# Patient Record
Sex: MALE | Race: WHITE | URBAN - METROPOLITAN AREA
[De-identification: names, ages, dates, MRNs, and addresses within clinical notes are randomized per-mention and may not be internally consistent; named-entity substitution may affect disease eponyms.]

---

## 2017-02-07 RX ORDER — EMPAGLIFLOZIN 10 MG/1
TABLET, FILM COATED ORAL
Qty: 30 TAB | Refills: 0 | Status: SHIPPED | OUTPATIENT
Start: 2017-02-07 | End: 2017-02-22 | Stop reason: SDUPTHER

## 2017-02-07 RX ORDER — INSULIN GLARGINE 100 [IU]/ML
INJECTION, SOLUTION SUBCUTANEOUS
Qty: 15 PEN | Refills: 0 | Status: SHIPPED | OUTPATIENT
Start: 2017-02-07 | End: 2017-02-22 | Stop reason: SDUPTHER

## 2017-02-22 ENCOUNTER — OFFICE VISIT (OUTPATIENT)
Dept: FAMILY MEDICINE CLINIC | Age: 52
End: 2017-02-22

## 2017-02-22 VITALS
HEART RATE: 83 BPM | OXYGEN SATURATION: 97 % | SYSTOLIC BLOOD PRESSURE: 136 MMHG | BODY MASS INDEX: 39.31 KG/M2 | WEIGHT: 259.4 LBS | TEMPERATURE: 98.4 F | DIASTOLIC BLOOD PRESSURE: 88 MMHG | RESPIRATION RATE: 16 BRPM | HEIGHT: 68 IN

## 2017-02-22 DIAGNOSIS — I10 ESSENTIAL HYPERTENSION, BENIGN: ICD-10-CM

## 2017-02-22 DIAGNOSIS — E78.2 MIXED HYPERLIPIDEMIA: ICD-10-CM

## 2017-02-22 LAB — HBA1C MFR BLD HPLC: 8 %

## 2017-02-22 RX ORDER — ATORVASTATIN CALCIUM 40 MG/1
TABLET, FILM COATED ORAL
Qty: 30 TAB | Refills: 5 | Status: SHIPPED | OUTPATIENT
Start: 2017-02-22 | End: 2017-07-17 | Stop reason: SDUPTHER

## 2017-02-22 RX ORDER — INSULIN GLARGINE 100 [IU]/ML
INJECTION, SOLUTION SUBCUTANEOUS
Qty: 15 PEN | Refills: 0 | Status: SHIPPED | OUTPATIENT
Start: 2017-02-22 | End: 2017-04-03 | Stop reason: SDUPTHER

## 2017-02-22 RX ORDER — LISINOPRIL 20 MG/1
TABLET ORAL
Qty: 30 TAB | Refills: 5 | Status: SHIPPED | OUTPATIENT
Start: 2017-02-22 | End: 2017-07-17 | Stop reason: SDUPTHER

## 2017-02-22 RX ORDER — PIOGLITAZONEHYDROCHLORIDE 30 MG/1
TABLET ORAL
Qty: 30 TAB | Refills: 5 | Status: SHIPPED | OUTPATIENT
Start: 2017-02-22 | End: 2017-07-17 | Stop reason: SDUPTHER

## 2017-02-22 RX ORDER — METFORMIN HYDROCHLORIDE 1000 MG/1
TABLET ORAL
Qty: 60 TAB | Refills: 5 | Status: SHIPPED | OUTPATIENT
Start: 2017-02-22 | End: 2017-07-17 | Stop reason: SDUPTHER

## 2017-02-22 NOTE — LETTER
3/14/2017 12:00 PM 
 
[id number] Dear Vi Nolan: 
 
Please find your most recent results below. Resulted Orders AMB POC HEMOGLOBIN A1C Result Value Ref Range Hemoglobin A1c (POC) 8 % METABOLIC PANEL, COMPREHENSIVE Result Value Ref Range Glucose 109 (H) 65 - 99 mg/dL BUN 15 6 - 24 mg/dL Creatinine 0.88 0.76 - 1.27 mg/dL GFR est non-AA 98 >59 mL/min/1.73 GFR est  >59 mL/min/1.73  
 BUN/Creatinine ratio 17 9 - 20 Sodium 140 134 - 144 mmol/L Potassium 4.2 3.5 - 5.2 mmol/L Chloride 99 96 - 106 mmol/L  
 CO2 23 18 - 29 mmol/L Calcium 8.8 8.7 - 10.2 mg/dL Protein, total 6.4 6.0 - 8.5 g/dL Albumin 4.4 3.5 - 5.5 g/dL GLOBULIN, TOTAL 2.0 1.5 - 4.5 g/dL A-G Ratio 2.2 1.1 - 2.5 Comment: **Effective March 13, 2017 the reference interval** 
  for A/G Ratio will be changing to: Age                Male          Female 0 -  7 days       1.1 - 2.3       1.1 - 2.3 
          8 - 30 days       1.2 - 2.8       1.2 - 2.8 
          1 -  6 months     1.3 - 3.6       1.3 - 3.6 
   7 months -  5 years      1.5 - 2.6       1.5 - 2.6 
             > 5 years      1.2 - 2.2       1.2 - 2.2 Bilirubin, total 0.7 0.0 - 1.2 mg/dL Alk. phosphatase 61 39 - 117 IU/L  
 AST (SGOT) 19 0 - 40 IU/L  
 ALT (SGPT) 16 0 - 44 IU/L Narrative Performed at:  [address]  [id number] : Jenelle Flroence MD, Phone:  [phone] LIPID PANEL Result Value Ref Range Cholesterol, total 127 100 - 199 mg/dL Triglyceride 77 0 - 149 mg/dL HDL Cholesterol 42 >39 mg/dL VLDL, calculated 15 5 - 40 mg/dL LDL, calculated 70 0 - 99 mg/dL Narrative Performed at:  [address]  [id number] : Jenelle Florence MD, Phone:  [phone] MICROALBUMIN, UR, RAND Result Value Ref Range Creatinine, urine 94.2 Not Estab. mg/dL Microalbumin, urine <3.0 Not Estab. ug/mL Microalb/Creat ratio (ug/mg creat.) <3.2 0.0 - 30.0 mg/g creat Narrative Performed at:  [address]  [id number] : Yaneli Gunn MD, Phone:  [phone] CVD REPORT Result Value Ref Range INTERPRETATION Note Comment:  
   Supplement report is available. Narrative Performed at:  [address] [place] [address] [address] [place] [address]  [id number] : Benoit Antony PhD, Phone:  [phone] Cholesterol is at goal  
Hemoglobin A1C needs to be less than 8% Needs to work on diet and exercise Needs to be seen in 3 months Rest of his labs are stable Please call me if you have any questions: [phone] Sincerely, Todd Gutierrez MD

## 2017-02-23 NOTE — PROGRESS NOTES
Chief Complaint   Patient presents with    Diabetes       1. Have you been to the ER, urgent care clinic since your last visit? Hospitalized since your last visit? No    2. Have you seen or consulted any other health care providers outside of the [address] [place] [address] since your last visit? Include any pap smears or colon screening. No    Body mass index is 39.44 kg/(m^2).

## 2017-02-23 NOTE — PROGRESS NOTES
HISTORY OF PRESENT ILLNESS  Veronique New is a 46 y.o. male. Blood pressure 136/88, pulse 83, temperature 98.4 °F (36.9 °C), temperature source Oral, resp. rate 16, height 5' 8\" (1.727 m), weight 259 lb 6.4 oz (117.7 kg), SpO2 97 %. Body mass index is 39.44 kg/(m^2). Chief Complaint   Patient presents with    Diabetes      HPI   Veronique New 46 y.o. male  presents to the office today for a follow up on chronic conditions. DM2: A1c per POC today 8.0%, increased from A1c of 7.3% on 08/30/16. Pt continues with Lantus Solostar 60 units daily, Jardiance 10 mg daily, Actos 30 mg daily, and Metformin 1,000 mg BID. Pt attributes increased A1c to poor diet choices and increased stress at his new job. He notes his insurance no longer covers Invokana so he has been off the medication. He notes completing retinal eye exams yearly with ophthalmology (Dr. Lizett Covington). Diabetes is uncontrolled with A1c 8.0% per POC today. Since pt can no longer take Invokana due to financial reasons, we will increase dosage of Jardiance to 25 mg daily. Counseled pt on diet and exercise. Will reassess A1c in three months. Hypertension: Bp at office today 137/90. Pt continues with Lisinopril 20 mg daily. Bp control stable, continue current regimen. Hyperlipidemia: Lipid panel on 04/13/16 notable for total cholesterol 135, LDL 70, HDL 48, and triglycerides 86. Pt continues with Lipitor 40 mg daily. Cholesterol is presumed stable, will assess levels when pt returns for fasting labs. Health maintenance: Pt notes completing colonoscopy in 2016 with GI (Dr. Argentina Olson). We will contact Dr. João Martino office to receive pt's report. Pt will receive flu vaccine at office today. Current Outpatient Prescriptions   Medication Sig Dispense Refill    empagliflozin (JARDIANCE) 25 mg tablet TAKE 1 TABLET BY MOUTH DAILY.  30 Tab 5    insulin glargine (LANTUS SOLOSTAR) 100 unit/mL (3 mL) pen INJECT 60 UNITS DAILY 15 Pen 0    pioglitazone (ACTOS) 30 mg tablet TAKE ONE TABLET BY MOUTH DAILY 30 Tab 5    atorvastatin (LIPITOR) 40 mg tablet TAKE 1 TABLET BY MOUTH EVERY DAY 30 Tab 5    lisinopril (PRINIVIL, ZESTRIL) 20 mg tablet TAKE 1 TABLET BY MOUTH EVERY DAY 30 Tab 5    metFORMIN (GLUCOPHAGE) 1,000 mg tablet TAKE 1 TABLET BY MOUTH TWICE A DAY WITH MEALS 60 Tab 5    pantoprazole (PROTONIX) 40 mg tablet TAKE 1 TABLET BY MOUTH EVERY DAY 30 Tab 5    pen needle, diabetic (NOVOTWIST) 32 gauge x 1/5\" ndle 100 Each by Does Not Apply route daily. 200 Each 11    glucose blood VI test strips (BLOOD GLUCOSE TEST) strip Accu-Chek Comfort Curv,    Dx code 250.02    Test blood sugar twice daily or as directed by Dr. Andriy Laurent. 200 Package 11    Lancets misc Accu-Chek Comfort Curv monitor   Test blood sugar twice a day or as directed by Dr. Andriy Laurent. 200 Each 11    Blood-Glucose Meter monitoring kit Check glucose daily in the Am  Dx 250.02 1 kit 0     Allergies   Allergen Reactions    Ppa/Pe/Phenyltolox/Cpm Td Unknown (comments)     Allergies:PPA     Past Medical History:   Diagnosis Date    Allergic rhinitis due to other allergen 3/5/2010    Colon polyp     Family history of colon cancer in mother     Hypertension     Mixed hyperlipidemia 3/5/2010    Obesity, unspecified 3/5/2010    PUD (peptic ulcer disease)     15 years ago   [address] [place] [address] Pure hyperglyceridemia 3/5/2010    Type II or unspecified type diabetes mellitus without mention of complication, not stated as uncontrolled 3/5/2010     Past Surgical History:   Procedure Laterality Date    HX COLONOSCOPY  03/2016    Dr. Marie Montana     Family History   Problem Relation Age of Onset    Cancer Mother     Heart Disease Father      Social History   Substance Use Topics    Smoking status: Never Smoker    Smokeless tobacco: Never Used    Alcohol use 2.0 oz/week     4 Standard drinks or equivalent per week        Review of Systems   Constitutional: Negative. Negative for malaise/fatigue.    Eyes: Negative for blurred vision. Respiratory: Negative for shortness of breath. Cardiovascular: Negative for chest pain and leg swelling. Musculoskeletal: Negative. Neurological: Negative. Negative for dizziness and headaches. All other systems reviewed and are negative. Physical Exam   Constitutional: He is oriented to person, place, and time. He appears well-developed and well-nourished. HENT:   Head: Normocephalic and atraumatic. Neck: Carotid bruit is not present. Cardiovascular: Normal rate, regular rhythm, normal heart sounds and intact distal pulses. Exam reveals no gallop and no friction rub. No murmur heard. Pulmonary/Chest: Effort normal and breath sounds normal. No respiratory distress. He has no wheezes. He has no rales. He exhibits no tenderness. Neurological: He is alert and oriented to person, place, and time. Psychiatric: He has a normal mood and affect. His behavior is normal. Judgment and thought content normal.   Nursing note and vitals reviewed. Diabetic foot exam:     Left: Reflexes 2+     Vibratory sensation normal    Proprioception normal   Sharp/dull discrimination normal    Filament test normal sensation with micro filament   Pulse DP: 2+ (normal)   Pulse PT: 2+ (normal)   Deformities: None  Right: Reflexes 2+   Vibratory sensation normal   Proprioception normal   Sharp/dull discrimination normal   Filament test normal sensation with micro filament   Pulse DP: 2+ (normal)   Pulse PT: 2+ (normal)   Deformities: None    JOSELO and Tyson Pace was seen today for diabetes. Diagnoses and all orders for this visit:    Uncontrolled type 2 diabetes mellitus without complication, with long-term current use of insulin ([facility])  -     AMB POC HEMOGLOBIN A1C  -     empagliflozin (JARDIANCE) 25 mg tablet; TAKE 1 TABLET BY MOUTH DAILY.   -     insulin glargine (LANTUS SOLOSTAR) 100 unit/mL (3 mL) pen; INJECT 60 UNITS DAILY  -     pioglitazone (ACTOS) 30 mg tablet; TAKE ONE TABLET BY MOUTH DAILY  -     metFORMIN (GLUCOPHAGE) 1,000 mg tablet; TAKE 1 TABLET BY MOUTH TWICE A DAY WITH MEALS  -      DIABETES FOOT EXAM  -     METABOLIC PANEL, COMPREHENSIVE  -     MICROALBUMIN, UR, RAND W/ MICROALBUMIN/CREA RATIO  - Diabetes is uncontrolled with A1c 8.0% per POC today. Pt has been off his Invokana 100 mg due to financial reasons. Advised pt to increase dosage of Jardiance to 25 mg daily and counseled him on importance of diabetic diet. We will reassess A1c in three months. Mixed hyperlipidemia  -     atorvastatin (LIPITOR) 40 mg tablet; TAKE 1 TABLET BY MOUTH EVERY DAY  -     lisinopril (PRINIVIL, ZESTRIL) 20 mg tablet; TAKE 1 TABLET BY MOUTH EVERY DAY  -     METABOLIC PANEL, COMPREHENSIVE  -     LIPID PANEL  - Presumed stable, will assess levels when pt returns for fasting labs. Essential hypertension, benign  -     METABOLIC PANEL, COMPREHENSIVE  - Bp control stable, continue current regimen. Obesity, Class II, BMI 35-39.9, with comorbidity ([address]  I have reviewed/discussed the above normal BMI with the patient. I have recommended the following interventions: dietary management education, guidance, and counseling, dietary needs education, encourage exercise, feeding regime, lifestyle education regarding diet and monitor weight . The plan is as follows: I have counseled this patient on diet and exercise regimens. .        Follow-up Disposition:  Return in about 3 months (around 5/22/2017) for diabetes follow up. Medication risks/benefits/costs/interactions/alternatives discussed with patient. Advised patient to call back or return to office if symptoms worsen/change/persist.  If patient cannot reach us or should anything more severe/urgent arise he/she should proceed directly to the nearest emergency department. Discussed expected course/resolution/complications of diagnosis in detail with patient.   Patient given a written after visit summary which includes her diagnoses, current medications and vitals. Patient expressed understanding with the diagnosis and plan. Written by hoa Watson, as dictated by Mindi Sever, M.D.    I have reviewed and agree with the above note and have made corrections where appropriate, Dr. Prasad Guevara MD

## 2017-02-26 LAB
ALBUMIN SERPL-MCNC: 4.4 G/DL (ref 3.5–5.5)
ALBUMIN/CREAT UR: <3.2 MG/G CREAT (ref 0–30)
ALBUMIN/GLOB SERPL: 2.2 {RATIO} (ref 1.1–2.5)
ALP SERPL-CCNC: 61 IU/L (ref 39–117)
ALT SERPL-CCNC: 16 IU/L (ref 0–44)
AST SERPL-CCNC: 19 IU/L (ref 0–40)
BILIRUB SERPL-MCNC: 0.7 MG/DL (ref 0–1.2)
BUN SERPL-MCNC: 15 MG/DL (ref 6–24)
BUN/CREAT SERPL: 17 (ref 9–20)
CALCIUM SERPL-MCNC: 8.8 MG/DL (ref 8.7–10.2)
CHLORIDE SERPL-SCNC: 99 MMOL/L (ref 96–106)
CHOLEST SERPL-MCNC: 127 MG/DL (ref 100–199)
CO2 SERPL-SCNC: 23 MMOL/L (ref 18–29)
CREAT SERPL-MCNC: 0.88 MG/DL (ref 0.76–1.27)
CREAT UR-MCNC: 94.2 MG/DL
GLOBULIN SER CALC-MCNC: 2 G/DL (ref 1.5–4.5)
GLUCOSE SERPL-MCNC: 109 MG/DL (ref 65–99)
HDLC SERPL-MCNC: 42 MG/DL
INTERPRETATION, 910389: NORMAL
LDLC SERPL CALC-MCNC: 70 MG/DL (ref 0–99)
MICROALBUMIN UR-MCNC: <3 UG/ML
POTASSIUM SERPL-SCNC: 4.2 MMOL/L (ref 3.5–5.2)
PROT SERPL-MCNC: 6.4 G/DL (ref 6–8.5)
SODIUM SERPL-SCNC: 140 MMOL/L (ref 134–144)
TRIGL SERPL-MCNC: 77 MG/DL (ref 0–149)
VLDLC SERPL CALC-MCNC: 15 MG/DL (ref 5–40)

## 2017-03-13 NOTE — PROGRESS NOTES
Cholesterol is at goal  A1C needs to be less than 8%  Pt needs to work on diet and exercise  Pt needs to be seen in 3 months    Rest of his labs are stable

## 2017-03-14 NOTE — PROGRESS NOTES
Cholesterol is at goal   Hemoglobin A1C needs to be less than 8%   Needs to work on diet and exercise   Needs to be seen in 3 months   Mail labs to patient

## 2017-04-01 DIAGNOSIS — K21.9 GASTROESOPHAGEAL REFLUX DISEASE WITHOUT ESOPHAGITIS: ICD-10-CM

## 2017-04-03 RX ORDER — PANTOPRAZOLE SODIUM 40 MG/1
TABLET, DELAYED RELEASE ORAL
Qty: 30 TAB | Refills: 5 | Status: SHIPPED | OUTPATIENT
Start: 2017-04-03 | End: 2017-07-17 | Stop reason: SDUPTHER

## 2017-04-06 RX ORDER — INSULIN GLARGINE 100 [IU]/ML
INJECTION, SOLUTION SUBCUTANEOUS
Qty: 15 PEN | Refills: 5 | Status: SHIPPED | OUTPATIENT
Start: 2017-04-06 | End: 2017-07-17 | Stop reason: SDUPTHER

## 2017-07-17 ENCOUNTER — OFFICE VISIT (OUTPATIENT)
Dept: FAMILY MEDICINE CLINIC | Age: 52
End: 2017-07-17

## 2017-07-17 VITALS
HEIGHT: 68 IN | OXYGEN SATURATION: 98 % | DIASTOLIC BLOOD PRESSURE: 83 MMHG | BODY MASS INDEX: 39.4 KG/M2 | TEMPERATURE: 98 F | HEART RATE: 78 BPM | SYSTOLIC BLOOD PRESSURE: 120 MMHG | WEIGHT: 260 LBS | RESPIRATION RATE: 18 BRPM

## 2017-07-17 DIAGNOSIS — E78.2 MIXED HYPERLIPIDEMIA: ICD-10-CM

## 2017-07-17 DIAGNOSIS — Z79.4 UNCONTROLLED TYPE 2 DIABETES MELLITUS WITH HYPERGLYCEMIA, WITH LONG-TERM CURRENT USE OF INSULIN (HCC): Primary | ICD-10-CM

## 2017-07-17 DIAGNOSIS — K21.9 GASTROESOPHAGEAL REFLUX DISEASE WITHOUT ESOPHAGITIS: ICD-10-CM

## 2017-07-17 DIAGNOSIS — E11.65 UNCONTROLLED TYPE 2 DIABETES MELLITUS WITH HYPERGLYCEMIA, WITH LONG-TERM CURRENT USE OF INSULIN (HCC): Primary | ICD-10-CM

## 2017-07-17 DIAGNOSIS — I10 ESSENTIAL HYPERTENSION, BENIGN: ICD-10-CM

## 2017-07-17 LAB — HBA1C MFR BLD HPLC: 8 %

## 2017-07-17 RX ORDER — PANTOPRAZOLE SODIUM 40 MG/1
TABLET, DELAYED RELEASE ORAL
Qty: 30 TAB | Refills: 5 | Status: SHIPPED | OUTPATIENT
Start: 2017-07-17 | End: 2017-12-04 | Stop reason: SDUPTHER

## 2017-07-17 RX ORDER — LISINOPRIL 20 MG/1
TABLET ORAL
Qty: 30 TAB | Refills: 5 | Status: SHIPPED | OUTPATIENT
Start: 2017-07-17 | End: 2017-12-04 | Stop reason: SDUPTHER

## 2017-07-17 RX ORDER — PIOGLITAZONEHYDROCHLORIDE 30 MG/1
TABLET ORAL
Qty: 30 TAB | Refills: 5 | Status: SHIPPED | OUTPATIENT
Start: 2017-07-17 | End: 2017-12-04 | Stop reason: SDUPTHER

## 2017-07-17 RX ORDER — INSULIN GLARGINE 100 [IU]/ML
INJECTION, SOLUTION SUBCUTANEOUS
Qty: 15 PEN | Refills: 5 | Status: SHIPPED | OUTPATIENT
Start: 2017-07-17 | End: 2017-12-04 | Stop reason: SDUPTHER

## 2017-07-17 RX ORDER — ATORVASTATIN CALCIUM 40 MG/1
TABLET, FILM COATED ORAL
Qty: 30 TAB | Refills: 5 | Status: SHIPPED | OUTPATIENT
Start: 2017-07-17 | End: 2017-12-04 | Stop reason: SDUPTHER

## 2017-07-17 RX ORDER — METFORMIN HYDROCHLORIDE 1000 MG/1
TABLET ORAL
Qty: 60 TAB | Refills: 5 | Status: SHIPPED | OUTPATIENT
Start: 2017-07-17 | End: 2017-12-04 | Stop reason: SDUPTHER

## 2017-07-17 NOTE — PROGRESS NOTES
HISTORY OF PRESENT ILLNESS  Wilmer Barros is a 46 y.o. male. Blood pressure 120/83, pulse 78, temperature 98 °F (36.7 °C), temperature source Oral, resp. rate 18, height 5' 8\" (1.727 m), weight 260 lb (117.9 kg), SpO2 98 %. Body mass index is 39.53 kg/(m^2). Chief Complaint   Patient presents with    Diabetes        HPI  Wilmer Barros 46 y.o. male  presents to the office today for follow up on diabetes. DM2: A1c per POC today 8.0%, same as A1c of 8.0% on 02/22/27. Pt continues with Lantus Solostar 60 units daily, Jardiance 25 mg daily, Metformin 1,000 mg BID. Pt states that his diet has been suboptimal, states he has been drinking margaritas and eating gummy bears. Pt states that he feels that he has been relying on drugs alone to lower A1c. Diabetes is suboptimal with A1c of 8.0% at [facility] today. Advised pt to improve A1c through diet and exercise. We will follow up in 4 months. Health Maintenance: Pt states he had colonoscopy done by Dr. Moody Steele (GI) and eye exam done by Dr. Jamie Martin (Opthamology). GERD: Pt stats that he has problem with GERD. He states that the GERD is worse with eating food such as pizza. Pt is currently on Protonix 40 daily and OTC Zantac 100 mg daily. I have recommended that pt start OTC [id number] mg daily and Protonix 40 mg daily. If symptoms for GERD do not get better, we may refer pt to GI for further evaluation. Current Outpatient Prescriptions   Medication Sig Dispense Refill    insulin glargine (LANTUS SOLOSTAR) 100 unit/mL (3 mL) inpn INECT 60 UNIT UNDER THE SKIN DAILY 15 Pen 5    pantoprazole (PROTONIX) 40 mg tablet TAKE ONE TABLET BY MOUTH DAILY 30 Tab 5    empagliflozin (JARDIANCE) 25 mg tablet TAKE 1 TABLET BY MOUTH DAILY.  30 Tab 5    pioglitazone (ACTOS) 30 mg tablet TAKE ONE TABLET BY MOUTH DAILY 30 Tab 5    lisinopril (PRINIVIL, ZESTRIL) 20 mg tablet TAKE 1 TABLET BY MOUTH EVERY DAY 30 Tab 5    pen needle, diabetic (NOVOTWIST) 32 gauge x 1/5\" ndle 100 Each by Does Not Apply route daily. 200 Pen Needle 11    metFORMIN (GLUCOPHAGE) 1,000 mg tablet TAKE 1 TABLET BY MOUTH TWICE A DAY WITH MEALS 60 Tab 5    atorvastatin (LIPITOR) 40 mg tablet TAKE 1 TABLET BY MOUTH EVERY DAY 30 Tab 5    glucose blood VI test strips (BLOOD GLUCOSE TEST) strip Accu-Chek Comfort Curv,    Dx code 250.02    Test blood sugar twice daily or as directed by Dr. Delmi Montes. 200 Strip 11    Lancets misc Accu-Chek Comfort Curv monitor   Test blood sugar twice a day or as directed by Dr. Delmi Montes. 200 Each 11    Blood-Glucose Meter monitoring kit Check glucose daily in the Am  Dx 250.02 1 kit 0     Allergies   Allergen Reactions    Ppa/Pe/Phenyltolox/Cpm Td Unknown (comments)     Allergies:PPA     Past Medical History:   Diagnosis Date    Allergic rhinitis due to other allergen 3/5/2010    Colon polyp     Family history of colon cancer in mother     Hypertension     Mixed hyperlipidemia 3/5/2010    Obesity, unspecified 3/5/2010    PUD (peptic ulcer disease)     15 years ago   [address] [place] [address] Pure hyperglyceridemia 3/5/2010    Type II or unspecified type diabetes mellitus without mention of complication, not stated as uncontrolled 3/5/2010     Past Surgical History:   Procedure Laterality Date    HX COLONOSCOPY  03/2016    Dr. Heather Montoya     Family History   Problem Relation Age of Onset    Cancer Mother     Heart Disease Father      Social History   Substance Use Topics    Smoking status: Never Smoker    Smokeless tobacco: Never Used    Alcohol use 2.0 oz/week     4 Standard drinks or equivalent per week        Review of Systems   Constitutional: Negative. Negative for malaise/fatigue. Eyes: Negative for blurred vision. Respiratory: Negative for shortness of breath. Cardiovascular: Negative for chest pain and leg swelling. Gastrointestinal: Positive for heartburn. Musculoskeletal: Negative. Neurological: Negative. Negative for dizziness and headaches.    All other systems reviewed and are negative. Physical Exam   Constitutional: He is oriented to person, place, and time. He appears well-developed and well-nourished. HENT:   Head: Normocephalic and atraumatic. Neck: Carotid bruit is not present. Cardiovascular: Normal rate, regular rhythm, normal heart sounds and intact distal pulses. Exam reveals no gallop and no friction rub. No murmur heard. Pulmonary/Chest: Effort normal and breath sounds normal. No respiratory distress. He has no wheezes. He has no rales. He exhibits no tenderness. Neurological: He is alert and oriented to person, place, and time. Psychiatric: He has a normal mood and affect. His behavior is normal. Judgment and thought content normal.   Nursing note and vitals reviewed. Diabetic foot exam:     Left: Reflexes 2+     Vibratory sensation normal    Proprioception normal   Sharp/dull discrimination normal    Filament test normal sensation with micro filament   Pulse DP: 2+ (normal)   Pulse PT: 2+ (normal)   Deformities: None  Right: Reflexes 2+   Vibratory sensation normal   Proprioception normal   Sharp/dull discrimination normal   Filament test normal sensation with micro filament   Pulse DP: 2+ (normal)   Pulse PT: 2+ (normal)   Deformities: None      ASSESSMENT and Daryl Moreno was seen today for diabetes. Diagnoses and all orders for this visit:    Uncontrolled type 2 diabetes mellitus with hyperglycemia, with long-term current use of insulin ([facility])  -     [facility] POC HEMOGLOBIN A1C  -      DIABETES FOOT EXAM  -     insulin glargine (LANTUS SOLOSTAR) 100 unit/mL (3 mL) inpn; INECT 60 UNIT UNDER THE SKIN DAILY  -     empagliflozin (JARDIANCE) 25 mg tablet; TAKE 1 TABLET BY MOUTH DAILY. -     pioglitazone (ACTOS) 30 mg tablet; TAKE ONE TABLET BY MOUTH DAILY  -     pen needle, diabetic (NOVOTWIST) 32 gauge x 1/5\" ndle; 100 Each by Does Not Apply route daily.   -     metFORMIN (GLUCOPHAGE) 1,000 mg tablet; TAKE 1 TABLET BY MOUTH TWICE A DAY WITH MEALS  -     glucose blood VI test strips (BLOOD GLUCOSE TEST) strip; Accu-Chek Comfort Curv,    Dx code 250.02    Test blood sugar twice daily or as directed by Dr. Jacob Burton.  - Diabetes is sub-optimal with A1c of 8.4% at [address] today. I have recommend that pt work on diet and exercise regimen to lower levels. We will follow up in 4 months. Essential hypertension, benign        - Bp control stable, continue current regimen. Mixed hyperlipidemia  -     lisinopril (PRINIVIL, ZESTRIL) 20 mg tablet; TAKE 1 TABLET BY MOUTH EVERY DAY  -     atorvastatin (LIPITOR) 40 mg tablet; TAKE 1 TABLET BY MOUTH EVERY DAY  - Lipids are at goal. Continue current regimen. Gastroesophageal reflux disease without esophagitis  -     pantoprazole (PROTONIX) 40 mg tablet; TAKE ONE TABLET BY MOUTH DAILY  - Pt hasa GERD. I have adivsed pt to take OTC Zantac 150 mg daily and Protonix 40 mg daily. If symptoms get worse, we may refer pt to GI. Follow-up Disposition:  Return in about 4 months (around 11/1/2017) for diabetes follow up. Medication risks/benefits/costs/interactions/alternatives discussed with patient. Advised patient to call back or return to office if symptoms worsen/change/persist.  If patient cannot reach us or should anything more severe/urgent arise he/she should proceed directly to the nearest emergency department. Discussed expected course/resolution/complications of diagnosis in detail with patient. Patient given a written after visit summary which includes her diagnoses, current medications and vitals. Patient expressed understanding with the diagnosis and plan. Written by hoa Keene, as dictated by Kip Wisdom M.D.   I have reviewed and agree with the above note and have made corrections where appropriate, Dr. Tod Epley, MD

## 2017-07-17 NOTE — MR AVS SNAPSHOT
Visit Information Date & Time Provider Department Dept. Phone Encounter #  
 7/17/2017  4:15 PM Kylah Castro  W [facility] [phone] [id number] Follow-up Instructions Return in about 4 months (around 11/1/2017) for diabetes follow up. Your Appointments 11/1/2017  2:30 PM  
ROUTINE CARE with Kylah Castro MD  
[facility]) Appt Note: 3 months follow up visit DM  
 [address] [phone]  
[phone]  
  
   
 [address] [phone] Upcoming Health Maintenance Date Due  
 EYE EXAM RETINAL OR DILATED Q1 9/4/1973 COLONOSCOPY 12/7/2015 INFLUENZA AGE 9 TO ADULT 8/1/2017 HEMOGLOBIN A1C Q6M 8/22/2017 FOOT EXAM Q1 2/22/2018 MICROALBUMIN Q1 2/25/2018 LIPID PANEL Q1 2/25/2018 DTaP/Tdap/Td series (2 - Td) 12/30/2024 Allergies as of 7/17/2017  Review Complete On: 7/17/2017 By: Kylah Castro MD  
  
 Severity Noted Reaction Type Reactions Ppa/pe/phenyltolox/cpm Td  03/05/2010    Unknown (comments) Allergies:PPA Current Immunizations  Reviewed on 12/30/2014 Name Date Influenza Vaccine (Quad) PF 12/30/2014  4:36 PM  
 Influenza Vaccine Split 2/10/2011 Pneumococcal Polysaccharide (PPSV-23) 12/5/2007 Tdap 12/30/2014  4:34 PM  
  
 Not reviewed this visit You Were Diagnosed With   
  
 Codes Comments Uncontrolled type 2 diabetes mellitus with hyperglycemia, with long-term current use of insulin (HCC)    -  Primary ICD-10-CM: E11.65, Z79.4 ICD-9-CM: 250.02, V58.67 Essential hypertension, benign     ICD-10-CM: I10 
ICD-9-CM: 401.1 Mixed hyperlipidemia     ICD-10-CM: E78.2 ICD-9-CM: 272.2 Gastroesophageal reflux disease without esophagitis     ICD-10-CM: K21.9 ICD-9-CM: 530.81 Vitals BP Pulse Temp Resp Height(growth percentile) Weight(growth percentile) 120/83 (BP 1 Location: Right arm, BP Patient Position: Sitting) 78 98 °F (36.7 °C) (Oral) 18 5' 8\" (1.727 m) 260 lb (117.9 kg) SpO2 BMI Smoking Status 98% 39.53 kg/m2 Never Smoker Vitals History BMI and BSA Data Body Mass Index Body Surface Area  
 39.53 kg/m 2 2.38 m 2 Preferred Pharmacy Pharmacy Name Phone [facility] [address] [address], [address] [phone] Your Updated Medication List  
  
   
This list is accurate as of: 7/17/17  5:05 PM.  Always use your most recent med list.  
  
  
  
  
 atorvastatin 40 mg tablet Commonly known as:  LIPITOR  
TAKE 1 TABLET BY MOUTH EVERY DAY Blood-Glucose Meter monitoring kit Check glucose daily in the Am  Dx 250.02  
  
 empagliflozin 25 mg tablet Commonly known as:  JARDIANCE  
TAKE 1 TABLET BY MOUTH DAILY. glucose blood VI test strips strip Commonly known as:  blood glucose test  
Accu-Chek Comfort Curv,    Dx code 250.02    Test blood sugar twice daily or as directed by Dr. Weston Ward. Lancets Misc Accu-Chek Comfort Curv monitor   Test blood sugar twice a day or as directed by Dr. Weston Ward. LANTUS SOLOSTAR 100 unit/mL (3 mL) Inpn Generic drug:  insulin glargine INECT 60 UNIT UNDER THE SKIN DAILY  
  
 lisinopril 20 mg tablet Commonly known as:  PRINIVIL, ZESTRIL  
TAKE 1 TABLET BY MOUTH EVERY DAY  
  
 metFORMIN 1,000 mg tablet Commonly known as:  GLUCOPHAGE  
TAKE 1 TABLET BY MOUTH TWICE A DAY WITH MEALS  
  
 pantoprazole 40 mg tablet Commonly known as:  PROTONIX  
TAKE ONE TABLET BY MOUTH DAILY pen needle, diabetic 32 gauge x 1/5\" Ndle Commonly known as:  NOVOTWIST  
100 Each by Does Not Apply route daily. pioglitazone 30 mg tablet Commonly known as:  ACTOS  
TAKE ONE TABLET BY MOUTH DAILY We Performed the Following AMB POC HEMOGLOBIN A1C [81352 CPT(R)] Follow-up Instructions Return in about 4 months (around 11/1/2017) for diabetes follow up. Introducing [facility]! [employer] introduces [employer] patient portal. Now you can access parts of your medical record, email your doctor's office, and request medication refills online. 1. In your internet browser, go to [email or web address] 2. Click on the First Time User? Click Here link in the Sign In box. You will see the New Member Sign Up page. 3. Enter your [employer] Access Code exactly as it appears below. You will not need to use this code after youve completed the sign-up process. If you do not sign up before the expiration date, you must request a new code. · [employer] Access Code: [email or web address] Expires: 10/15/2017  5:00 PM 
 
4. Enter the last four digits of your Social Security Number (xxxx) and Date of Birth (mm/dd/yyyy) as indicated and click Submit. You will be taken to the next sign-up page. 5. Create a [employer] ID. This will be your [employer] login ID and cannot be changed, so think of one that is secure and easy to remember. 6. Create a [employer] password. You can change your password at any time. 7. Enter your Password Reset Question and Answer. This can be used at a later time if you forget your password. 8. Enter your e-mail address. You will receive e-mail notification when new information is available in [address] 9. Click Sign Up. You can now view and download portions of your medical record. 10. Click the Download Summary menu link to download a portable copy of your medical information. If you have questions, please visit the Frequently Asked Questions section of the [employer] website. Remember, [employer] is NOT to be used for urgent needs. For medical emergencies, dial 911. Now available from your iPhone and Android! Please provide this summary of care documentation to your next provider. Your primary care clinician is listed as Rachana Greene. If you have any questions after today's visit, please call [phone].

## 2017-07-17 NOTE — PROGRESS NOTES
Chief Complaint   Patient presents with    Diabetes       Reviewed Record in preparation for visit and have obtained necessary documentation. Identified pt with two pt identifiers (Name @ )    Health Maintenance Due   Topic    EYE EXAM RETINAL OR DILATED Q1     COLONOSCOPY          1. Have you been to the ER, urgent care clinic since your last visit? Hospitalized since your last visit? No    2. Have you seen or consulted any other health care providers outside of the [facility] since your last visit? Include any pap smears or colon screening.  No

## 2017-12-04 ENCOUNTER — OFFICE VISIT (OUTPATIENT)
Dept: FAMILY MEDICINE CLINIC | Age: 52
End: 2017-12-04

## 2017-12-04 VITALS
HEART RATE: 69 BPM | DIASTOLIC BLOOD PRESSURE: 72 MMHG | OXYGEN SATURATION: 97 % | SYSTOLIC BLOOD PRESSURE: 110 MMHG | HEIGHT: 68 IN | RESPIRATION RATE: 18 BRPM | TEMPERATURE: 97.4 F | BODY MASS INDEX: 38.1 KG/M2 | WEIGHT: 251.4 LBS

## 2017-12-04 DIAGNOSIS — E78.2 MIXED HYPERLIPIDEMIA: ICD-10-CM

## 2017-12-04 DIAGNOSIS — I10 ESSENTIAL HYPERTENSION, BENIGN: ICD-10-CM

## 2017-12-04 DIAGNOSIS — Z79.4 UNCONTROLLED TYPE 2 DIABETES MELLITUS WITH HYPERGLYCEMIA, WITH LONG-TERM CURRENT USE OF INSULIN (HCC): Primary | ICD-10-CM

## 2017-12-04 DIAGNOSIS — K21.9 GASTROESOPHAGEAL REFLUX DISEASE WITHOUT ESOPHAGITIS: ICD-10-CM

## 2017-12-04 DIAGNOSIS — E11.65 UNCONTROLLED TYPE 2 DIABETES MELLITUS WITH HYPERGLYCEMIA, WITH LONG-TERM CURRENT USE OF INSULIN (HCC): Primary | ICD-10-CM

## 2017-12-04 LAB — HBA1C MFR BLD HPLC: 7.9 %

## 2017-12-04 RX ORDER — PANTOPRAZOLE SODIUM 40 MG/1
TABLET, DELAYED RELEASE ORAL
Qty: 30 TAB | Refills: 5 | Status: SHIPPED | OUTPATIENT
Start: 2017-12-04 | End: 2018-12-07 | Stop reason: SDUPTHER

## 2017-12-04 RX ORDER — LISINOPRIL 20 MG/1
TABLET ORAL
Qty: 30 TAB | Refills: 5 | Status: SHIPPED | OUTPATIENT
Start: 2017-12-04 | End: 2018-10-14 | Stop reason: SDUPTHER

## 2017-12-04 RX ORDER — ATORVASTATIN CALCIUM 40 MG/1
TABLET, FILM COATED ORAL
Qty: 30 TAB | Refills: 5 | Status: SHIPPED | OUTPATIENT
Start: 2017-12-04 | End: 2018-10-14 | Stop reason: SDUPTHER

## 2017-12-04 RX ORDER — METFORMIN HYDROCHLORIDE 1000 MG/1
TABLET ORAL
Qty: 60 TAB | Refills: 5 | Status: SHIPPED | OUTPATIENT
Start: 2017-12-04 | End: 2018-10-07 | Stop reason: SDUPTHER

## 2017-12-04 RX ORDER — PIOGLITAZONEHYDROCHLORIDE 30 MG/1
TABLET ORAL
Qty: 30 TAB | Refills: 5 | Status: SHIPPED | OUTPATIENT
Start: 2017-12-04 | End: 2018-09-08 | Stop reason: SDUPTHER

## 2017-12-04 RX ORDER — INSULIN GLARGINE 100 [IU]/ML
INJECTION, SOLUTION SUBCUTANEOUS
Qty: 15 PEN | Refills: 5 | Status: SHIPPED | OUTPATIENT
Start: 2017-12-04 | End: 2018-09-07 | Stop reason: SDUPTHER

## 2017-12-04 NOTE — PATIENT INSTRUCTIONS
Basal Insulin (Levemir/Lantus) Titration Schedule    Starting Dose:  ______60_______ units    Your goal with basal insulin titration is to obtain a blood glucose between 70 - 140. Raise your basal insulin dose by 3 units if your fasting blood glucose is greater than 140 for 2 days in a row. Each morning when you wake up, check your blood glucose with your home glucose monitor before you have anything to eat or drink and this gives you your fasting blood glucose result. your dosage adjustment for your Levemir/Lantus insulin is only based on the fasting blood glucose. Thus if you are at a 10 unit dose of Levemir/Lantus and your fasting blood glucose is 155 and the next day it is 160 you should raise your Levemir/Lantus dose to 13 units. Keep raising your Levemir/Lantus dose daily until you achieve a consistent fasting blood glucose < 140.  just remember only to increase it if the fasting glucose is >140 for two days in a row. So if it were 160 on day 1 and 135 on day 2 you would not raise it because it wasnt two days in a row! When your blood glucose gets between 70 - 140, stay at the same basal insulin dose that got you to this point. In other words, if you end up raising your daily basal insulin dose to 20 units to get to a fasting blood glucose between 70 - 140, stay at that 20 units dose as long as it allows you to maintain that 70 - 140 fasting blood glucose level. If your fasting blood glucose goes below 70, lower your basal insulin dosage by 2 units. In other words, if you wake up one morning and find your fasting blood glucose is 65, lower the basal insulin dose by 2 units (i.e. if you were taking 20 units of Levemir/Lantus the day before, now you reduce your dose to 18 units). Another examples of how to titrate your Levemir/Lantus dose: You take your first 10 unit dose of Levemir/Lantus and your fasting blood glucose the next day is 154. You dont increase yet.   You check it again tomorrow. If it is greater than 140 then you should now raise your Levemir/Lantus dose to 13 units, which is 3 units more than the previous day. You keep checking you morning blood sugar everyday but dont make any additional increases unless it greater than 140 for 2 days in a row.

## 2017-12-04 NOTE — MR AVS SNAPSHOT
Visit Information Date & Time Provider Department Dept. Phone Encounter #  
 12/4/2017  4:15 PM Jamel Montilla  [facility] [phone] [id number] Follow-up Instructions Return in about 4 months (around 4/4/2018) for diabetes follow up. Upcoming Health Maintenance Date Due COLONOSCOPY 12/7/2015 EYE EXAM RETINAL OR DILATED Q1 9/13/2017 HEMOGLOBIN A1C Q6M 1/17/2018 MICROALBUMIN Q1 2/25/2018 LIPID PANEL Q1 2/25/2018 FOOT EXAM Q1 7/17/2018 DTaP/Tdap/Td series (2 - Td) 12/30/2024 Allergies as of 12/4/2017  Review Complete On: 12/4/2017 By: Jamel Montilla MD  
  
 Severity Noted Reaction Type Reactions Ppa/pe/phenyltolox/cpm Td  03/05/2010    Unknown (comments) Allergies:PPA Current Immunizations  Reviewed on 12/1/2017 Name Date Influenza Vaccine 10/15/2017 Influenza Vaccine (Quad) PF 12/30/2014  4:36 PM  
 Influenza Vaccine Split 2/10/2011 Pneumococcal Polysaccharide (PPSV-23) 12/5/2007 Tdap 12/30/2014  4:34 PM  
  
 Not reviewed this visit You Were Diagnosed With   
  
 Codes Comments Uncontrolled type 2 diabetes mellitus with hyperglycemia, with long-term current use of insulin (HCC)    -  Primary ICD-10-CM: E11.65, Z79.4 ICD-9-CM: 250.02, V58.67 Gastroesophageal reflux disease without esophagitis     ICD-10-CM: K21.9 ICD-9-CM: 530.81 Mixed hyperlipidemia     ICD-10-CM: E78.2 ICD-9-CM: 272.2 Obesity, Class II, BMI 35-39.9, with comorbidity     ICD-10-CM: E66.9 ICD-9-CM: 278.00 Essential hypertension, benign     ICD-10-CM: I10 
ICD-9-CM: 401.1 Vitals BP Pulse Temp Resp Height(growth percentile) Weight(growth percentile) 110/72 (BP 1 Location: Right arm, BP Patient Position: Sitting) 69 97.4 °F (36.3 °C) (Oral) 18 5' 8\" (1.727 m) 251 lb 6.4 oz (114 kg) SpO2 BMI Smoking Status 97% 38.23 kg/m2 Never Smoker Vitals History BMI and BSA Data Body Mass Index Body Surface Area  
 38.23 kg/m 2 2.34 m 2 Preferred Pharmacy Pharmacy Name Phone [facility] [address] , [address] [phone] Your Updated Medication List  
  
   
This list is accurate as of: 12/4/17  5:24 PM.  Always use your most recent med list.  
  
  
  
  
 atorvastatin 40 mg tablet Commonly known as:  LIPITOR  
TAKE 1 TABLET BY MOUTH EVERY DAY Blood-Glucose Meter monitoring kit Check glucose daily in the Am  Dx 250.02  
  
 empagliflozin 25 mg tablet Commonly known as:  JARDIANCE  
TAKE 1 TABLET BY MOUTH DAILY. glucose blood VI test strips strip Commonly known as:  blood glucose test  
Accu-Chek Comfort Curv,    Dx code 250.02    Test blood sugar twice daily or as directed by Dr. Leslie Cee. insulin glargine 100 unit/mL (3 mL) Inpn Commonly known as:  LANTUS SOLOSTAR  
INECT 60 UNIT UNDER THE SKIN DAILY Lancets Misc Accu-Chek Comfort Curv monitor   Test blood sugar twice a day or as directed by Dr. Leslie Cee. lisinopril 20 mg tablet Commonly known as:  PRINIVIL, ZESTRIL  
TAKE 1 TABLET BY MOUTH EVERY DAY  
  
 metFORMIN 1,000 mg tablet Commonly known as:  GLUCOPHAGE  
TAKE 1 TABLET BY MOUTH TWICE A DAY WITH MEALS  
  
 pantoprazole 40 mg tablet Commonly known as:  PROTONIX  
TAKE ONE TABLET BY MOUTH DAILY pen needle, diabetic 32 gauge x 1/5\" Ndle Commonly known as:  NOVOTWIST  
100 Each by Does Not Apply route daily. pioglitazone 30 mg tablet Commonly known as:  ACTOS  
TAKE ONE TABLET BY MOUTH DAILY Prescriptions Printed Refills  
 insulin glargine (LANTUS SOLOSTAR) 100 unit/mL (3 mL) inpn 5 Sig: INECT 60 UNIT UNDER THE SKIN DAILY Class: Print  
 pantoprazole (PROTONIX) 40 mg tablet 5 Sig: TAKE ONE TABLET BY MOUTH DAILY Class: Print  
 empagliflozin (JARDIANCE) 25 mg tablet 5 Sig: TAKE 1 TABLET BY MOUTH DAILY. Class: Print pioglitazone (ACTOS) 30 mg tablet 5 Sig: TAKE ONE TABLET BY MOUTH DAILY Class: Print  
 lisinopril (PRINIVIL, ZESTRIL) 20 mg tablet 5 Sig: TAKE 1 TABLET BY MOUTH EVERY DAY Class: Print  
 pen needle, diabetic (NOVOTWIST) 32 gauge x 1/5\" ndle 11 Si Each by Does Not Apply route daily. Class: Print Route: Does Not Apply  
 metFORMIN (GLUCOPHAGE) 1,000 mg tablet 5 Sig: TAKE 1 TABLET BY MOUTH TWICE A DAY WITH MEALS Class: Print  
 atorvastatin (LIPITOR) 40 mg tablet 5 Sig: TAKE 1 TABLET BY MOUTH EVERY DAY Class: Print We Performed the Following AMB POC HEMOGLOBIN A1C [15975 CPT(R)] LIPID PANEL [13735 CPT(R)] METABOLIC PANEL, COMPREHENSIVE [48531 CPT(R)] MICROALBUMIN, UR, RAND W/ MICROALBUMIN/CREA RATIO [id number] CPT(R)] REFERRAL TO GASTROENTEROLOGY [JHD05 Custom] Follow-up Instructions Return in about 4 months (around 2018) for diabetes follow up. Referral Information Referral ID Referred By Referred To  
  
 [id number] Ariel VINCENT [address]   
   [address] [address], [address] [facility] Visits Status Start Date End Date 1 New Request 17 If your referral has a status of pending review or denied, additional information will be sent to support the outcome of this decision. Patient Instructions Basal Insulin (Levemir/Lantus) Titration Schedule Starting Dose:  ______60_______ units Your goal with basal insulin titration is to obtain a blood glucose between 70 - 140. Raise your basal insulin dose by 3 units if your fasting blood glucose is greater than 140 for 2 days in a row. Each morning when you wake up, check your blood glucose with your home glucose monitor before you have anything to eat or drink and this gives you your fasting blood glucose result. your dosage adjustment for your Levemir/Lantus insulin is only based on the fasting blood glucose. Thus if you are at a 10 unit dose of Levemir/Lantus and your fasting blood glucose is 155 and the next day it is 160 you should raise your Levemir/Lantus dose to 13 units. Keep raising your Levemir/Lantus dose daily until you achieve a consistent fasting blood glucose < 140.  just remember only to increase it if the fasting glucose is >140 for two days in a row. So if it were 160 on day 1 and 135 on day 2 you would not raise it because it wasnt two days in a row! When your blood glucose gets between 70 - 140, stay at the same basal insulin dose that got you to this point. In other words, if you end up raising your daily basal insulin dose to 20 units to get to a fasting blood glucose between 70 - 140, stay at that 20 units dose as long as it allows you to maintain that 70 - 140 fasting blood glucose level. If your fasting blood glucose goes below 70, lower your basal insulin dosage by 2 units. In other words, if you wake up one morning and find your fasting blood glucose is 65, lower the basal insulin dose by 2 units (i.e. if you were taking 20 units of Levemir/Lantus the day before, now you reduce your dose to 18 units). Another examples of how to titrate your Levemir/Lantus dose: You take your first 10 unit dose of Levemir/Lantus and your fasting blood glucose the next day is 154. You dont increase yet. You check it again tomorrow. If it is greater than 140 then you should now raise your Levemir/Lantus dose to 13 units, which is 3 units more than the previous day. You keep checking you morning blood sugar everyday but dont make any additional increases unless it greater than 140 for 2 days in a row. Introducing [facility]! [facility] introduces [employer] patient portal. Now you can access parts of your medical record, email your doctor's office, and request medication refills online. 1. In your internet browser, go to [email or web address] 2. Click on the First Time User? Click Here link in the Sign In box. You will see the New Member Sign Up page. 3. Enter your [employer] Access Code exactly as it appears below. You will not need to use this code after youve completed the sign-up process. If you do not sign up before the expiration date, you must request a new code. · [employer] Access Code: [email or web address] Expires: 3/4/2018  5:17 PM 
 
4. Enter the last four digits of your Social Security Number (xxxx) and Date of Birth (mm/dd/yyyy) as indicated and click Submit. You will be taken to the next sign-up page. 5. Create a [employer] ID. This will be your [employer] login ID and cannot be changed, so think of one that is secure and easy to remember. 6. Create a [employer] password. You can change your password at any time. 7. Enter your Password Reset Question and Answer. This can be used at a later time if you forget your password. 8. Enter your e-mail address. You will receive e-mail notification when new information is available in [address] 9. Click Sign Up. You can now view and download portions of your medical record. 10. Click the Download Summary menu link to download a portable copy of your medical information. If you have questions, please visit the Frequently Asked Questions section of the [employer] website. Remember, [employer] is NOT to be used for urgent needs. For medical emergencies, dial 911. Now available from your iPhone and Android! Please provide this summary of care documentation to your next provider. Your primary care clinician is listed as Ángel Torres. If you have any questions after today's visit, please call [phone].

## 2017-12-04 NOTE — PROGRESS NOTES
HISTORY OF PRESENT ILLNESS  Charlene Monet is a 45 y.o. male. Blood pressure 110/72, pulse 69, temperature 97.4 °F (36.3 °C), temperature source Oral, resp. rate 18, height 5' 8\" (1.727 m), weight 251 lb 6.4 oz (114 kg), SpO2 97 %. Body mass index is 38.23 kg/(m^2). Chief Complaint   Patient presents with    Diabetes        HPI  Charlene Monet 45 y.o. male  presents to the office today for diabetes follow up. Hypertension: Bp at office today 110/72. Pt continues with Lisinopril 20mg daily. Bp control stable, continue current regimen. Hyperlipidemia: Lipid panel on 02/25/17 notable for total cholesterol 127, LDL 70, HDL 42, and triglycerides 77. Pt continues with Atorvastatin 40mg daily. Presumed stable, will assess levels when pt returns for fasting labs. DM2: A1c per POC today 7.9%, lowered from A1c of 8.0% on 07/17/17. Pt continues with Lantus 60units daily, Jardiance 25mg daily, Actos 30mg daily, Metformin 1,000mg BID. Presumed stable, will assess levels today. Pt reports that he still eats sweets every once in a while. Advised pt to continue to work on his diet and exercise. He also thinks that the Sandria Loss is helping with his memory and really likes the medication. Pt states that he has not been checking his sugars regularly. Advised pt that I want him to check his sugars at home and we may be able to adjust his Lantus dosage. Health maintenance: Pt reports that he has been having trouble swallowing and has reflux like symptoms. Advised pt that he needs an EGD and will refer him to Dr. Janae Miranda (GI).  Also advised pt that he needs to have an annual eye exam.       Current Outpatient Prescriptions   Medication Sig Dispense Refill    insulin glargine (LANTUS SOLOSTAR) 100 unit/mL (3 mL) inpn INECT 60 UNIT UNDER THE SKIN DAILY 15 Pen 5    pantoprazole (PROTONIX) 40 mg tablet TAKE ONE TABLET BY MOUTH DAILY 30 Tab 5    empagliflozin (JARDIANCE) 25 mg tablet TAKE 1 TABLET BY MOUTH DAILY. 30 Tab 5    pioglitazone (ACTOS) 30 mg tablet TAKE ONE TABLET BY MOUTH DAILY 30 Tab 5    lisinopril (PRINIVIL, ZESTRIL) 20 mg tablet TAKE 1 TABLET BY MOUTH EVERY DAY 30 Tab 5    pen needle, diabetic (NOVOTWIST) 32 gauge x 1/5\" ndle 100 Each by Does Not Apply route daily. 200 Pen Needle 11    metFORMIN (GLUCOPHAGE) 1,000 mg tablet TAKE 1 TABLET BY MOUTH TWICE A DAY WITH MEALS 60 Tab 5    atorvastatin (LIPITOR) 40 mg tablet TAKE 1 TABLET BY MOUTH EVERY DAY 30 Tab 5    glucose blood VI test strips (BLOOD GLUCOSE TEST) strip Accu-Chek Comfort Curv,    Dx code 250.02    Test blood sugar twice daily or as directed by Dr. Susannah Carlisle. 200 Strip 11    Lancets misc Accu-Chek Comfort Curv monitor   Test blood sugar twice a day or as directed by Dr. Susannah Carlisle. 200 Each 11    Blood-Glucose Meter monitoring kit Check glucose daily in the Am  Dx 250.02 1 kit 0     Allergies   Allergen Reactions    Ppa/Pe/Phenyltolox/Cpm Td Unknown (comments)     Allergies:PPA     Past Medical History:   Diagnosis Date    Allergic rhinitis due to other allergen 3/5/2010    Colon polyp     Family history of colon cancer in mother     Hypertension     Mixed hyperlipidemia 3/5/2010    Obesity, unspecified 3/5/2010    PUD (peptic ulcer disease)     15 years ago   [de-identified] Pure hyperglyceridemia 3/5/2010    Type II or unspecified type diabetes mellitus without mention of complication, not stated as uncontrolled 3/5/2010     Past Surgical History:   Procedure Laterality Date    HX COLONOSCOPY  03/2016    Dr. Kalpana Ventura     Family History   Problem Relation Age of Onset    Cancer Mother     Heart Disease Father      Social History   Substance Use Topics    Smoking status: Never Smoker    Smokeless tobacco: Never Used    Alcohol use 2.0 oz/week     4 Standard drinks or equivalent per week        Review of Systems   Constitutional: Negative. Negative for malaise/fatigue. Eyes: Negative for blurred vision.    Respiratory: Negative for shortness of breath. Cardiovascular: Negative for chest pain and leg swelling. Musculoskeletal: Negative. Neurological: Negative. Negative for dizziness and headaches. All other systems reviewed and are negative. Physical Exam   Constitutional: He is oriented to person, place, and time. He appears well-developed and well-nourished. HENT:   Head: Normocephalic and atraumatic. Neck: Carotid bruit is not present. Cardiovascular: Normal rate, regular rhythm, normal heart sounds and intact distal pulses. Exam reveals no gallop and no friction rub. No murmur heard. Pulmonary/Chest: Effort normal and breath sounds normal. No respiratory distress. He has no wheezes. He has no rales. He exhibits no tenderness. Neurological: He is alert and oriented to person, place, and time. Psychiatric: He has a normal mood and affect. His behavior is normal. Judgment and thought content normal.   Nursing note and vitals reviewed. ASSESSMENT and PLAN  Diagnoses and all orders for this visit:    1. Uncontrolled type 2 diabetes mellitus with hyperglycemia, with long-term current use of insulin ([facility])  -     METABOLIC PANEL, COMPREHENSIVE  -     LIPID PANEL  -     MICROALBUMIN, UR, RAND W/ MICROALBUMIN/CREA RATIO  -     AMB POC HEMOGLOBIN A1C  - A1c per POC today 7.9%, lowered from A1c of 8.0% on 07/17/17. Pt continues with Lantus 60units daily, Jardiance 25mg daily, Actos 30mg daily, Metformin 1,000mg BID. Presumed stable, will assess levels today. - Advised pt to continue to work on his diet and exercise  - Advised pt that I want him to check his sugars at home and we may be able to adjust his Lantus dosage. 2. Gastroesophageal reflux disease without esophagitis  -     [facility] (Dr. Huang Rai)  - Advised pt that he needs an EGD and will refer him to Dr. Huang Rai (GI).     3. Mixed hyperlipidemia  -     LIPID PANEL  - Lipid panel on 02/25/17 notable for total cholesterol 127, LDL 70, HDL 42, and triglycerides 77. Pt continues with Atorvastatin 40mg daily. Presumed stable, will assess levels when pt returns for fasting labs. 4. Obesity, Class II, BMI 35-39.9, with comorbidity         - I have reviewed/discussed the above normal BMI with the patient. I have recommended the following interventions: dietary management education, guidance, and counseling, encourage exercise and monitor weight . 5. Essential hypertension, benign          -  Bp at office today 110/72. Pt continues with Lisinopril 20mg daily. Bp control stable, continue current regimen. Follow-up Disposition:  Return in about 3 months (around 3/4/2018) for diabetes follow up. Medication risks/benefits/costs/interactions/alternatives discussed with patient. Advised patient to call back or return to office if symptoms worsen/change/persist.  If patient cannot reach us or should anything more severe/urgent arise he/she should proceed directly to the nearest emergency department. Discussed expected course/resolution/complications of diagnosis in detail with patient. Patient given a written after visit summary which includes her diagnoses, current medications and vitals. Patient expressed understanding with the diagnosis and plan. Written by hoa Richey, as dictated by Saturnino Arias M.D.   I have reviewed and agree with the above note and have made corrections where appropriate, Dr. Reanna Hernandes MD

## 2017-12-04 NOTE — PROGRESS NOTES
Chief Complaint   Patient presents with    Diabetes       Reviewed Record in preparation for visit and have obtained necessary documentation. Identified pt with two pt identifiers (Name @ )    Health Maintenance Due   Topic    COLONOSCOPY     EYE EXAM RETINAL OR DILATED Q1          1. Have you been to the ER, urgent care clinic since your last visit? Hospitalized since your last visit? No    2. Have you seen or consulted any other health care providers outside of the [address] [place] [address] since your last visit? Include any pap smears or colon screening.  No

## 2018-01-05 NOTE — PROGRESS NOTES
[phone] spoke to patient and reminded him per patient will come in tomorrow 1/6/2018 to get his labs done

## 2018-02-20 LAB
ALBUMIN SERPL-MCNC: 4.4 G/DL (ref 3.5–5.5)
ALBUMIN/CREAT UR: 5.3 (ref 0–30)
ALBUMIN/GLOB SERPL: 1.9 {RATIO} (ref 1.2–2.2)
ALP SERPL-CCNC: 65 IU/L (ref 39–117)
ALT SERPL-CCNC: 18 IU/L (ref 0–44)
AST SERPL-CCNC: 18 IU/L (ref 0–40)
BILIRUB SERPL-MCNC: 0.7 MG/DL (ref 0–1.2)
BUN SERPL-MCNC: 14 MG/DL (ref 6–24)
BUN/CREAT SERPL: 15 (ref 9–20)
CALCIUM SERPL-MCNC: 9.3 MG/DL (ref 8.7–10.2)
CHLORIDE SERPL-SCNC: 98 MMOL/L (ref 96–106)
CHOLEST SERPL-MCNC: 139 MG/DL (ref 100–199)
CO2 SERPL-SCNC: 24 MMOL/L (ref 18–29)
CREAT SERPL-MCNC: 0.91 MG/DL (ref 0.76–1.27)
CREAT UR-MCNC: 70.3 MG/DL
GFR SERPLBLD CREATININE-BSD FMLA CKD-EPI: 110 ML/MIN/1.73
GFR SERPLBLD CREATININE-BSD FMLA CKD-EPI: 95 ML/MIN/1.73
GLOBULIN SER CALC-MCNC: 2.3 G/DL (ref 1.5–4.5)
GLUCOSE SERPL-MCNC: 130 MG/DL (ref 65–99)
HDLC SERPL-MCNC: 40 MG/DL
INTERPRETATION, 910389: NORMAL
LDLC SERPL CALC-MCNC: 79 MG/DL (ref 0–99)
MICROALBUMIN UR-MCNC: 3.7 UG/ML
POTASSIUM SERPL-SCNC: 5 MMOL/L (ref 3.5–5.2)
PROT SERPL-MCNC: 6.7 G/DL (ref 6–8.5)
SODIUM SERPL-SCNC: 140 MMOL/L (ref 134–144)
TRIGL SERPL-MCNC: 100 MG/DL (ref 0–149)
VLDLC SERPL CALC-MCNC: 20 MG/DL (ref 5–40)

## 2018-05-02 ENCOUNTER — OFFICE VISIT (OUTPATIENT)
Dept: FAMILY MEDICINE CLINIC | Age: 53
End: 2018-05-02

## 2018-05-02 VITALS
DIASTOLIC BLOOD PRESSURE: 88 MMHG | RESPIRATION RATE: 18 BRPM | WEIGHT: 263 LBS | SYSTOLIC BLOOD PRESSURE: 129 MMHG | BODY MASS INDEX: 39.86 KG/M2 | HEIGHT: 68 IN | TEMPERATURE: 97.9 F | HEART RATE: 74 BPM | OXYGEN SATURATION: 95 %

## 2018-05-02 DIAGNOSIS — E11.65 UNCONTROLLED TYPE 2 DIABETES MELLITUS WITH HYPERGLYCEMIA, WITH LONG-TERM CURRENT USE OF INSULIN (HCC): Primary | ICD-10-CM

## 2018-05-02 DIAGNOSIS — I10 ESSENTIAL HYPERTENSION, BENIGN: ICD-10-CM

## 2018-05-02 DIAGNOSIS — E66.01 SEVERE OBESITY (BMI 35.0-39.9) WITH COMORBIDITY (HCC): ICD-10-CM

## 2018-05-02 DIAGNOSIS — Z79.4 UNCONTROLLED TYPE 2 DIABETES MELLITUS WITH HYPERGLYCEMIA, WITH LONG-TERM CURRENT USE OF INSULIN (HCC): Primary | ICD-10-CM

## 2018-05-02 DIAGNOSIS — E78.2 MIXED HYPERLIPIDEMIA: ICD-10-CM

## 2018-05-02 DIAGNOSIS — N52.9 ERECTILE DYSFUNCTION, UNSPECIFIED ERECTILE DYSFUNCTION TYPE: ICD-10-CM

## 2018-05-02 LAB — HBA1C MFR BLD HPLC: 7.9 %

## 2018-05-02 RX ORDER — TADALAFIL 20 MG/1
20 TABLET ORAL AS NEEDED
Qty: 8 TAB | Refills: 5 | Status: SHIPPED | OUTPATIENT
Start: 2018-05-02 | End: 2018-05-02 | Stop reason: SDUPTHER

## 2018-05-02 RX ORDER — TADALAFIL 20 MG/1
20 TABLET ORAL AS NEEDED
Qty: 8 TAB | Refills: 5 | Status: SHIPPED | OUTPATIENT
Start: 2018-05-02 | End: 2019-02-11 | Stop reason: ALTCHOICE

## 2018-05-02 NOTE — MR AVS SNAPSHOT
[address] 
[phone] Patient: Marguerite Mitchell MRN: [id number] OMK:[birth year] Visit Information Date & Time Provider Department Dept. Phone Encounter #  
 5/2/2018  4:00 PM Karel Wright, [address] [facility] [phone] [id number] Follow-up Instructions Return in about 3 months (around 8/2/2018) for diabetes follow up. Upcoming Health Maintenance Date Due COLONOSCOPY 12/7/2015 EYE EXAM RETINAL OR DILATED Q1 9/13/2017 HEMOGLOBIN A1C Q6M 6/4/2018 FOOT EXAM Q1 7/17/2018 Influenza Age 5 to Adult 8/1/2018 MICROALBUMIN Q1 2/19/2019 LIPID PANEL Q1 2/19/2019 DTaP/Tdap/Td series (2 - Td) 12/30/2024 Allergies as of 5/2/2018  Review Complete On: 5/2/2018 By: Karel Wright MD  
  
 Severity Noted Reaction Type Reactions Ppa/pe/phenyltolox/cpm Td  03/05/2010    Unknown (comments) Allergies:PPA Current Immunizations  Reviewed on 12/1/2017 Name Date Influenza Vaccine 10/15/2017 Influenza Vaccine (Quad) PF 12/30/2014  4:36 PM  
 Influenza Vaccine Split 2/10/2011 Pneumococcal Polysaccharide (PPSV-23) 12/5/2007 Tdap 12/30/2014  4:34 PM  
  
 Not reviewed this visit You Were Diagnosed With   
  
 Codes Comments Uncontrolled type 2 diabetes mellitus with hyperglycemia, with long-term current use of insulin (HCC)    -  Primary ICD-10-CM: E11.65, Z79.4 ICD-9-CM: 250.02, V58.67 Essential hypertension, benign     ICD-10-CM: I10 
ICD-9-CM: 401.1 Mixed hyperlipidemia     ICD-10-CM: E78.2 ICD-9-CM: 272.2 Severe obesity (BMI 35.0-39. 9) with comorbidity ([facility] [address]     ICD-10-CM: E66.01 
ICD-9-CM: 278.01 Erectile dysfunction, unspecified erectile dysfunction type     ICD-10-CM: N52.9 ICD-9-CM: 607.84 Vitals BP Pulse Temp Resp Height(growth percentile) Weight(growth percentile) 129/88 (BP 1 Location: Left arm, BP Patient Position: Sitting) 74 97.9 °F (36.6 °C) (Oral) 18 5' 8\" (1.727 m) 263 lb (119.3 kg) SpO2 BMI Smoking Status 95% 39.99 kg/m2 Never Smoker BMI and BSA Data Body Mass Index Body Surface Area  
 39.99 kg/m 2 2.39 m 2 Preferred Pharmacy Pharmacy Name Phone [id number] [facility] [address] [facility] [address] [facility] [address] [phone] Your Updated Medication List  
  
   
This list is accurate as of 5/2/18  5:25 PM.  Always use your most recent med list.  
  
  
  
  
 atorvastatin 40 mg tablet Commonly known as:  LIPITOR  
TAKE 1 TABLET BY MOUTH EVERY DAY Blood-Glucose Meter monitoring kit Check glucose daily in the Am  Dx 250.02  
  
 dulaglutide 0.75 mg/0.5 mL sub-q pen Commonly known as:  TRULICITY  
0.5 mL by SubCUTAneous route every seven (7) days. empagliflozin 25 mg tablet Commonly known as:  JARDIANCE  
TAKE 1 TABLET BY MOUTH DAILY. glucose blood VI test strips strip Commonly known as:  blood glucose test  
Accu-Chek Comfort Curv,    Dx code 250.02    Test blood sugar twice daily or as directed by Dr. Weston Ward. insulin glargine 100 unit/mL (3 mL) Inpn Commonly known as:  LANTUS SOLOSTAR U-100 INSULIN  
INECT 60 UNIT UNDER THE SKIN DAILY Lancets [facility] Accu-Chek Comfort Curv monitor   Test blood sugar twice a day or as directed by Dr. Weston Ward. lisinopril 20 mg tablet Commonly known as:  PRINIVIL, ZESTRIL  
TAKE 1 TABLET BY MOUTH EVERY DAY  
  
 metFORMIN 1,000 mg tablet Commonly known as:  GLUCOPHAGE  
TAKE 1 TABLET BY MOUTH TWICE A DAY WITH MEALS  
  
 pantoprazole 40 mg tablet Commonly known as:  PROTONIX  
TAKE ONE TABLET BY MOUTH DAILY pen needle, diabetic 32 gauge x 1/5\" Ndle Commonly known as:  NOVOTWIST  
100 Each by Does Not Apply route daily. pioglitazone 30 mg tablet Commonly known as:  ACTOS  
TAKE ONE TABLET BY MOUTH DAILY Prescriptions Sent to Pharmacy Refills  
 dulaglutide (TRULICITY) 5.43 VD/1.2 mL sub-q pen 2 Si.5 mL by SubCUTAneous route every seven (7) days. Class: Normal  
 Pharmacy: [id number], [address] [place] [address] #: [phone] Route: SubCUTAneous We Performed the Following AMB POC HEMOGLOBIN A1C [42081 CPT(R)] Follow-up Instructions Return in about 3 months (around 2018) for diabetes follow up. Patient Instructions Body Mass Index: Care Instructions Your Care Instructions Body mass index (BMI) can help you see if your weight is raising your risk for health problems. It uses a formula to compare how much you weigh with how tall you are. · A BMI lower than 18.5 is considered underweight. · A BMI between 18.5 and 24.9 is considered healthy. · A BMI between 25 and 29.9 is considered overweight. A BMI of 30 or higher is considered obese. If your BMI is in the normal range, it means that you have a lower risk for weight-related health problems. If your BMI is in the overweight or obese range, you may be at increased risk for weight-related health problems, such as high blood pressure, heart disease, stroke, arthritis or joint pain, and diabetes. If your BMI is in the underweight range, you may be at increased risk for health problems such as fatigue, lower protection (immunity) against illness, muscle loss, bone loss, hair loss, and hormone problems. BMI is just one measure of your risk for weight-related health problems. You may be at higher risk for health problems if you are not active, you eat an unhealthy diet, or you drink too much alcohol or use tobacco products. Follow-up care is a key part of your treatment and safety. Be sure to make and go to all appointments, and call your doctor if you are having problems. It's also a good idea to know your test results and keep a list of the medicines you take. How can you care for yourself at home? · Practice healthy eating habits. This includes eating plenty of fruits, vegetables, whole grains, lean protein, and low-fat dairy. · If your doctor recommends it, get more exercise. Walking is a good choice. Bit by bit, increase the amount you walk every day. Try for at least 30 minutes on most days of the week. · Do not smoke. Smoking can increase your risk for health problems. If you need help quitting, talk to your doctor about stop-smoking programs and medicines. These can increase your chances of quitting for good. · Limit alcohol to 2 drinks a day for men and 1 drink a day for women. Too much alcohol can cause health problems. If you have a BMI higher than 25 · Your doctor may do other tests to check your risk for weight-related health problems. This may include measuring the distance around your waist. A waist measurement of more than 40 inches in men or 35 inches in women can increase the risk of weight-related health problems. · Talk with your doctor about steps you can take to stay healthy or improve your health. You may need to make lifestyle changes to lose weight and stay healthy, such as changing your diet and getting regular exercise. If you have a BMI lower than 18.5 · Your doctor may do other tests to check your risk for health problems. · Talk with your doctor about steps you can take to stay healthy or improve your health. You may need to make lifestyle changes to gain or maintain weight and stay healthy, such as getting more healthy foods in your diet and doing exercises to build muscle. Where can you learn more? Go to http://zak-trista.info/. Enter S176 in the search box to learn more about \"Body Mass Index: Care Instructions. \" Current as of: October 13, 2016 Content Version: 11.4 © 9163-1345 Healthwise, Incorporated.  Care instructions adapted under license by [address] (which disclaims liability or warranty for this information). If you have questions about a medical condition or this instruction, always ask your healthcare professional. [employer] any warranty or liability for your use of this information. Introducing [facility]! [address] [facility] introduces [employer] patient portal. Now you can access parts of your medical record, email your doctor's office, and request medication refills online. 1. In your internet browser, go to [email or web address] 2. Click on the First Time User? Click Here link in the Sign In box. You will see the New Member Sign Up page. 3. Enter your [employer] Access Code exactly as it appears below. You will not need to use this code after youve completed the sign-up process. If you do not sign up before the expiration date, you must request a new code. · [employer] Access Code: [email or web address] Expires: 7/31/2018  5:25 PM 
 
4. Enter the last four digits of your Social Security Number (xxxx) and Date of Birth (mm/dd/yyyy) as indicated and click Submit. You will be taken to the next sign-up page. 5. Create a [employer] ID. This will be your [employer] login ID and cannot be changed, so think of one that is secure and easy to remember. 6. Create a [employer] password. You can change your password at any time. 7. Enter your Password Reset Question and Answer. This can be used at a later time if you forget your password. 8. Enter your e-mail address. You will receive e-mail notification when new information is available in [address] 9. Click Sign Up. You can now view and download portions of your medical record. 10. Click the Download Summary menu link to download a portable copy of your medical information. If you have questions, please visit the Frequently Asked Questions section of the [employer] website.  Remember, [employer] is NOT to be used for urgent needs. For medical emergencies, dial 911. Now available from your iPhone and Android! Please provide this summary of care documentation to your next provider. Your primary care clinician is listed as Luke Quiroz. If you have any questions after today's visit, please call [phone].

## 2018-05-02 NOTE — PROGRESS NOTES
Chief Complaint   Patient presents with    Follow-up     3 month f/u    Diabetes     1. Have you been to the ER, urgent care clinic since your last visit? Hospitalized since your last visit? No    2. Have you seen or consulted any other health care providers outside of the [address] [place] [address] since your last visit? Include any pap smears or colon screening.  No

## 2018-05-02 NOTE — PATIENT INSTRUCTIONS
Body Mass Index: Care Instructions  Your Care Instructions    Body mass index (BMI) can help you see if your weight is raising your risk for health problems. It uses a formula to compare how much you weigh with how tall you are. · A BMI lower than 18.5 is considered underweight. · A BMI between 18.5 and 24.9 is considered healthy. · A BMI between 25 and 29.9 is considered overweight. A BMI of 30 or higher is considered obese. If your BMI is in the normal range, it means that you have a lower risk for weight-related health problems. If your BMI is in the overweight or obese range, you may be at increased risk for weight-related health problems, such as high blood pressure, heart disease, stroke, arthritis or joint pain, and diabetes. If your BMI is in the underweight range, you may be at increased risk for health problems such as fatigue, lower protection (immunity) against illness, muscle loss, bone loss, hair loss, and hormone problems. BMI is just one measure of your risk for weight-related health problems. You may be at higher risk for health problems if you are not active, you eat an unhealthy diet, or you drink too much alcohol or use tobacco products. Follow-up care is a key part of your treatment and safety. Be sure to make and go to all appointments, and call your doctor if you are having problems. It's also a good idea to know your test results and keep a list of the medicines you take. How can you care for yourself at home? · Practice healthy eating habits. This includes eating plenty of fruits, vegetables, whole grains, lean protein, and low-fat dairy. · If your doctor recommends it, get more exercise. Walking is a good choice. Bit by bit, increase the amount you walk every day. Try for at least 30 minutes on most days of the week. · Do not smoke. Smoking can increase your risk for health problems. If you need help quitting, talk to your doctor about stop-smoking programs and medicines. These can increase your chances of quitting for good. · Limit alcohol to 2 drinks a day for men and 1 drink a day for women. Too much alcohol can cause health problems. If you have a BMI higher than 25  · Your doctor may do other tests to check your risk for weight-related health problems. This may include measuring the distance around your waist. A waist measurement of more than 40 inches in men or 35 inches in women can increase the risk of weight-related health problems. · Talk with your doctor about steps you can take to stay healthy or improve your health. You may need to make lifestyle changes to lose weight and stay healthy, such as changing your diet and getting regular exercise. If you have a BMI lower than 18.5  · Your doctor may do other tests to check your risk for health problems. · Talk with your doctor about steps you can take to stay healthy or improve your health. You may need to make lifestyle changes to gain or maintain weight and stay healthy, such as getting more healthy foods in your diet and doing exercises to build muscle. Where can you learn more? Go to http://zak-trista.info/. Enter S176 in the search box to learn more about \"Body Mass Index: Care Instructions. \"  Current as of: October 13, 2016  Content Version: 11.4  © 4865-5570 Healthwise, Incorporated. Care instructions adapted under license by [employer] (which disclaims liability or warranty for this information). If you have questions about a medical condition or this instruction, always ask your healthcare professional. [address] any warranty or liability for your use of this information.

## 2018-05-02 NOTE — ASSESSMENT & PLAN NOTE
Worsening, based on history, physical exam and review of pertinent labs, studies and medications; meds reconciled; changes to treatment plan as per orders, lifestyle modifications recommended, medication compliance emphasized. Key Obesity Meds             dulaglutide (TRULICITY) 8.09 PV/4.0 mL sub-q pen  (Taking) 0.5 mL by SubCUTAneous route every seven (7) days.     metFORMIN (GLUCOPHAGE) 1,000 mg tablet  (Taking) TAKE 1 TABLET BY MOUTH TWICE A DAY WITH MEALS        Lab Results   Component Value Date/Time    Glucose 130 02/19/2018 10:41 AM    Cholesterol, total 139 02/19/2018 10:41 AM    HDL Cholesterol 40 02/19/2018 10:41 AM    LDL, calculated 79 02/19/2018 10:41 AM    Triglyceride 100 02/19/2018 10:41 AM    Sodium 140 02/19/2018 10:41 AM    Potassium 5.0 02/19/2018 10:41 AM    ALT (SGPT) 18 02/19/2018 10:41 AM    AST (SGOT) 18 02/19/2018 10:41 AM

## 2018-05-02 NOTE — PROGRESS NOTES
HISTORY OF PRESENT ILLNESS  Malik Robert is a 45 y.o. male. Blood pressure 129/88, pulse 74, temperature 97.9 °F (36.6 °C), temperature source Oral, resp. rate 18, height 5' 8\" (1.727 m), weight 263 lb (119.3 kg), SpO2 95 %. Body mass index is 39.99 kg/(m^2). Chief Complaint   Patient presents with    Follow-up     3 month f/u    Diabetes        HPI  Malik Robert 45 y.o. male  presents to the office today for diabetes follow up. Hypertension: Bp at office today 129/88. Pt continues with Lisinopril 20mg daily. Bp control stable, continue current regimen. Hyperlipidemia: Lipid panel on 02/19/18 notable for total cholesterol 139, LDL 79, HDL 40, and triglycerides 100. Pt continues with Atorvastatin 40mg daily. Advised pt to work on his diet and exercise. DM2: A1c per POC today 7.9%, unchanged from A1c of 7.9% on 12/04/17. Pt continues with Lantus 60units daily, Jardiance 25mg daily, Actos 30mg daily, and Metformin 1,000mg BID. Pt states that he was supposed to increase his Lantus, but he only received a one month supply. Advised pt that I want him to really work on his diet and exercise to help lower his A1c. Discussed with pt that I want to start him on Trulicity 3.9UL weekly and will help him get a prior auth for his insurance company. Health maintenance: Will get pt's colonoscopy records from Dr. Ariane Huizar (GI). Advised pt that he is due for an eye exam. Pt notes that he has recently begun having issues with erectile dysfunction. Will start pt on Cialis 20mg for his erectile dysfunction. Pt states that he does not want to pay full price for Cialis, but will take a script if he changes his mind. Discussed with pt that if he can get his weight down and work on his sugars, he may see improvement with his ED.         Current Outpatient Prescriptions   Medication Sig Dispense Refill    dulaglutide (TRULICITY) 3.87 SQUIRES/0.5 mL sub-q pen 0.5 mL by SubCUTAneous route every seven (7) days. 4 Pen 2    tadalafil (CIALIS) 20 mg tablet Take 1 Tab by mouth as needed. 8 Tab 5    insulin glargine (LANTUS SOLOSTAR) 100 unit/mL (3 mL) inpn INECT 60 UNIT UNDER THE SKIN DAILY 15 Pen 5    pantoprazole (PROTONIX) 40 mg tablet TAKE ONE TABLET BY MOUTH DAILY 30 Tab 5    empagliflozin (JARDIANCE) 25 mg tablet TAKE 1 TABLET BY MOUTH DAILY. 30 Tab 5    pioglitazone (ACTOS) 30 mg tablet TAKE ONE TABLET BY MOUTH DAILY 30 Tab 5    lisinopril (PRINIVIL, ZESTRIL) 20 mg tablet TAKE 1 TABLET BY MOUTH EVERY DAY 30 Tab 5    pen needle, diabetic (NOVOTWIST) 32 gauge x 1/5\" ndle 100 Each by Does Not Apply route daily. 200 Pen Needle 11    metFORMIN (GLUCOPHAGE) 1,000 mg tablet TAKE 1 TABLET BY MOUTH TWICE A DAY WITH MEALS 60 Tab 5    atorvastatin (LIPITOR) 40 mg tablet TAKE 1 TABLET BY MOUTH EVERY DAY 30 Tab 5    glucose blood VI test strips (BLOOD GLUCOSE TEST) strip Accu-Chek Comfort Curv,    Dx code 250.02    Test blood sugar twice daily or as directed by Dr. Jayme Kebede. 200 Strip 11    Lancets misc Accu-Chek Comfort Curv monitor   Test blood sugar twice a day or as directed by Dr. Jayme Kebede.  200 Each 11    Blood-Glucose Meter monitoring kit Check glucose daily in the Am  Dx 250.02 1 kit 0     Allergies   Allergen Reactions    Ppa/Pe/Phenyltolox/Cpm Td Unknown (comments)     Allergies:PPA     Past Medical History:   Diagnosis Date    Allergic rhinitis due to other allergen 3/5/2010    Colon polyp     Family history of colon cancer in mother     Hypertension     Mixed hyperlipidemia 3/5/2010    Obesity, unspecified 3/5/2010    PUD (peptic ulcer disease)     15 years ago   Janelle He Pure hyperglyceridemia 3/5/2010    Type II or unspecified type diabetes mellitus without mention of complication, not stated as uncontrolled 3/5/2010     Past Surgical History:   Procedure Laterality Date    HX COLONOSCOPY  03/2016    Dr. La Au     Family History   Problem Relation Age of Onset    Cancer Mother     Heart Disease Father Social History   Substance Use Topics    Smoking status: Never Smoker    Smokeless tobacco: Never Used    Alcohol use 2.0 oz/week     4 Standard drinks or equivalent per week        Review of Systems   Constitutional: Negative. Negative for malaise/fatigue. Eyes: Negative for blurred vision. Respiratory: Negative for shortness of breath. Cardiovascular: Negative for chest pain and leg swelling. Musculoskeletal: Negative. Neurological: Negative. Negative for dizziness and headaches. All other systems reviewed and are negative. Physical Exam   Constitutional: He is oriented to person, place, and time. He appears well-developed and well-nourished. HENT:   Head: Normocephalic and atraumatic. Neck: Carotid bruit is not present. Cardiovascular: Normal rate, regular rhythm, normal heart sounds and intact distal pulses. Exam reveals no gallop and no friction rub. No murmur heard. Pulmonary/Chest: Effort normal and breath sounds normal. No respiratory distress. He has no wheezes. He has no rales. He exhibits no tenderness. Neurological: He is alert and oriented to person, place, and time. Psychiatric: He has a normal mood and affect. His behavior is normal. Judgment and thought content normal.   Nursing note and vitals reviewed. ASSESSMENT and PLAN  Diagnoses and all orders for this visit:    1. Uncontrolled type 2 diabetes mellitus with hyperglycemia, with long-term current use of insulin (HCC)  Assessment & Plan:  Uncontrolled, based on history, physical exam and review of pertinent labs, studies and medications; meds reconciled; changes to treatment plan as per orders, lifestyle modifications recommended, medication compliance emphasized. Key Antihyperglycemic Medications             dulaglutide (TRULICITY) 6.67 IW/8.8 mL sub-q pen  (Taking) 0.5 mL by SubCUTAneous route every seven (7) days.     insulin glargine (LANTUS SOLOSTAR) 100 unit/mL (3 mL) inpn  (Taking) INECT 60 UNIT UNDER THE SKIN DAILY    empagliflozin (JARDIANCE) 25 mg tablet  (Taking) TAKE 1 TABLET BY MOUTH DAILY. pioglitazone (ACTOS) 30 mg tablet  (Taking) TAKE ONE TABLET BY MOUTH DAILY    metFORMIN (GLUCOPHAGE) 1,000 mg tablet  (Taking) TAKE 1 TABLET BY MOUTH TWICE A DAY WITH MEALS        Other Key Diabetic Medications             lisinopril (PRINIVIL, ZESTRIL) 20 mg tablet  (Taking) TAKE 1 TABLET BY MOUTH EVERY DAY    atorvastatin (LIPITOR) 40 mg tablet  (Taking) TAKE 1 TABLET BY MOUTH EVERY DAY        Lab Results   Component Value Date/Time    Hemoglobin A1c (POC) 7.9 05/02/2018 04:37 PM    Glucose 130 02/19/2018 10:41 AM    Creatinine 0.91 02/19/2018 10:41 AM    Microalb/Creat ratio (ug/mg creat.) 5.3 02/19/2018 10:41 AM    Cholesterol, total 139 02/19/2018 10:41 AM    HDL Cholesterol 40 02/19/2018 10:41 AM    LDL, calculated 79 02/19/2018 10:41 AM    Triglyceride 100 02/19/2018 10:41 AM     Diabetic Foot and Eye Exam HM Status   Topic Date Due    Eye Exam  09/13/2017    Diabetic Foot Care  07/17/2018       Orders:  -     AMB POC HEMOGLOBIN A1C  -     dulaglutide (TRULICITY) 9.97 VA/0.8 mL sub-q pen; 0.5 mL by SubCUTAneous route every seven (7) days. 2. Essential hypertension, benign        - Bp at office today 129/88. Pt continues with Lisinopril 20mg daily. Bp control stable, continue current regimen. 3. Mixed hyperlipidemia         - Lipid panel on 02/19/18 notable for total cholesterol 139, LDL 79, HDL 40, and triglycerides 100. Pt continues with Atorvastatin 40mg daily. Advised pt to work on his diet and exercise. 4. Severe obesity (BMI 35.0-39. 9) with comorbidity ([address]  Assessment & Plan:  Worsening, based on history, physical exam and review of pertinent labs, studies and medications; meds reconciled; changes to treatment plan as per orders, lifestyle modifications recommended, medication compliance emphasized.   Key Obesity Meds             dulaglutide (TRULICITY) 1.88 PW/4.8 mL sub-q pen (Taking) 0.5 mL by SubCUTAneous route every seven (7) days. metFORMIN (GLUCOPHAGE) 1,000 mg tablet  (Taking) TAKE 1 TABLET BY MOUTH TWICE A DAY WITH MEALS        Lab Results   Component Value Date/Time    Glucose 130 02/19/2018 10:41 AM    Cholesterol, total 139 02/19/2018 10:41 AM    HDL Cholesterol 40 02/19/2018 10:41 AM    LDL, calculated 79 02/19/2018 10:41 AM    Triglyceride 100 02/19/2018 10:41 AM    Sodium 140 02/19/2018 10:41 AM    Potassium 5.0 02/19/2018 10:41 AM    ALT (SGPT) 18 02/19/2018 10:41 AM    AST (SGOT) 18 02/19/2018 10:41 AM       5. Erectile dysfunction, unspecified erectile dysfunction type  -     tadalafil (CIALIS) 20 mg tablet; Take 1 Tab by mouth as needed. - Will start pt on Cialis 20mg for his erectile dysfunction. Pt states that he does not want to pay full price for Cialis, but will take a script if he changes his mind. - Discussed with pt that if he can get his weight down and work on his sugars, he may see improvement with his ED. Follow-up Disposition:  Return in about 3 months (around 8/2/2018) for diabetes follow up. Medication risks/benefits/costs/interactions/alternatives discussed with patient. Advised patient to call back or return to office if symptoms worsen/change/persist.  If patient cannot reach us or should anything more severe/urgent arise he/she should proceed directly to the nearest emergency department. Discussed expected course/resolution/complications of diagnosis in detail with patient. Patient given a written after visit summary which includes her diagnoses, current medications and vitals. Patient expressed understanding with the diagnosis and plan. Written by hoa Newsome, as dictated by Kylah Castro M.D.   I have reviewed and agree with the above note and have made corrections where appropriate, Dr. Denny Saab MD

## 2018-05-02 NOTE — ASSESSMENT & PLAN NOTE
Uncontrolled, based on history, physical exam and review of pertinent labs, studies and medications; meds reconciled; changes to treatment plan as per orders, lifestyle modifications recommended, medication compliance emphasized. Key Antihyperglycemic Medications             dulaglutide (TRULICITY) 3.72 WD/3.6 mL sub-q pen  (Taking) 0.5 mL by SubCUTAneous route every seven (7) days. insulin glargine (LANTUS SOLOSTAR) 100 unit/mL (3 mL) inpn  (Taking) INECT 60 UNIT UNDER THE SKIN DAILY    empagliflozin (JARDIANCE) 25 mg tablet  (Taking) TAKE 1 TABLET BY MOUTH DAILY.     pioglitazone (ACTOS) 30 mg tablet  (Taking) TAKE ONE TABLET BY MOUTH DAILY    metFORMIN (GLUCOPHAGE) 1,000 mg tablet  (Taking) TAKE 1 TABLET BY MOUTH TWICE A DAY WITH MEALS        Other Key Diabetic Medications             lisinopril (PRINIVIL, ZESTRIL) 20 mg tablet  (Taking) TAKE 1 TABLET BY MOUTH EVERY DAY    atorvastatin (LIPITOR) 40 mg tablet  (Taking) TAKE 1 TABLET BY MOUTH EVERY DAY        Lab Results   Component Value Date/Time    Hemoglobin A1c (POC) 7.9 05/02/2018 04:37 PM    Glucose 130 02/19/2018 10:41 AM    Creatinine 0.91 02/19/2018 10:41 AM    Microalb/Creat ratio (ug/mg creat.) 5.3 02/19/2018 10:41 AM    Cholesterol, total 139 02/19/2018 10:41 AM    HDL Cholesterol 40 02/19/2018 10:41 AM    LDL, calculated 79 02/19/2018 10:41 AM    Triglyceride 100 02/19/2018 10:41 AM     Diabetic Foot and Eye Exam HM Status   Topic Date Due    Eye Exam  09/13/2017    Diabetic Foot Care  07/17/2018

## 2018-09-07 DIAGNOSIS — Z79.4 UNCONTROLLED TYPE 2 DIABETES MELLITUS WITH HYPERGLYCEMIA, WITH LONG-TERM CURRENT USE OF INSULIN (HCC): ICD-10-CM

## 2018-09-07 DIAGNOSIS — E11.65 UNCONTROLLED TYPE 2 DIABETES MELLITUS WITH HYPERGLYCEMIA, WITH LONG-TERM CURRENT USE OF INSULIN (HCC): ICD-10-CM

## 2018-09-11 RX ORDER — INSULIN GLARGINE 100 [IU]/ML
INJECTION, SOLUTION SUBCUTANEOUS
Qty: 15 PEN | Refills: 5 | Status: SHIPPED | OUTPATIENT
Start: 2018-09-11 | End: 2019-03-02 | Stop reason: SDUPTHER

## 2018-09-15 DIAGNOSIS — Z79.4 UNCONTROLLED TYPE 2 DIABETES MELLITUS WITH HYPERGLYCEMIA, WITH LONG-TERM CURRENT USE OF INSULIN (HCC): ICD-10-CM

## 2018-09-15 DIAGNOSIS — E11.65 UNCONTROLLED TYPE 2 DIABETES MELLITUS WITH HYPERGLYCEMIA, WITH LONG-TERM CURRENT USE OF INSULIN (HCC): ICD-10-CM

## 2018-09-19 NOTE — TELEPHONE ENCOUNTER
Outgoing call to patient in regards to appt on 10/8/18.  Patient states that he will be needing refills on medications being that he is out and is not scheduled to be seen until  Monday, November 05, 2018 04:30 PM.     Best call back [phone]    Pharmacy on file verified

## 2018-09-20 RX ORDER — EMPAGLIFLOZIN 25 MG/1
TABLET, FILM COATED ORAL
Qty: 30 TAB | Refills: 5 | Status: SHIPPED | OUTPATIENT
Start: 2018-09-20 | End: 2019-03-25 | Stop reason: SDUPTHER

## 2018-10-07 DIAGNOSIS — E11.65 UNCONTROLLED TYPE 2 DIABETES MELLITUS WITH HYPERGLYCEMIA, WITH LONG-TERM CURRENT USE OF INSULIN (HCC): ICD-10-CM

## 2018-10-07 DIAGNOSIS — Z79.4 UNCONTROLLED TYPE 2 DIABETES MELLITUS WITH HYPERGLYCEMIA, WITH LONG-TERM CURRENT USE OF INSULIN (HCC): ICD-10-CM

## 2018-10-08 RX ORDER — METFORMIN HYDROCHLORIDE 1000 MG/1
TABLET ORAL
Qty: 60 TAB | Refills: 3 | Status: SHIPPED | OUTPATIENT
Start: 2018-10-08 | End: 2019-02-18 | Stop reason: SDUPTHER

## 2018-10-14 DIAGNOSIS — E78.2 MIXED HYPERLIPIDEMIA: ICD-10-CM

## 2018-10-17 RX ORDER — LISINOPRIL 20 MG/1
TABLET ORAL
Qty: 30 TAB | Refills: 3 | Status: SHIPPED | OUTPATIENT
Start: 2018-10-17 | End: 2019-03-02 | Stop reason: SDUPTHER

## 2018-10-17 RX ORDER — ATORVASTATIN CALCIUM 40 MG/1
TABLET, FILM COATED ORAL
Qty: 30 TAB | Refills: 4 | Status: SHIPPED | OUTPATIENT
Start: 2018-10-17 | End: 2019-03-25 | Stop reason: SDUPTHER

## 2018-11-05 ENCOUNTER — OFFICE VISIT (OUTPATIENT)
Dept: FAMILY MEDICINE CLINIC | Age: 53
End: 2018-11-05

## 2018-11-05 VITALS
DIASTOLIC BLOOD PRESSURE: 85 MMHG | WEIGHT: 266 LBS | BODY MASS INDEX: 40.32 KG/M2 | HEIGHT: 68 IN | OXYGEN SATURATION: 97 % | SYSTOLIC BLOOD PRESSURE: 130 MMHG | TEMPERATURE: 97.6 F | HEART RATE: 70 BPM | RESPIRATION RATE: 18 BRPM

## 2018-11-05 DIAGNOSIS — E78.2 MIXED HYPERLIPIDEMIA: ICD-10-CM

## 2018-11-05 DIAGNOSIS — E66.01 CLASS 3 SEVERE OBESITY DUE TO EXCESS CALORIES WITH SERIOUS COMORBIDITY AND BODY MASS INDEX (BMI) OF 40.0 TO 44.9 IN ADULT (HCC): ICD-10-CM

## 2018-11-05 DIAGNOSIS — I10 ESSENTIAL HYPERTENSION, BENIGN: ICD-10-CM

## 2018-11-05 DIAGNOSIS — E11.65 UNCONTROLLED TYPE 2 DIABETES MELLITUS WITH HYPERGLYCEMIA (HCC): Primary | ICD-10-CM

## 2018-11-05 DIAGNOSIS — G24.5 EYE TWITCH: ICD-10-CM

## 2018-11-05 LAB — HBA1C MFR BLD HPLC: 7.7 %

## 2018-11-05 RX ORDER — PIOGLITAZONEHYDROCHLORIDE 30 MG/1
TABLET ORAL
Qty: 30 TAB | Refills: 5 | Status: SHIPPED | OUTPATIENT
Start: 2018-11-05 | End: 2019-03-25 | Stop reason: SDUPTHER

## 2018-11-05 NOTE — PROGRESS NOTES
Chief Complaint Patient presents with  Diabetes 3 month follow up \"REVIEWED RECORD IN PREPARATION FOR VISIT AND HAVE OBTAINED THE NECESSARY DOCUMENTATION\" 1. Have you been to the ER, urgent care clinic since your last visit? Hospitalized since your last visit? No 
 
2. Have you seen or consulted any other health care providers outside of the [address] [place] [address] since your last visit? Include any pap smears or colon screening.  No

## 2018-11-05 NOTE — PATIENT INSTRUCTIONS
Body Mass Index: Care Instructions Your Care Instructions Body mass index (BMI) can help you see if your weight is raising your risk for health problems. It uses a formula to compare how much you weigh with how tall you are. · A BMI lower than 18.5 is considered underweight. · A BMI between 18.5 and 24.9 is considered healthy. · A BMI between 25 and 29.9 is considered overweight. A BMI of 30 or higher is considered obese. If your BMI is in the normal range, it means that you have a lower risk for weight-related health problems. If your BMI is in the overweight or obese range, you may be at increased risk for weight-related health problems, such as high blood pressure, heart disease, stroke, arthritis or joint pain, and diabetes. If your BMI is in the underweight range, you may be at increased risk for health problems such as fatigue, lower protection (immunity) against illness, muscle loss, bone loss, hair loss, and hormone problems. BMI is just one measure of your risk for weight-related health problems. You may be at higher risk for health problems if you are not active, you eat an unhealthy diet, or you drink too much alcohol or use tobacco products. Follow-up care is a key part of your treatment and safety. Be sure to make and go to all appointments, and call your doctor if you are having problems. It's also a good idea to know your test results and keep a list of the medicines you take. How can you care for yourself at home? · Practice healthy eating habits. This includes eating plenty of fruits, vegetables, whole grains, lean protein, and low-fat dairy. · If your doctor recommends it, get more exercise. Walking is a good choice. Bit by bit, increase the amount you walk every day. Try for at least 30 minutes on most days of the week. · Do not smoke. Smoking can increase your risk for health problems.  If you need help quitting, talk to your doctor about stop-smoking programs and medicines. These can increase your chances of quitting for good. · Limit alcohol to 2 drinks a day for men and 1 drink a day for women. Too much alcohol can cause health problems. If you have a BMI higher than 25 · Your doctor may do other tests to check your risk for weight-related health problems. This may include measuring the distance around your waist. A waist measurement of more than 40 inches in men or 35 inches in women can increase the risk of weight-related health problems. · Talk with your doctor about steps you can take to stay healthy or improve your health. You may need to make lifestyle changes to lose weight and stay healthy, such as changing your diet and getting regular exercise. If you have a BMI lower than 18.5 · Your doctor may do other tests to check your risk for health problems. · Talk with your doctor about steps you can take to stay healthy or improve your health. You may need to make lifestyle changes to gain or maintain weight and stay healthy, such as getting more healthy foods in your diet and doing exercises to build muscle. Where can you learn more? Go to http://zak-trista.info/. Enter S176 in the search box to learn more about \"Body Mass Index: Care Instructions. \" Current as of: June 26, 2018 Content Version: 11.8 © 4598-6743 [employer]. Care instructions adapted under license by [employer] (which disclaims liability or warranty for this information). If you have questions about a medical condition or this instruction, always ask your healthcare professional. [address] any warranty or liability for your use of this information.

## 2018-11-05 NOTE — PROGRESS NOTES
HISTORY OF PRESENT ILLNESS Patricia Osborne is a 52 y.o. male who presents today for diabetes 3 month follow up. Blood pressure 130/85, pulse 70, temperature 97.6 °F (36.4 °C), temperature source Oral, resp. rate 18, height 5' 8\" (1.727 m), weight 266 lb (120.7 kg), SpO2 97 %. Body mass index is 40.45 kg/m². Chief Complaint Patient presents with  Diabetes 3 month follow up HPI 
DM2: A1c per POC today 7.7%, dereased from A1c of 7.9% on 05/02/2018. Pt admits to having a poor diet and drinking a lot over the summer. Pt continues with trulicity 4.03 YH/9.1 mL sub-q pen but isn't regular with taking it. Advised to start taking it regularly to improve A1C even more. Pt also continues with Glucophage 1,000 mg BID with meals and actos 30 mg daily. Pt denies HA's, CP, SOB, and dizziness. Twitch: Pt reports having a twitch. He reports not having it when he's away from work for a few days. He wants a referral to a neurologist to see what it could be. Referral sent for Dr. Joce Lang. Pulsatile tinnitus: Pt reports having pulsatile tinnitus intermittent for 4 years with some hearing loss. Tragus sign negative in left ear. Health Maintenance: Labs ordered for future date (~3 months out). Advised pt to get the flu vaccine from work. Current Outpatient Medications Medication Sig Dispense Refill  pioglitazone (ACTOS) 30 mg tablet TAKE ONE TABLET BY MOUTH DAILY 30 Tab 5  
 lisinopril (PRINIVIL, ZESTRIL) 20 mg tablet TAKE ONE TABLET BY MOUTH DAILY 30 Tab 3  
 atorvastatin (LIPITOR) 40 mg tablet TAKE ONE TABLET BY MOUTH DAILY 30 Tab 4  
 metFORMIN (GLUCOPHAGE) 1,000 mg tablet TAKE ONE TABLET BY MOUTH TWICE A DAY WITH MEALS 60 Tab 3  JARDIANCE 25 mg tablet TAKE ONE TABLET BY MOUTH DAILY 30 Tab 5  
 LANTUS SOLOSTAR U-100 INSULIN 100 unit/mL (3 mL) inpn INJECT 60 UNITS UNDER THE SKIN DAILY 15 Pen 5  
 TRULICITY 8.81 RI/9.6 mL sub-q pen INJECT 0.5ML (1 PEN) SUBCUTANEOUSLY ONCE EVERY 7 DAYS 2 Syringe 2  pantoprazole (PROTONIX) 40 mg tablet TAKE ONE TABLET BY MOUTH DAILY 30 Tab 5  pen needle, diabetic (NOVOTWIST) 32 gauge x 1/5\" ndle 100 Each by Does Not Apply route daily. 200 Pen Needle 11  
 glucose blood VI test strips (BLOOD GLUCOSE TEST) strip Accu-Chek Comfort Curv,    Dx code 250.02    Test blood sugar twice daily or as directed by Dr. Ana Yee. 200 Strip 11  Lancets misc Accu-Chek Comfort Curv monitor   Test blood sugar twice a day or as directed by Dr. Ana Yee. 200 Each 11  Blood-Glucose Meter monitoring kit Check glucose daily in the Am  Dx 250.02 1 kit 0  
 tadalafil (CIALIS) 20 mg tablet Take 1 Tab by mouth as needed. 8 Tab 5 Allergies Allergen Reactions  Ppa/Pe/Phenyltolox/Cpm Td Unknown (comments) Allergies:PPA Past Medical History:  
Diagnosis Date  Allergic rhinitis due to other allergen 3/5/2010  Colon polyp  Family history of colon cancer in mother  Hypertension  Mixed hyperlipidemia 3/5/2010  Obesity, unspecified 3/5/2010  PUD (peptic ulcer disease) 15 years ago  Pure hyperglyceridemia 3/5/2010  Type II or unspecified type diabetes mellitus without mention of complication, not stated as uncontrolled 3/5/2010 Past Surgical History:  
Procedure Laterality Date  HX COLONOSCOPY  03/2016 Dr. Kathy Kennedy Family History Problem Relation Age of Onset  Cancer Mother  Heart Disease Father Social History Tobacco Use  Smoking status: Never Smoker  Smokeless tobacco: Never Used Substance Use Topics  Alcohol use: Yes Alcohol/week: 2.0 oz Types: 4 Standard drinks or equivalent per week Review of Systems Constitutional: Negative. Negative for malaise/fatigue. HENT: Positive for hearing loss. Eyes: Negative for blurred vision. Respiratory: Negative for shortness of breath. Cardiovascular: Negative for chest pain and leg swelling. Musculoskeletal: Negative. Neurological: Negative. Negative for dizziness and headaches. All other systems reviewed and are negative. Physical Exam  
Constitutional: He is oriented to person, place, and time. He appears well-developed and well-nourished. HENT:  
Head: Normocephalic and atraumatic. Tragus sign negative in left ear Neck: Carotid bruit is not present. Cardiovascular: Normal rate, regular rhythm, normal heart sounds and intact distal pulses. Exam reveals no gallop and no friction rub. No murmur heard. Pulmonary/Chest: Effort normal and breath sounds normal. No respiratory distress. He has no wheezes. He has no rales. He exhibits no tenderness. Neurological: He is alert and oriented to person, place, and time. Diabetic foot exam:  
 
Left Foot: 
 Visual Exam: normal  
 Pulse DP: 2+ (normal) Filament test: normal sensation Vibratory sensation: normal 
   
Right Foot: 
 Visual Exam: normal  
 Pulse DP: 2+ (normal) Filament test: normal sensation Vibratory sensation: normal 
  
Psychiatric: He has a normal mood and affect. His behavior is normal. Judgment and thought content normal.  
Nursing note and vitals reviewed. ASSESSMENT and PLAN Diagnoses and all orders for this visit: 
 
1. Uncontrolled type 2 diabetes mellitus with hyperglycemia ([facility] [address] -     [facility] POC HEMOGLOBIN A1C Results for orders placed or performed in visit on 11/05/18 [facility] POC HEMOGLOBIN A1C Result Value Ref Range Hemoglobin A1c (POC) 7.7 %  
 
 
 
-      DIABETES FOOT EXAM 
-     Diabetic foot exam was normal. A1C levels have decreased but advised pt to continue to work on diet and exercise as well as limiting alcohol intake. Advised to continue current medication regimen 
 
-     pioglitazone (ACTOS) 30 mg tablet; TAKE ONE TABLET BY MOUTH DAILy 2. Essential hypertension, benign -       Bp stable. Continue current regimen 3. Mixed hyperlipidemia -       Presumed stable. Discussed healthy diet benefits 4. Class 3 severe obesity due to excess calories with serious comorbidity and body mass index (BMI) of 40.0 to 44.9 in adult [facility]) -       I have reviewed/discussed the above normal BMI with the patient. I have recommended the following interventions: dietary management education, guidance, and counseling, encourage exercise and monitor weight . Shawanda Waldron 5. Eye twitch 
-     Suzanne Perches Dr. Silvina Bello. Follow-up Disposition: 
Return in about 3 months (around 2/5/2019) for hypertension, diabetes, cholesterol follow up. Medication risks/benefits/costs/interactions/alternatives discussed with patient. Advised patient to call back or return to office if symptoms worsen/change/persist.  If patient cannot reach us or should anything more severe/urgent arise he/she should proceed directly to the nearest emergency department. Discussed expected course/resolution/complications of diagnosis in detail with patient. Patient given a written after visit summary which includes her diagnoses, current medications and vitals. Patient expressed understanding with the diagnosis and plan. Written by hoa Mitchell, as dictated by Chrissy Del Rio M.D. 
5:48 PM - 6:01 PM 
Total time spent with the patient 13 minutes, greater than 50% of time spent counseling patient.

## 2018-12-01 DIAGNOSIS — E11.65 UNCONTROLLED TYPE 2 DIABETES MELLITUS WITH HYPERGLYCEMIA (HCC): Primary | ICD-10-CM

## 2018-12-07 DIAGNOSIS — K21.9 GASTROESOPHAGEAL REFLUX DISEASE WITHOUT ESOPHAGITIS: ICD-10-CM

## 2018-12-08 RX ORDER — PANTOPRAZOLE SODIUM 40 MG/1
TABLET, DELAYED RELEASE ORAL
Qty: 30 TAB | Refills: 4 | Status: SHIPPED | OUTPATIENT
Start: 2018-12-08 | End: 2019-03-25 | Stop reason: SDUPTHER

## 2019-02-07 ENCOUNTER — TELEPHONE (OUTPATIENT)
Dept: FAMILY MEDICINE CLINIC | Age: 54
End: 2019-02-07

## 2019-02-07 DIAGNOSIS — E29.1 HYPOGONADISM MALE: Primary | ICD-10-CM

## 2019-02-07 NOTE — TELEPHONE ENCOUNTER
Patient is calling stating that the tadalafil (CIALIS) 20 mg tablet does not help at all. Pt would like for something else to be prescribed. Pt is willing to  the script.      Best callback:  [phone]      LOV:  Monday, November 05, 2018  UOV:  Monday, March 25, 2019

## 2019-02-07 NOTE — TELEPHONE ENCOUNTER
[phone] spoke to patient notified needs to get lab work done Patient understand  PEr Dr Shannon Houser ok to order total free testosterone level, fsh,lh

## 2019-02-11 DIAGNOSIS — N52.8 OTHER MALE ERECTILE DYSFUNCTION: Primary | ICD-10-CM

## 2019-02-11 LAB
FSH SERPL-ACNC: 8.8 MIU/ML (ref 1.5–12.4)
LH SERPL-ACNC: 6.6 MIU/ML (ref 1.7–8.6)
TESTOST FREE SERPL-MCNC: 7 PG/ML (ref 7.2–24)
TESTOST SERPL-MCNC: 577 NG/DL (ref 264–916)

## 2019-02-11 RX ORDER — SILDENAFIL 100 MG/1
100 TABLET, FILM COATED ORAL AS NEEDED
Qty: 8 TAB | Refills: 0 | Status: SHIPPED | OUTPATIENT
Start: 2019-02-11 | End: 2019-03-25 | Stop reason: SDUPTHER

## 2019-02-11 RX ORDER — SILDENAFIL 100 MG/1
100 TABLET, FILM COATED ORAL AS NEEDED
Qty: 8 TAB | Refills: 0 | Status: SHIPPED | OUTPATIENT
Start: 2019-02-11 | End: 2019-02-11 | Stop reason: CLARIF

## 2019-02-11 NOTE — TELEPHONE ENCOUNTER
[phone] Spoke to patient Identified pt with two pt identifiers (Name @ ) notified patient of his labs and understand  Patient wants VIagra printed he wants to send it to [facility] cancelled viagra that was sent abhi Grissom understand

## 2019-02-12 RX ORDER — SILDENAFIL 100 MG/1
100 TABLET, FILM COATED ORAL AS NEEDED
Qty: 8 TAB | Refills: 0 | Status: CANCELLED | OUTPATIENT
Start: 2019-02-12

## 2019-02-12 NOTE — TELEPHONE ENCOUNTER
Pt is calling to request a refill for the following. Requested Prescriptions     Pending Prescriptions Disp Refills    sildenafil citrate (VIAGRA) 100 mg tablet 8 Tab 0     Sig: Take 1 Tab by mouth as needed.      LOV 02/08/19

## 2019-02-18 DIAGNOSIS — E11.65 UNCONTROLLED TYPE 2 DIABETES MELLITUS WITH HYPERGLYCEMIA, WITH LONG-TERM CURRENT USE OF INSULIN (HCC): ICD-10-CM

## 2019-02-18 DIAGNOSIS — Z79.4 UNCONTROLLED TYPE 2 DIABETES MELLITUS WITH HYPERGLYCEMIA, WITH LONG-TERM CURRENT USE OF INSULIN (HCC): ICD-10-CM

## 2019-02-18 NOTE — TELEPHONE ENCOUNTER
Pharmacy requesting medication refill     Requested Prescriptions     Pending Prescriptions Disp Refills    metFORMIN (GLUCOPHAGE) 1,000 mg tablet 60 Tab 3     Pharmacy on file verified   Shannan Moyer [phone])    Pt is scheduled to be seen in office on Monday, March 25, 2019 04:00 PM

## 2019-02-19 RX ORDER — METFORMIN HYDROCHLORIDE 1000 MG/1
TABLET ORAL
Qty: 60 TAB | Refills: 5 | Status: SHIPPED | OUTPATIENT
Start: 2019-02-19 | End: 2019-03-25 | Stop reason: SDUPTHER

## 2019-03-25 ENCOUNTER — OFFICE VISIT (OUTPATIENT)
Dept: FAMILY MEDICINE CLINIC | Age: 54
End: 2019-03-25

## 2019-03-25 VITALS
WEIGHT: 262 LBS | SYSTOLIC BLOOD PRESSURE: 122 MMHG | BODY MASS INDEX: 39.71 KG/M2 | DIASTOLIC BLOOD PRESSURE: 87 MMHG | HEIGHT: 68 IN | TEMPERATURE: 98 F | OXYGEN SATURATION: 97 % | HEART RATE: 90 BPM | RESPIRATION RATE: 18 BRPM

## 2019-03-25 DIAGNOSIS — K21.9 GASTROESOPHAGEAL REFLUX DISEASE WITHOUT ESOPHAGITIS: ICD-10-CM

## 2019-03-25 DIAGNOSIS — I10 ESSENTIAL HYPERTENSION, BENIGN: ICD-10-CM

## 2019-03-25 DIAGNOSIS — E11.65 UNCONTROLLED TYPE 2 DIABETES MELLITUS WITH HYPERGLYCEMIA, WITH LONG-TERM CURRENT USE OF INSULIN (HCC): Primary | ICD-10-CM

## 2019-03-25 DIAGNOSIS — E66.01 SEVERE OBESITY (BMI 35.0-39.9) WITH COMORBIDITY (HCC): ICD-10-CM

## 2019-03-25 DIAGNOSIS — E11.65 UNCONTROLLED TYPE 2 DIABETES MELLITUS WITH HYPERGLYCEMIA (HCC): ICD-10-CM

## 2019-03-25 DIAGNOSIS — E78.2 MIXED HYPERLIPIDEMIA: ICD-10-CM

## 2019-03-25 DIAGNOSIS — Z79.4 UNCONTROLLED TYPE 2 DIABETES MELLITUS WITH HYPERGLYCEMIA, WITH LONG-TERM CURRENT USE OF INSULIN (HCC): Primary | ICD-10-CM

## 2019-03-25 LAB — HBA1C MFR BLD HPLC: 8.3 %

## 2019-03-25 RX ORDER — ATORVASTATIN CALCIUM 40 MG/1
TABLET, FILM COATED ORAL
Qty: 30 TAB | Refills: 4 | Status: SHIPPED | OUTPATIENT
Start: 2019-03-25 | End: 2019-09-25 | Stop reason: SDUPTHER

## 2019-03-25 RX ORDER — INSULIN GLARGINE 100 [IU]/ML
INJECTION, SOLUTION SUBCUTANEOUS
Qty: 4 PEN | Refills: 0 | Status: SHIPPED | OUTPATIENT
Start: 2019-03-25 | End: 2019-06-24 | Stop reason: SDUPTHER

## 2019-03-25 RX ORDER — PIOGLITAZONEHYDROCHLORIDE 30 MG/1
TABLET ORAL
Qty: 30 TAB | Refills: 5 | Status: SHIPPED | OUTPATIENT
Start: 2019-03-25 | End: 2019-12-19

## 2019-03-25 RX ORDER — PANTOPRAZOLE SODIUM 40 MG/1
40 TABLET, DELAYED RELEASE ORAL DAILY
Qty: 30 TAB | Refills: 5 | Status: SHIPPED | OUTPATIENT
Start: 2019-03-25 | End: 2019-11-08 | Stop reason: SDUPTHER

## 2019-03-25 RX ORDER — METFORMIN HYDROCHLORIDE 1000 MG/1
TABLET ORAL
Qty: 60 TAB | Refills: 5 | Status: SHIPPED | OUTPATIENT
Start: 2019-03-25 | End: 2020-03-16

## 2019-03-25 RX ORDER — LISINOPRIL 20 MG/1
TABLET ORAL
Qty: 30 TAB | Refills: 0 | Status: SHIPPED | OUTPATIENT
Start: 2019-03-25 | End: 2019-05-12 | Stop reason: SDUPTHER

## 2019-03-25 RX ORDER — SILDENAFIL 100 MG/1
100 TABLET, FILM COATED ORAL AS NEEDED
Qty: 8 TAB | Refills: 11 | Status: SHIPPED | OUTPATIENT
Start: 2019-03-25 | End: 2019-09-08 | Stop reason: SDUPTHER

## 2019-03-25 NOTE — PROGRESS NOTES
FEDERICO Barros 52 y.o. male  presents to the office today for DM and cholesterol follow up. Blood pressure 122/87, pulse 90, temperature 98 °F (36.7 °C), temperature source Oral, resp. rate 18, height 5' 8\" (1.727 m), weight 262 lb (118.8 kg), SpO2 97 %. Body mass index is 39.84 kg/m². Chief Complaint   Patient presents with    Diabetes    Cholesterol Problem      DM2: A1c per POC today 8.3, increased from A1c of 7.7 on 11/5/18. Pt continues with Jardiance 25 mg/d, Lantus 100 unit/mL (60 units/day), Metformin 1000 mg/BID. He has not been adherent to Trulicity and takes every 10-15 days or when he remembers. In addition, he notes he is drinking a lot of sugary and \"tropical\" flavored alcoholic beverages. He notes he will stop drinking mixed drinks and return to drinking straight bourbon. Pt has discussed regimen with Blair Powers PharmD and he will adhere to her orders. Advised pt to adhere to medication regimen more closely. He will also adhere to diabetic diet more closely and to suggestions provided by Blair Powers PharmD (drink diet cranberry juice and avoid sugary drinks). Hyperlipidemia: Lipid panel on 2/19/18 notable for total cholesterol 139, HDL 40, LDL 79, and triglycerides 100. Pt continues with atorvastatin 40 mg/d. Presumed stable, will assess levels today. Hypertension: BP at office today 122/87. Pt continues with lisinopril 20 mg/d. BP is at goal today in office. Advised pt to continue with current medication regimen. Presumed stable, will assess levels today. Obesity: I have reviewed/discussed the above normal BMI with the patient. I have recommended the following interventions: dietary management education, guidance, and counseling, encourage exercise and monitor weight . GERD: Pt will continue Protonix 40 mg/d. He notes it is controlling symptoms well. Continue same regimen.     Current Outpatient Medications   Medication Sig Dispense Refill    lisinopril (PRINIVIL, ZESTRIL) 20 mg tablet TAKE ONE TABLET BY MOUTH DAILY 30 Tab 0    LANTUS SOLOSTAR U-100 INSULIN 100 unit/mL (3 mL) inpn INJECT 60 UNITS UNDER THE SKIN DAILY. 4 Pen 0    metFORMIN (GLUCOPHAGE) 1,000 mg tablet TAKE ONE TABLET BY MOUTH TWICE A DAY WITH MEALS 60 Tab 5    sildenafil citrate (VIAGRA) 100 mg tablet Take 1 Tab by mouth as needed. 8 Tab 0    pantoprazole (PROTONIX) 40 mg tablet TAKE ONE TABLET BY MOUTH DAILY 30 Tab 4    dulaglutide (TRULICITY) 1.5 ND/1.0 mL sub-q pen 0.5 mL by SubCUTAneous route every seven (7) days. 4 Pen 5    pioglitazone (ACTOS) 30 mg tablet TAKE ONE TABLET BY MOUTH DAILY 30 Tab 5    atorvastatin (LIPITOR) 40 mg tablet TAKE ONE TABLET BY MOUTH DAILY 30 Tab 4    JARDIANCE 25 mg tablet TAKE ONE TABLET BY MOUTH DAILY 30 Tab 5    pen needle, diabetic (NOVOTWIST) 32 gauge x 1/5\" ndle 100 Each by Does Not Apply route daily. 200 Pen Needle 11    glucose blood VI test strips (BLOOD GLUCOSE TEST) strip Accu-Chek Comfort Curv,    Dx code 250.02    Test blood sugar twice daily or as directed by Dr. Laura Armendariz. 200 Strip 11    Lancets misc Accu-Chek Comfort Curv monitor   Test blood sugar twice a day or as directed by Dr. Laura Armendariz.  200 Each 11    Blood-Glucose Meter monitoring kit Check glucose daily in the Am  Dx 250.02 1 kit 0     Allergies   Allergen Reactions    Ppa/Pe/Phenyltolox/Cpm Td Unknown (comments)     Allergies:PPA     Past Medical History:   Diagnosis Date    Allergic rhinitis due to other allergen 3/5/2010    Colon polyp     Family history of colon cancer in mother     Hypertension     Mixed hyperlipidemia 3/5/2010    Obesity, unspecified 3/5/2010    PUD (peptic ulcer disease)     15 years ago   [facility] Pure hyperglyceridemia 3/5/2010    Type II or unspecified type diabetes mellitus without mention of complication, not stated as uncontrolled 3/5/2010     Past Surgical History:   Procedure Laterality Date    HX COLONOSCOPY  03/2016    Dr. Carol Chavez     Family History   Problem Relation Age of Onset    Cancer Mother     Heart Disease Father      Social History     Tobacco Use    Smoking status: Never Smoker    Smokeless tobacco: Never Used   Substance Use Topics    Alcohol use: Yes     Alcohol/week: 2.0 oz     Types: 4 Standard drinks or equivalent per week        Review of Systems   Constitutional: Negative for chills and fever. HENT: Negative for hearing loss and tinnitus. Eyes: Negative for blurred vision and double vision. Respiratory: Negative for shortness of breath. Cardiovascular: Negative for chest pain and palpitations. Gastrointestinal: Negative for nausea and vomiting. Genitourinary: Negative for dysuria and frequency. Musculoskeletal: Negative for back pain and falls. Skin: Negative for itching and rash. Neurological: Negative for dizziness, loss of consciousness and headaches. Psychiatric/Behavioral: Negative for depression. The patient is not nervous/anxious. Physical Exam   Constitutional: He is oriented to person, place, and time. He appears well-developed and well-nourished. HENT:   Head: Normocephalic and atraumatic. Right Ear: External ear normal.   Left Ear: External ear normal.   Nose: Nose normal.   Mouth/Throat: Oropharynx is clear and moist.   Eyes: Conjunctivae and EOM are normal.   Neck: Normal range of motion. Neck supple. Cardiovascular: Normal rate, regular rhythm, normal heart sounds and intact distal pulses. Pulmonary/Chest: Effort normal and breath sounds normal.   Abdominal: Soft. Bowel sounds are normal.   Genitourinary: Testes normal.   Musculoskeletal: Normal range of motion. Neurological: He is alert and oriented to person, place, and time. Skin: Skin is warm and dry. Psychiatric: He has a normal mood and affect. His behavior is normal. Judgment and thought content normal.   Nursing note and vitals reviewed. ASSESSMENT and PLAN  Diagnoses and all orders for this visit:    1.  Uncontrolled type 2 diabetes mellitus with hyperglycemia, with long-term current use of insulin (HCC)  Last A1c increased from 7.7 to 8.3 today in office. Advised pt to adhere to Jardiance 25 mg/d, Lantus, Metformin 1048 mg/BID, and Trulicity more closely. He will adhere to diabetic diet more closely and comply with suggestions provided by Mirtha Mustafa PharmD (drink diet cranberry juice and avoid sugary drinks). -     AMB POC HEMOGLOBIN A1C  -     MICROALBUMIN, UR, RAND W/ MICROALB/CREAT RATIO  -     METABOLIC PANEL, COMPREHENSIVE  -     insulin glargine (LANTUS SOLOSTAR U-100 INSULIN) 100 unit/mL (3 mL) inpn; INJECT 60 UNITS UNDER THE SKIN DAILY. -     metFORMIN (GLUCOPHAGE) 1,000 mg tablet; TAKE ONE TABLET BY MOUTH TWICE A DAY WITH MEALS  -     pioglitazone (ACTOS) 30 mg tablet; TAKE ONE TABLET BY MOUTH DAILY  -     empagliflozin (JARDIANCE) 25 mg tablet; TAKE ONE TABLET BY MOUTH DAILY  -     dulaglutide (TRULICITY) 1.5 HB/5.2 mL sub-q pen; 0.5 mL by SubCUTAneous route every seven (7) days. 2. Mixed hyperlipidemia  Presumed stable, will assess levels today. Pt will continue with atorvastatin 40 mg/d; medication refilled today in office.  -     LIPID PANEL  -     METABOLIC PANEL, COMPREHENSIVE  -     lisinopril (PRINIVIL, ZESTRIL) 20 mg tablet; TAKE ONE TABLET BY MOUTH DAILY  -     atorvastatin (LIPITOR) 40 mg tablet; TAKE ONE TABLET BY MOUTH DAILY    3. Essential hypertension, benign  BP is at goal today in office. Advised pt to continue with lisinopril 20 mg/d; medication refilled today in office. 4. Severe obesity (BMI 35.0-39. 9) with comorbidity [facility])  Assessment & Plan:  Worsening, based on history, physical exam and review of pertinent labs, studies and medications; meds reconciled; lifestyle modifications recommended.   Key Obesity Meds             metFORMIN (GLUCOPHAGE) 1,000 mg tablet (Taking) TAKE ONE TABLET BY MOUTH TWICE A DAY WITH MEALS    dulaglutide (TRULICITY) 1.5 IL/3.9 mL sub-q pen (Taking) 0.5 mL by SubCUTAneous route every seven (7) days. Lab Results   Component Value Date/Time    Glucose 130 02/19/2018 10:41 AM    Cholesterol, total 139 02/19/2018 10:41 AM    HDL Cholesterol 40 02/19/2018 10:41 AM    LDL, calculated 79 02/19/2018 10:41 AM    Triglyceride 100 02/19/2018 10:41 AM    Sodium 140 02/19/2018 10:41 AM    Potassium 5.0 02/19/2018 10:41 AM    ALT (SGPT) 18 02/19/2018 10:41 AM    AST (SGOT) 18 02/19/2018 10:41 AM     5. Gastroesophageal reflux disease without esophagitis  Stable and under adequate control with Protonix 40 mg/d. Continue same; medication refilled today in office.   -     pantoprazole (PROTONIX) 40 mg tablet; Take 1 Tab by mouth daily. 6. Uncontrolled type 2 diabetes mellitus with hyperglycemia (HCC)  Last A1c increased from 7.7 to 8.3 today in office. Advised pt to adhere to Jardiance 25 mg/d, Lantus, Metformin 8485 mg/BID, and Trulicity more closely. He will adhere to diabetic diet more closely and comply with suggestions provided by Blair Powers PharmD (drink diet cranberry juice and avoid sugary drinks). Other orders  -     sildenafil citrate (VIAGRA) 100 mg tablet; Take 1 Tab by mouth as needed (ED). Follow-up and Dispositions    · Return in about 3 months (around 6/25/2019) for hypertension, diabetes, cholesterol follow up. Medication risks/benefits/costs/interactions/alternatives discussed with patient. Advised patient to call back or return to office if symptoms worsen/change/persist.  If patient cannot reach us or should anything more severe/urgent arise he/she should proceed directly to the nearest emergency department. Discussed expected course/resolution/complications of diagnosis in detail with patient. Patient given a written after visit summary which includes her diagnoses, current medications and vitals. Patient expressed understanding with the diagnosis and plan.     Written by hoa Bernard, as dictated by Stephanie Jimenez M.D.    5:06 PM - 5:17 PM    Total time spent with the patient 11 minutes, greater than 50% of time spent counseling patient.

## 2019-03-25 NOTE — ASSESSMENT & PLAN NOTE
Worsening, based on history, physical exam and review of pertinent labs, studies and medications; meds reconciled; lifestyle modifications recommended. Key Obesity Meds             metFORMIN (GLUCOPHAGE) 1,000 mg tablet (Taking) TAKE ONE TABLET BY MOUTH TWICE A DAY WITH MEALS    dulaglutide (TRULICITY) 1.5 KH/7.6 mL sub-q pen (Taking) 0.5 mL by SubCUTAneous route every seven (7) days.         Lab Results   Component Value Date/Time    Glucose 130 02/19/2018 10:41 AM    Cholesterol, total 139 02/19/2018 10:41 AM    HDL Cholesterol 40 02/19/2018 10:41 AM    LDL, calculated 79 02/19/2018 10:41 AM    Triglyceride 100 02/19/2018 10:41 AM    Sodium 140 02/19/2018 10:41 AM    Potassium 5.0 02/19/2018 10:41 AM    ALT (SGPT) 18 02/19/2018 10:41 AM    AST (SGOT) 18 02/19/2018 10:41 AM

## 2019-03-25 NOTE — PROGRESS NOTES
Chief Complaint   Patient presents with    Diabetes    Cholesterol Problem       Reviewed Record in preparation for visit and have obtained necessary documentation. Identified pt with two pt identifiers (Name @ )    Health Maintenance Due   Topic    Shingrix Vaccine Age 50> (1 of 2)    EYE EXAM RETINAL OR DILATED     MICROALBUMIN Q1     LIPID PANEL Q1          1. Have you been to the ER, urgent care clinic since your last visit? Hospitalized since your last visit? no    2. Have you seen or consulted any other health care providers outside of the [address] [place] [address] since your last visit? Include any pap smears or colon screening.   No

## 2019-03-25 NOTE — PATIENT INSTRUCTIONS
1)  Start injecting Trulicity on Monday since you said this is an easier day for you to remember to take it. .it will work much better this way    2)  Fruit juices have A LOT of sugar in them, if you have to drink them try the diet cranberry and limit the amount you use    3)  Restart some testing to know where you are at

## 2019-03-25 NOTE — PROGRESS NOTES
Patient seen during PCP appointment for worsening A1C control. Patient is currently taking the following for his diabetes:  Trulicity 1.5 mg weekly  Pioglitazone 30 mg daily  Metformin IR 1000 mg twice daily  Lantus 60 units daily  Jardiance 25 mg daily    Patient attributes his worsening A1C due to drinking \"tropical drinks\" on the weekends. He estimates that per sitting he drinks 50-60 oz of juice (cran-raspberry juice, pineapple juice). He states it started over Christmas when he was in vacation and has continued on. Also drinks Bloody Luisana's on Sundays. He used to just drink bourbon on the rocks. He denies any other changes in his diet. Exercise:  Not currently active but states it will increase with the warmer weather    He has checked blood sugars, but not recently. He denies any adverse effects from his medication and thinks that the jardiance improves his memory. He stiles admit to taking his trulicity every 91-59 days vs weekly due to not remembering it. Discussed with patient how he was consuming a lot of carbohydrates on the weekend as well as calories. Reviewed strategies for reducing the carbs in his drinks by substituting diet cranberry juice, limiting the number of drinks and juice added. Asked patient if he thought taking a daily GLP-1 would be better than weekly in terms of adherence and he stated that he didn't want to take another daily injection. Patient thought that Monday would be a good day of the week to inject and will try to take every Monday. Told him that it will work better when taken on a routine basis. Patient will work on adherence to trulicity and watch his juice intake. Recommended that he restart testing his sugars so he sees the effect that his dietary choices are having on his control    Discussed the above with Dr. Luc Baldwin.     Maricruz Nicole, PharmD, [facility]

## 2019-03-26 LAB
ALBUMIN SERPL-MCNC: 4.4 G/DL (ref 3.5–5.5)
ALBUMIN/CREAT UR: 6 MG/G CREAT (ref 0–30)
ALBUMIN/GLOB SERPL: 1.9 {RATIO} (ref 1.2–2.2)
ALP SERPL-CCNC: 63 IU/L (ref 39–117)
ALT SERPL-CCNC: 18 IU/L (ref 0–44)
AST SERPL-CCNC: 17 IU/L (ref 0–40)
BILIRUB SERPL-MCNC: 0.6 MG/DL (ref 0–1.2)
BUN SERPL-MCNC: 12 MG/DL (ref 6–24)
BUN/CREAT SERPL: 13 (ref 9–20)
CALCIUM SERPL-MCNC: 9.5 MG/DL (ref 8.7–10.2)
CHLORIDE SERPL-SCNC: 104 MMOL/L (ref 96–106)
CHOLEST SERPL-MCNC: 151 MG/DL (ref 100–199)
CO2 SERPL-SCNC: 20 MMOL/L (ref 20–29)
CREAT SERPL-MCNC: 0.92 MG/DL (ref 0.76–1.27)
CREAT UR-MCNC: 53.6 MG/DL
GLOBULIN SER CALC-MCNC: 2.3 G/DL (ref 1.5–4.5)
GLUCOSE SERPL-MCNC: 159 MG/DL (ref 65–99)
HDLC SERPL-MCNC: 46 MG/DL
INTERPRETATION, 910389: NORMAL
LDLC SERPL CALC-MCNC: 83 MG/DL (ref 0–99)
MICROALBUMIN UR-MCNC: 3.2 UG/ML
POTASSIUM SERPL-SCNC: 4.3 MMOL/L (ref 3.5–5.2)
PROT SERPL-MCNC: 6.7 G/DL (ref 6–8.5)
SODIUM SERPL-SCNC: 141 MMOL/L (ref 134–144)
TRIGL SERPL-MCNC: 110 MG/DL (ref 0–149)
VLDLC SERPL CALC-MCNC: 22 MG/DL (ref 5–40)

## 2019-05-24 DIAGNOSIS — Z79.4 UNCONTROLLED TYPE 2 DIABETES MELLITUS WITH HYPERGLYCEMIA, WITH LONG-TERM CURRENT USE OF INSULIN (HCC): ICD-10-CM

## 2019-05-24 DIAGNOSIS — E11.65 UNCONTROLLED TYPE 2 DIABETES MELLITUS WITH HYPERGLYCEMIA, WITH LONG-TERM CURRENT USE OF INSULIN (HCC): ICD-10-CM

## 2019-05-27 RX ORDER — PEN NEEDLE, DIABETIC 30 GX 1/3"
NEEDLE, DISPOSABLE MISCELLANEOUS
Qty: 100 PEN NEEDLE | Refills: 10 | Status: SHIPPED | OUTPATIENT
Start: 2019-05-27

## 2019-06-03 RX ORDER — PEN NEEDLE, DIABETIC 31 GX3/16"
NEEDLE, DISPOSABLE MISCELLANEOUS
COMMUNITY
End: 2019-06-03 | Stop reason: CLARIF

## 2019-06-03 RX ORDER — PEN NEEDLE, DIABETIC 31 GX3/16"
NEEDLE, DISPOSABLE MISCELLANEOUS
COMMUNITY
End: 2019-06-03 | Stop reason: SDUPTHER

## 2019-06-05 RX ORDER — PEN NEEDLE, DIABETIC 31 GX3/16"
NEEDLE, DISPOSABLE MISCELLANEOUS
Qty: 100 PEN NEEDLE | Refills: 5 | Status: SHIPPED | OUTPATIENT
Start: 2019-06-05 | End: 2020-11-17 | Stop reason: SDUPTHER

## 2019-06-24 DIAGNOSIS — E11.65 UNCONTROLLED TYPE 2 DIABETES MELLITUS WITH HYPERGLYCEMIA, WITH LONG-TERM CURRENT USE OF INSULIN (HCC): ICD-10-CM

## 2019-06-24 DIAGNOSIS — Z79.4 UNCONTROLLED TYPE 2 DIABETES MELLITUS WITH HYPERGLYCEMIA, WITH LONG-TERM CURRENT USE OF INSULIN (HCC): ICD-10-CM

## 2019-06-25 RX ORDER — INSULIN GLARGINE 100 [IU]/ML
INJECTION, SOLUTION SUBCUTANEOUS
Qty: 45 ADJUSTABLE DOSE PRE-FILLED PEN SYRINGE | Refills: 0 | Status: SHIPPED | OUTPATIENT
Start: 2019-06-25 | End: 2020-09-14 | Stop reason: SDUPTHER

## 2019-08-28 ENCOUNTER — OFFICE VISIT (OUTPATIENT)
Dept: FAMILY MEDICINE CLINIC | Age: 54
End: 2019-08-28

## 2019-08-28 VITALS
BODY MASS INDEX: 39.56 KG/M2 | WEIGHT: 261 LBS | TEMPERATURE: 97.4 F | HEART RATE: 74 BPM | RESPIRATION RATE: 18 BRPM | OXYGEN SATURATION: 96 % | SYSTOLIC BLOOD PRESSURE: 108 MMHG | HEIGHT: 68 IN | DIASTOLIC BLOOD PRESSURE: 69 MMHG

## 2019-08-28 DIAGNOSIS — E66.01 SEVERE OBESITY (BMI 35.0-39.9) WITH COMORBIDITY (HCC): ICD-10-CM

## 2019-08-28 DIAGNOSIS — E11.65 UNCONTROLLED TYPE 2 DIABETES MELLITUS WITH HYPERGLYCEMIA, WITH LONG-TERM CURRENT USE OF INSULIN (HCC): Primary | ICD-10-CM

## 2019-08-28 DIAGNOSIS — I10 ESSENTIAL HYPERTENSION, BENIGN: ICD-10-CM

## 2019-08-28 DIAGNOSIS — E78.2 MIXED HYPERLIPIDEMIA: ICD-10-CM

## 2019-08-28 DIAGNOSIS — Z79.4 UNCONTROLLED TYPE 2 DIABETES MELLITUS WITH HYPERGLYCEMIA, WITH LONG-TERM CURRENT USE OF INSULIN (HCC): Primary | ICD-10-CM

## 2019-08-28 DIAGNOSIS — Z23 NEED FOR SHINGLES VACCINE: ICD-10-CM

## 2019-08-28 LAB — HBA1C MFR BLD HPLC: 8.3 %

## 2019-08-28 RX ORDER — PHENTERMINE HYDROCHLORIDE 37.5 MG/1
37.5 TABLET ORAL
Qty: 90 TAB | Refills: 0 | Status: SHIPPED | OUTPATIENT
Start: 2019-08-28 | End: 2021-03-16 | Stop reason: SDUPTHER

## 2019-08-28 NOTE — PATIENT INSTRUCTIONS
Counting Carbohydrates: Care Instructions  Your Care Instructions    You don't have to eat special foods when you have diabetes. You just have to be careful to eat healthy foods. Carbohydrates (carbs) raise blood sugar higher and quicker than any other nutrient. Carbs are found in desserts, breads and cereals, and fruit. They're also in starchy vegetables. These include potatoes, corn, and grains such as rice and pasta. Carbs are also in milk and yogurt. The more carbs you eat at one time, the higher your blood sugar will rise. Spreading carbs all through the day helps keep your blood sugar levels within your target range. Counting carbs is one of the best ways to keep your blood sugar under control. If you use insulin, counting carbs helps you match the right amount of insulin to the number of grams of carbs in a meal. Then you can change your diet and insulin dose as needed. Testing your blood sugar several times a day can help you learn how carbs affect your blood sugar. A registered dietitian or certified diabetes educator can help you plan meals and snacks. Follow-up care is a key part of your treatment and safety. Be sure to make and go to all appointments, and call your doctor if you are having problems. It's also a good idea to know your test results and keep a list of the medicines you take. How can you care for yourself at home? Know your daily amount of carbohydrates  Your daily amount depends on several things, such as your weight, how active you are, which diabetes medicines you take, and what your goals are for your blood sugar levels. A registered dietitian or certified diabetes educator can help you plan how many carbs to include in each meal and snack. For most adults, a guideline for the daily amount of carbs is:  · 45 to 60 grams at each meal. That's about the same as 3 to 4 carbohydrate servings. · 15 to 20 grams at each snack. That's about the same as 1 carbohydrate serving.   Count carbs  Counting carbs lets you know how much rapid-acting insulin to take before you eat. If you use an insulin pump, you get a constant rate of insulin during the day. So the pump must be programmed at meals. This gives you extra insulin to cover the rise in blood sugar after meals. If you take insulin:  · Learn your own insulin-to-carb ratio. You and your diabetes health professional will figure out the ratio. You can do this by testing your blood sugar after meals. For example, you may need a certain amount of insulin for every 15 grams of carbs. · Add up the carb grams in a meal. Then you can figure out how many units of insulin to take based on your insulin-to-carb ratio. · Exercise lowers blood sugar. You can use less insulin than you would if you were not doing exercise. Keep in mind that timing matters. If you exercise within 1 hour after a meal, your body may need less insulin for that meal than it would if you exercised 3 hours after the meal. Test your blood sugar to find out how exercise affects your need for insulin. If you do or don't take insulin:  · Look at labels on packaged foods. This can tell you how many carbs are in a serving. You can also use guides from the American Diabetes Association. · Be aware of portions, or serving sizes. If a package has two servings and you eat the whole package, you need to double the number of grams of carbohydrate listed for one serving. · Protein, fat, and fiber do not raise blood sugar as much as carbs do. If you eat a lot of these nutrients in a meal, your blood sugar will rise more slowly than it would otherwise. Eat from all food groups  · Eat at least three meals a day. · Plan meals to include food from all the food groups. The food groups include grains, fruits, dairy, proteins, and vegetables. · Talk to your dietitian or diabetes educator about ways to add limited amounts of sweets into your meal plan. · If you drink alcohol, talk to your doctor. It may not be recommended when you are taking certain diabetes medicines. Where can you learn more? Go to http://zak-trista.info/. Enter M249 in the search box to learn more about \"Counting Carbohydrates: Care Instructions. \"  Current as of: July 25, 2018  Content Version: 12.1  © 1986-5238 [employer]. Care instructions adapted under license by [employer] (which disclaims liability or warranty for this information). If you have questions about a medical condition or this instruction, always ask your healthcare professional. [address] any warranty or liability for your use of this information.

## 2019-08-28 NOTE — PROGRESS NOTES
Chief Complaint   Patient presents with    Diabetes    Hypertension    Cholesterol Problem       Reviewed Record in preparation for visit and have obtained necessary documentation. Identified pt with two pt identifiers (Name @ )    Health Maintenance Due   Topic    Shingrix Vaccine Age 50> (1 of 2)    EYE EXAM RETINAL OR DILATED     Influenza Age 5 to Adult     HEMOGLOBIN A1C Q6M          1. Have you been to the ER, urgent care clinic since your last visit? Hospitalized since your last visit? No      2. Have you seen or consulted any other health care providers outside of the [address] [place] [address] since your last visit? Include any pap smears or colon screening.  no

## 2019-08-28 NOTE — PROGRESS NOTES
History of Present Illness  Reginaldo Valentin is a 52 y.o. male who presents to the office today for follow up of routine medical issues. DM2: A1c per POC today 8.3, unchanged from A1c of 8.3 on 03/25/2019. Pt continues with trulicity, jardiance 81CL, actos 30mg, metformin 1000mg, lantus. Pt reports that he has decreased his intake of sugary drinks, but otherwise struggles with his diet. Obesity: Pt's weight has increased from 251lbs in 12/2017 to 261lbs today. He reports that he had good results with phentermine in the past as he feels that his appetite is the issue. He notes that his stress has decreased as he is now working from home. Hypertension: BP at office today was 108/69. Pt continues with lisinopril 20mg. Hyperlipidemia: Lipid panel on 03/25/2019 notable for total cholesterol 151, HDL 46, LDL 83, and triglycerides 110. Routine health maintenance: Pt declines shingrix. He is due for an eye exam.      /69 (BP 1 Location: Right arm, BP Patient Position: Sitting)   Pulse 74   Temp 97.4 °F (36.3 °C) (Oral)   Resp 18   Ht 5' 8\" (1.727 m)   Wt 261 lb (118.4 kg)   SpO2 96%   BMI 39.68 kg/m²     Current Outpatient Medications   Medication Sig Dispense Refill    varicella-zoster recombinant, PF, (SHINGRIX, PF,) 50 mcg/0.5 mL susr injection 0.5 mL by IntraMUSCular route once.  phentermine (ADIPEX-P) 37.5 mg tablet Take 1 Tab by mouth every morning. Max Daily Amount: 37.5 mg. 90 Tab 0    insulin glargine (LANTUS SOLOSTAR U-100 INSULIN) 100 unit/mL (3 mL) inpn INJECT 60 UNITS UNDER THE SKIN DAILY. 45 Adjustable Dose Pre-filled Pen Syringe 0    Insulin Needles, Disposable, (OUSMANE PEN NEEDLE) 32 gauge x 5/32\" ndle USE ONCE DAILY.  Please dispense whatever insurance covers 100 Pen Needle 5    NOVOTWIST 32 gauge x 1/5\" ndle USE ONCE DAILY 100 Pen Needle 10    lisinopril (PRINIVIL, ZESTRIL) 20 mg tablet TAKE ONE TABLET BY MOUTH DAILY 30 Tab 5    metFORMIN (GLUCOPHAGE) 1,000 mg tablet TAKE ONE TABLET BY MOUTH TWICE A DAY WITH MEALS 60 Tab 5    sildenafil citrate (VIAGRA) 100 mg tablet Take 1 Tab by mouth as needed (ED). 8 Tab 11    pantoprazole (PROTONIX) 40 mg tablet Take 1 Tab by mouth daily. 30 Tab 5    pioglitazone (ACTOS) 30 mg tablet TAKE ONE TABLET BY MOUTH DAILY 30 Tab 5    atorvastatin (LIPITOR) 40 mg tablet TAKE ONE TABLET BY MOUTH DAILY 30 Tab 4    empagliflozin (JARDIANCE) 25 mg tablet TAKE ONE TABLET BY MOUTH DAILY 30 Tab 5    dulaglutide (TRULICITY) 1.5 XM/0.3 mL sub-q pen 0.5 mL by SubCUTAneous route every seven (7) days. 4 Pen 5    glucose blood VI test strips (BLOOD GLUCOSE TEST) strip Accu-Chek Comfort Curv,    Dx code 250.02    Test blood sugar twice daily or as directed by Dr. Anne-Marie House. 200 Strip 11    Lancets misc Accu-Chek Comfort Curv monitor   Test blood sugar twice a day or as directed by Dr. Anne-Marie House. 200 Each 11    Blood-Glucose Meter monitoring kit Check glucose daily in the Am  Dx 250.02 1 kit 0     Allergies   Allergen Reactions    Ppa/Pe/Phenyltolox/Cpm Td Unknown (comments)     Allergies:PPA     Past Medical History:   Diagnosis Date    Allergic rhinitis due to other allergen 3/5/2010    Colon polyp     Family history of colon cancer in mother     Hypertension     Mixed hyperlipidemia 3/5/2010    Obesity, unspecified 3/5/2010    PUD (peptic ulcer disease)     15 years ago   Jermain.Bitter Pure hyperglyceridemia 3/5/2010    Type II or unspecified type diabetes mellitus without mention of complication, not stated as uncontrolled 3/5/2010     Past Surgical History:   Procedure Laterality Date    HX COLONOSCOPY  03/2016    Dr. Pittman No     Family History   Problem Relation Age of Onset    Cancer Mother     Heart Disease Father      Social History     Tobacco Use    Smoking status: Never Smoker    Smokeless tobacco: Never Used   Substance Use Topics    Alcohol use:  Yes     Alcohol/week: 3.3 standard drinks     Types: 4 Standard drinks or equivalent per week Review of Systems   Constitutional: Negative for malaise/fatigue. Eyes: Negative for blurred vision. Respiratory: Negative for shortness of breath. Cardiovascular: Negative for chest pain. Genitourinary: Negative for frequency. Neurological: Negative for dizziness, tingling, sensory change and headaches. Endo/Heme/Allergies: Negative for polydipsia. All other systems reviewed and are negative. Physical Exam   Constitutional: He is oriented to person, place, and time and well-developed, well-nourished, and in no distress. HENT:   Head: Normocephalic and atraumatic. Eyes: EOM are normal.   Neck: Neck supple. No thyromegaly present. Cardiovascular: Normal rate, regular rhythm, normal heart sounds and intact distal pulses. Exam reveals no gallop. No murmur heard. Pulmonary/Chest: Effort normal and breath sounds normal. No respiratory distress. He has no wheezes. He has no rales. He exhibits no tenderness. Musculoskeletal: He exhibits no edema. Neurological: He is alert and oriented to person, place, and time. Skin: He is not diaphoretic. Psychiatric: Mood, memory, affect and judgment normal.   Nursing note and vitals reviewed. Diagnoses and all orders for this visit:    1. Uncontrolled type 2 diabetes mellitus with hyperglycemia, with long-term current use of insulin ([facility])  -     AMB POC HEMOGLOBIN A1C    2. Need for shingles vaccine    3. Severe obesity (BMI 35.0-39. 9) with comorbidity ([facility])  -     phentermine (ADIPEX-P) 37.5 mg tablet; Take 1 Tab by mouth every morning. Max Daily Amount: 37.5 mg.    4. Essential hypertension, benign    5. Mixed hyperlipidemia    1. Uncontrolled DM2  Hgb A1c unchanged from previous. Continue current medications. Strongly advised pt to avoid starches, sugars, and alcohols and increase exercise. F/u in 3 mos. 2. Need for Shingles vaccine  Offered Shingrix, pt declined.     3. Obesity  I have reviewed/discussed the above normal BMI with the patient. I have recommended the following interventions: dietary management education, guidance, and counseling, encourage exercise and monitor weight. Prescribed phentermine. F/u in 3 mos. 4. Hypertension  BP seems to be well controlled. I recommended continuing current dose of lisinopril, eating a low sodium diet, and increasing exercise. Recheck CMP. 5. HLD  Recheck CMP and lipid panel. Medication risks/benefits/costs/interactions/alternatives discussed with patient. Advised patient to call back or return to office if symptoms worsen/change/persist.  If patient cannot reach us or should anything more severe/urgent arise he should proceed directly to the nearest emergency department. Discussed expected course/resolution/complications of diagnosis in detail with patient. Patient given a written after visit summary which includes his diagnoses, current medications and vitals. Patient expressed understanding with the diagnosis and plan. Written by Gillian Sorto, as dictated by Jaycee Bailey M.D.     4:01 PM - 4:11 PM     Total time spent with the patient was 10 minutes, greater than 50% of time spent counseling patient.

## 2019-09-09 RX ORDER — SILDENAFIL 100 MG/1
TABLET, FILM COATED ORAL
Qty: 8 TAB | Refills: 0 | Status: SHIPPED | OUTPATIENT
Start: 2019-09-09 | End: 2021-08-02

## 2019-09-25 DIAGNOSIS — E78.2 MIXED HYPERLIPIDEMIA: ICD-10-CM

## 2019-09-25 DIAGNOSIS — Z79.4 UNCONTROLLED TYPE 2 DIABETES MELLITUS WITH HYPERGLYCEMIA, WITH LONG-TERM CURRENT USE OF INSULIN (HCC): ICD-10-CM

## 2019-09-25 DIAGNOSIS — E11.65 UNCONTROLLED TYPE 2 DIABETES MELLITUS WITH HYPERGLYCEMIA, WITH LONG-TERM CURRENT USE OF INSULIN (HCC): ICD-10-CM

## 2019-09-25 RX ORDER — ATORVASTATIN CALCIUM 40 MG/1
TABLET, FILM COATED ORAL
Qty: 30 TAB | Refills: 3 | Status: SHIPPED | OUTPATIENT
Start: 2019-09-25 | End: 2020-02-18 | Stop reason: SDUPTHER

## 2019-09-25 RX ORDER — INSULIN GLARGINE 100 [IU]/ML
INJECTION, SOLUTION SUBCUTANEOUS
Qty: 45 ADJUSTABLE DOSE PRE-FILLED PEN SYRINGE | Refills: 2 | Status: SHIPPED | OUTPATIENT
Start: 2019-09-25 | End: 2020-06-18

## 2019-09-28 DIAGNOSIS — E11.65 UNCONTROLLED TYPE 2 DIABETES MELLITUS WITH HYPERGLYCEMIA, WITH LONG-TERM CURRENT USE OF INSULIN (HCC): ICD-10-CM

## 2019-09-28 DIAGNOSIS — Z79.4 UNCONTROLLED TYPE 2 DIABETES MELLITUS WITH HYPERGLYCEMIA, WITH LONG-TERM CURRENT USE OF INSULIN (HCC): ICD-10-CM

## 2019-11-08 DIAGNOSIS — K21.9 GASTROESOPHAGEAL REFLUX DISEASE WITHOUT ESOPHAGITIS: ICD-10-CM

## 2019-11-08 RX ORDER — PANTOPRAZOLE SODIUM 40 MG/1
TABLET, DELAYED RELEASE ORAL
Qty: 30 TAB | Refills: 3 | Status: SHIPPED | OUTPATIENT
Start: 2019-11-08 | End: 2020-03-16

## 2019-11-26 ENCOUNTER — TELEPHONE (OUTPATIENT)
Dept: FAMILY MEDICINE CLINIC | Age: 54
End: 2019-11-26

## 2019-11-26 NOTE — TELEPHONE ENCOUNTER
[phone] spoke to patient notified we can get his labs 1 week prior or he can do labs the day of appointment per patient he lives 1 1/2 hour away and he wants to do labs the day of his appointment patient will get his labs after OV with Dr Kriss Franklin

## 2019-11-26 NOTE — TELEPHONE ENCOUNTER
----- Message from Owen Waddell sent at 11/26/2019 11:49 AM EST -----  Regarding: Dr. Kriss Franklin / Amanda Mena first and last name: Ankitlnua Leary      Reason for call labs forms      Callback required yes/no and why: no      Best contact number(s): [phone]      Details to clarify the request: Pt requested lab forms prior to appt on 1/29      Owen Waddell

## 2019-11-30 DIAGNOSIS — E78.2 MIXED HYPERLIPIDEMIA: ICD-10-CM

## 2019-12-02 RX ORDER — LISINOPRIL 20 MG/1
TABLET ORAL
Qty: 30 TAB | Refills: 4 | Status: SHIPPED | OUTPATIENT
Start: 2019-12-02 | End: 2020-05-19

## 2019-12-18 DIAGNOSIS — E11.65 UNCONTROLLED TYPE 2 DIABETES MELLITUS WITH HYPERGLYCEMIA, WITH LONG-TERM CURRENT USE OF INSULIN (HCC): ICD-10-CM

## 2019-12-18 DIAGNOSIS — Z79.4 UNCONTROLLED TYPE 2 DIABETES MELLITUS WITH HYPERGLYCEMIA, WITH LONG-TERM CURRENT USE OF INSULIN (HCC): ICD-10-CM

## 2019-12-19 RX ORDER — PIOGLITAZONEHYDROCHLORIDE 30 MG/1
TABLET ORAL
Qty: 30 TAB | Refills: 5 | Status: SHIPPED | OUTPATIENT
Start: 2019-12-19 | End: 2020-09-14 | Stop reason: SDUPTHER

## 2020-01-29 ENCOUNTER — OFFICE VISIT (OUTPATIENT)
Dept: FAMILY MEDICINE CLINIC | Age: 55
End: 2020-01-29

## 2020-01-29 VITALS
OXYGEN SATURATION: 96 % | RESPIRATION RATE: 16 BRPM | SYSTOLIC BLOOD PRESSURE: 120 MMHG | WEIGHT: 263 LBS | BODY MASS INDEX: 39.86 KG/M2 | HEIGHT: 68 IN | DIASTOLIC BLOOD PRESSURE: 80 MMHG | HEART RATE: 67 BPM | TEMPERATURE: 98.4 F

## 2020-01-29 DIAGNOSIS — E78.2 MIXED HYPERLIPIDEMIA: ICD-10-CM

## 2020-01-29 DIAGNOSIS — I10 ESSENTIAL HYPERTENSION, BENIGN: ICD-10-CM

## 2020-01-29 DIAGNOSIS — E11.65 UNCONTROLLED TYPE 2 DIABETES MELLITUS WITH HYPERGLYCEMIA, WITH LONG-TERM CURRENT USE OF INSULIN (HCC): Primary | ICD-10-CM

## 2020-01-29 DIAGNOSIS — Z79.4 UNCONTROLLED TYPE 2 DIABETES MELLITUS WITH HYPERGLYCEMIA, WITH LONG-TERM CURRENT USE OF INSULIN (HCC): Primary | ICD-10-CM

## 2020-01-29 DIAGNOSIS — E66.01 SEVERE OBESITY (BMI 35.0-39.9) WITH COMORBIDITY (HCC): ICD-10-CM

## 2020-01-29 LAB — HBA1C MFR BLD HPLC: 8.9 %

## 2020-01-29 NOTE — PROGRESS NOTES
FEDERICO Titus 62 y.o. male  presents to the office today for follow up on chronic conditions. Blood pressure 120/80, pulse 67, temperature 98.4 °F (36.9 °C), temperature source Oral, resp. rate 16, height 5' 8\" (1.727 m), weight 263 lb (119.3 kg), SpO2 96 %. Body mass index is 39.99 kg/m². Chief Complaint   Patient presents with    Diabetes     follow up , fasting     Hypertension    Cholesterol Problem     follow up, fasting       DM2: A1c per POC today 8.9, increased from A1c of 8.3 on 8/28/19. Pt is currently on Actos 30 mg/d, Jardiance 25 mg/d, Metformin 5,992 mg/BID, Trulicity injection and insulin injection. But pt admits that he has not been doing Trulicity as he is supposed to; doing once every 3 weeks instead of once a week. Pt states that he checks his BG levels \"every now and then\", and readings usually range between 120-160. Pt endorses that he walks at least ~2 miles about 4 times per week. Pt states that he was expecting his A1c to be around 8.1 today. Hypertension: BP at office today 120/80. Pt continues with Lisinopril 20 mg/d. Hyperlipidemia: Lipid panel on 3/25/19 notable for total cholesterol 151, HDL 46, LDL 83, and triglycerides 110. Pt continues with Atorvastatin 40 mg/d. Obesity: I have reviewed/discussed the above normal BMI with the patient. Health Maintenance: Pt already received flu shot this season in Nov. Pt is due for eye exam and he will need to make ovidio for that. Pt states that he already received first dose of Shingrix vaccine in Nov which was offered at work; will get second dose next week.      Current Outpatient Medications   Medication Sig Dispense Refill    pioglitazone (ACTOS) 30 mg tablet TAKE ONE TABLET BY MOUTH DAILY 30 Tab 5    lisinopril (PRINIVIL, ZESTRIL) 20 mg tablet TAKE ONE TABLET BY MOUTH DAILY 30 Tab 4    pantoprazole (PROTONIX) 40 mg tablet TAKE ONE TABLET BY MOUTH DAILY 30 Tab 3    empagliflozin (JARDIANCE) 25 mg tablet TAKE ONE TABLET BY MOUTH DAILY 30 Tab 4    insulin glargine (LANTUS SOLOSTAR U-100 INSULIN) 100 unit/mL (3 mL) inpn INJECT 60 UNITS UNDER THE SKIN DAILY. 45 Adjustable Dose Pre-filled Pen Syringe 2    atorvastatin (LIPITOR) 40 mg tablet TAKE ONE TABLET BY MOUTH DAILY 30 Tab 3    sildenafil citrate (VIAGRA) 100 mg tablet TAKE 1 TABLET BY MOUTH AS NEEDED 30 MINS PRIOR TO INTIMACY. 8 Tab 0    varicella-zoster recombinant, PF, (SHINGRIX, PF,) 50 mcg/0.5 mL susr injection 0.5 mL by IntraMUSCular route once.  phentermine (ADIPEX-P) 37.5 mg tablet Take 1 Tab by mouth every morning. Max Daily Amount: 37.5 mg. 90 Tab 0    insulin glargine (LANTUS SOLOSTAR U-100 INSULIN) 100 unit/mL (3 mL) inpn INJECT 60 UNITS UNDER THE SKIN DAILY. 45 Adjustable Dose Pre-filled Pen Syringe 0    Insulin Needles, Disposable, (OUSMANE PEN NEEDLE) 32 gauge x 5/32\" ndle USE ONCE DAILY. Please dispense whatever insurance covers 100 Pen Needle 5    NOVOTWIST 32 gauge x 1/5\" ndle USE ONCE DAILY 100 Pen Needle 10    metFORMIN (GLUCOPHAGE) 1,000 mg tablet TAKE ONE TABLET BY MOUTH TWICE A DAY WITH MEALS 60 Tab 5    dulaglutide (TRULICITY) 1.5 XJ/6.5 mL sub-q pen 0.5 mL by SubCUTAneous route every seven (7) days. 4 Pen 5    glucose blood VI test strips (BLOOD GLUCOSE TEST) strip Accu-Chek Comfort Curv,    Dx code 250.02    Test blood sugar twice daily or as directed by Dr. Eugene Barbour. 200 Strip 11    Lancets misc Accu-Chek Comfort Curv monitor   Test blood sugar twice a day or as directed by Dr. Eugene Barbour.  200 Each 11    Blood-Glucose Meter monitoring kit Check glucose daily in the Am  Dx 250.02 1 kit 0     Allergies   Allergen Reactions    Ppa/Pe/Phenyltolox/Cpm Td Unknown (comments)     Allergies:PPA     Past Medical History:   Diagnosis Date    Allergic rhinitis due to other allergen 3/5/2010    Colon polyp     Family history of colon cancer in mother     Hypertension     Mixed hyperlipidemia 3/5/2010    Obesity, unspecified 3/5/2010    PUD (peptic ulcer disease)     15 years ago   [de-identified] hyperglyceridemia 3/5/2010    Type II or unspecified type diabetes mellitus without mention of complication, not stated as uncontrolled 3/5/2010     Past Surgical History:   Procedure Laterality Date    HX COLONOSCOPY  03/2016    Dr. Sylvester Madison     Family History   Problem Relation Age of Onset    Cancer Mother     Heart Disease Father      Social History     Tobacco Use    Smoking status: Never Smoker    Smokeless tobacco: Never Used   Substance Use Topics    Alcohol use: Yes     Alcohol/week: 3.3 standard drinks     Types: 4 Standard drinks or equivalent per week        Review of Systems   Constitutional: Negative for chills and fever. HENT: Negative for hearing loss and tinnitus. Eyes: Negative for blurred vision and double vision. Respiratory: Negative for cough and shortness of breath. Cardiovascular: Negative for chest pain and palpitations. Gastrointestinal: Negative for nausea and vomiting. Genitourinary: Negative for dysuria and frequency. Musculoskeletal: Negative for back pain and falls. Skin: Negative for itching and rash. Neurological: Negative for dizziness, loss of consciousness and headaches. Psychiatric/Behavioral: Negative for depression. The patient is not nervous/anxious. Physical Exam  Vitals signs and nursing note reviewed. Constitutional:       Appearance: Normal appearance. He is well-developed. HENT:      Head: Normocephalic and atraumatic. Right Ear: External ear normal.      Left Ear: External ear normal.      Nose: Nose normal.   Eyes:      Conjunctiva/sclera: Conjunctivae normal.      Pupils: Pupils are equal, round, and reactive to light. Neck:      Musculoskeletal: Normal range of motion and neck supple. Cardiovascular:      Rate and Rhythm: Normal rate and regular rhythm. Pulses: Normal pulses. Heart sounds: Normal heart sounds.    Pulmonary:      Effort: Pulmonary effort is normal. Breath sounds: Normal breath sounds. Abdominal:      General: Bowel sounds are normal.      Palpations: Abdomen is soft. Musculoskeletal: Normal range of motion. Skin:     General: Skin is warm and dry. Neurological:      Mental Status: He is alert and oriented to person, place, and time. Psychiatric:         Speech: Speech normal.         Behavior: Behavior normal.         Thought Content: Thought content normal.         Judgment: Judgment normal.       Diabetic foot exam:     Left Foot:   Visual Exam: normal    Pulse DP: 2+ (normal)   Filament test: normal sensation       Right Foot:   Visual Exam: normal    Pulse DP: 2+ (normal)   Filament test: normal sensation     ASSESSMENT and PLAN  Diagnoses and all orders for this visit:    1. Uncontrolled type 2 diabetes mellitus with hyperglycemia, with long-term current use of insulin ([facility])  A1c today 8.9; uncontrolled. Pt continues with Actos 30 mg/d, Jardiance 25 mg/d, Metformin 1,873 mg/BID, Trulicity injection and insulin injection. Discussed with pt the importance of staying compliant with his medication regimen. Advised pt that he would benefit from speaking with our PharmD.  -      DIABETES FOOT EXAM  -     METABOLIC PANEL, COMPREHENSIVE  -     MICROALBUMIN, UR, RAND W/ MICROALB/CREAT RATIO    2. Mixed hyperlipidemia  Presumed stable, will assess levels today. Pt continues with Atorvastatin 40 mg/d. -     METABOLIC PANEL, COMPREHENSIVE  -     LIPID PANEL    3. Essential hypertension, benign  BP is at goal today in office. Advised pt to continue with Lisinopril 20 mg/d. -     METABOLIC PANEL, COMPREHENSIVE    4. Severe obesity (BMI 35.0-39. 9) with comorbidity ([address]  I have reviewed/discussed the above normal BMI with the patient. I have recommended the following interventions: dietary management education, guidance, and counseling, encourage exercise and monitor weight .      Follow-up and Dispositions    · Return in about 3 months (around 4/29/2020) for hypertension, diabetes, cholesterol follow up. Medication risks/benefits/costs/interactions/alternatives discussed with patient. Advised patient to call back or return to office if symptoms worsen/change/persist.  If patient cannot reach us or should anything more severe/urgent arise he/she should proceed directly to the nearest emergency department. Discussed expected course/resolution/complications of diagnosis in detail with patient. Patient given a written after visit summary which includes diagnoses, current medications and vitals. Patient expressed understanding with the diagnosis and plan. Written by hoa Navarro, as dictated by Maria R Webster M.D.    8:44 AM - 8:55 AM    Total time spent with the patient 11 minutes, greater than 50% of time spent counseling patient.

## 2020-01-29 NOTE — PATIENT INSTRUCTIONS
Counting Carbohydrates: Care Instructions Your Care Instructions You don't have to eat special foods when you have diabetes. You just have to be careful to eat healthy foods. Carbohydrates (carbs) raise blood sugar higher and quicker than any other nutrient. Carbs are found in desserts, breads and cereals, and fruit. They're also in starchy vegetables. These include potatoes, corn, and grains such as rice and pasta. Carbs are also in milk and yogurt. The more carbs you eat at one time, the higher your blood sugar will rise. Spreading carbs all through the day helps keep your blood sugar levels within your target range. Counting carbs is one of the best ways to keep your blood sugar under control. If you use insulin, counting carbs helps you match the right amount of insulin to the number of grams of carbs in a meal. Then you can change your diet and insulin dose as needed. Testing your blood sugar several times a day can help you learn how carbs affect your blood sugar. A registered dietitian or certified diabetes educator can help you plan meals and snacks. Follow-up care is a key part of your treatment and safety. Be sure to make and go to all appointments, and call your doctor if you are having problems. It's also a good idea to know your test results and keep a list of the medicines you take. How can you care for yourself at home? Know your daily amount of carbohydrates Your daily amount depends on several things, such as your weight, how active you are, which diabetes medicines you take, and what your goals are for your blood sugar levels. A registered dietitian or certified diabetes educator can help you plan how many carbs to include in each meal and snack. For most adults, a guideline for the daily amount of carbs is: · 45 to 60 grams at each meal. That's about the same as 3 to 4 carbohydrate servings. · 15 to 20 grams at each snack. That's about the same as 1 carbohydrate serving. Count carbs Counting carbs lets you know how much rapid-acting insulin to take before you eat. If you use an insulin pump, you get a constant rate of insulin during the day. So the pump must be programmed at meals. This gives you extra insulin to cover the rise in blood sugar after meals. If you take insulin: 
· Learn your own insulin-to-carb ratio. You and your diabetes health professional will figure out the ratio. You can do this by testing your blood sugar after meals. For example, you may need a certain amount of insulin for every 15 grams of carbs. · Add up the carb grams in a meal. Then you can figure out how many units of insulin to take based on your insulin-to-carb ratio. · Exercise lowers blood sugar. You can use less insulin than you would if you were not doing exercise. Keep in mind that timing matters. If you exercise within 1 hour after a meal, your body may need less insulin for that meal than it would if you exercised 3 hours after the meal. Test your blood sugar to find out how exercise affects your need for insulin. If you do or don't take insulin: 
· Look at labels on packaged foods. This can tell you how many carbs are in a serving. You can also use guides from the American Diabetes Association. · Be aware of portions, or serving sizes. If a package has two servings and you eat the whole package, you need to double the number of grams of carbohydrate listed for one serving. · Protein, fat, and fiber do not raise blood sugar as much as carbs do. If you eat a lot of these nutrients in a meal, your blood sugar will rise more slowly than it would otherwise. Eat from all food groups · Eat at least three meals a day. · Plan meals to include food from all the food groups. The food groups include grains, fruits, dairy, proteins, and vegetables. · Talk to your dietitian or diabetes educator about ways to add limited amounts of sweets into your meal plan. · If you drink alcohol, talk to your doctor. It may not be recommended when you are taking certain diabetes medicines. Where can you learn more? Go to http://zak-trista.info/. Enter J789 in the search box to learn more about \"Counting Carbohydrates: Care Instructions. \" Current as of: April 16, 2019 Content Version: 12.2 © 5899-7825 [employer]. Care instructions adapted under license by [employer] (which disclaims liability or warranty for this information). If you have questions about a medical condition or this instruction, always ask your healthcare professional. [place] [address] any warranty or liability for your use of this information.

## 2020-01-30 LAB
ALBUMIN SERPL-MCNC: 4.5 G/DL (ref 3.8–4.9)
ALBUMIN/CREAT UR: 5 MG/G CREAT (ref 0–29)
ALBUMIN/GLOB SERPL: 2.3 {RATIO} (ref 1.2–2.2)
ALP SERPL-CCNC: 62 IU/L (ref 39–117)
ALT SERPL-CCNC: 23 IU/L (ref 0–44)
AST SERPL-CCNC: 22 IU/L (ref 0–40)
BILIRUB SERPL-MCNC: 1 MG/DL (ref 0–1.2)
BUN SERPL-MCNC: 16 MG/DL (ref 6–24)
BUN/CREAT SERPL: 17 (ref 9–20)
CALCIUM SERPL-MCNC: 9.4 MG/DL (ref 8.7–10.2)
CHLORIDE SERPL-SCNC: 102 MMOL/L (ref 96–106)
CHOLEST SERPL-MCNC: 148 MG/DL (ref 100–199)
CO2 SERPL-SCNC: 23 MMOL/L (ref 20–29)
CREAT SERPL-MCNC: 0.95 MG/DL (ref 0.76–1.27)
CREAT UR-MCNC: 89.4 MG/DL
GLOBULIN SER CALC-MCNC: 2 G/DL (ref 1.5–4.5)
GLUCOSE SERPL-MCNC: 129 MG/DL (ref 65–99)
HDLC SERPL-MCNC: 39 MG/DL
INTERPRETATION, 910389: NORMAL
LDLC SERPL CALC-MCNC: 82 MG/DL (ref 0–99)
MICROALBUMIN UR-MCNC: 4.6 UG/ML
POTASSIUM SERPL-SCNC: 4.2 MMOL/L (ref 3.5–5.2)
PROT SERPL-MCNC: 6.5 G/DL (ref 6–8.5)
SODIUM SERPL-SCNC: 140 MMOL/L (ref 134–144)
TRIGL SERPL-MCNC: 133 MG/DL (ref 0–149)
VLDLC SERPL CALC-MCNC: 27 MG/DL (ref 5–40)

## 2020-02-05 DIAGNOSIS — Z79.4 UNCONTROLLED TYPE 2 DIABETES MELLITUS WITH HYPERGLYCEMIA, WITH LONG-TERM CURRENT USE OF INSULIN (HCC): ICD-10-CM

## 2020-02-05 DIAGNOSIS — E11.65 UNCONTROLLED TYPE 2 DIABETES MELLITUS WITH HYPERGLYCEMIA, WITH LONG-TERM CURRENT USE OF INSULIN (HCC): ICD-10-CM

## 2020-02-14 ENCOUNTER — TELEPHONE (OUTPATIENT)
Dept: FAMILY MEDICINE CLINIC | Age: 55
End: 2020-02-14

## 2020-02-14 NOTE — TELEPHONE ENCOUNTER
Placed outgoing call to patient to discuss setting up appointment for diabetes. Left VM for patient to return my call.     Kole Flor, PharmD, Maury Gil

## 2020-02-14 NOTE — TELEPHONE ENCOUNTER
Received patient's incoming call to discuss Diabetes (2 identifiers used). Discussed role and how I can help patient work on his blood sugar control. Patient states he can come in for a visit, but would be next month. Asked patient about current medications and asked him about Trulicity since it was noted at last OV that he was taking it about every 3 weeks. Patient states he just forgets since it is weekly, but is adherent with his other daily medications. Discussed with him that there are daily GLP-1 agonists that can be used vs weekly but patient states he doesn't want more injections since it adversely effects his skin. When I asked him in what way he said that he does get lumps under his skin. I advised him to no inject into/near any lump since it would negatively effect absorption of his insulin. Recommended that he use an adherence aid to help him remember (calendar, reminder on his phone, etc) since he is not getting the full benefit of this medication which is worsening his A1C. Discussed importance of this as well as lifestyle changes since he is already on max doses of his medications and the next step would be in addition of mealtime insulin. Another option would be to change his Lantus to Toujeo since this would have better absorption due to the smaller volume. Patient verbalized understanding and will call back for appointment, provided him days that I was in the office.     Tano Bowling, PharmD, Lico Laboy

## 2020-02-18 DIAGNOSIS — E78.2 MIXED HYPERLIPIDEMIA: ICD-10-CM

## 2020-02-18 NOTE — TELEPHONE ENCOUNTER
Pharm is requesting a refill on the following meds. Pharm on file verified.     Last refill 09/25/2019    Requested Prescriptions     Pending Prescriptions Disp Refills    atorvastatin (LIPITOR) 40 mg tablet 30 Tab 3

## 2020-02-20 RX ORDER — ATORVASTATIN CALCIUM 40 MG/1
40 TABLET, FILM COATED ORAL DAILY
Qty: 30 TAB | Refills: 3 | Status: SHIPPED | OUTPATIENT
Start: 2020-02-20 | End: 2020-06-18

## 2020-03-14 DIAGNOSIS — Z79.4 UNCONTROLLED TYPE 2 DIABETES MELLITUS WITH HYPERGLYCEMIA, WITH LONG-TERM CURRENT USE OF INSULIN (HCC): ICD-10-CM

## 2020-03-14 DIAGNOSIS — K21.9 GASTROESOPHAGEAL REFLUX DISEASE WITHOUT ESOPHAGITIS: ICD-10-CM

## 2020-03-14 DIAGNOSIS — E11.65 UNCONTROLLED TYPE 2 DIABETES MELLITUS WITH HYPERGLYCEMIA, WITH LONG-TERM CURRENT USE OF INSULIN (HCC): ICD-10-CM

## 2020-03-16 RX ORDER — PANTOPRAZOLE SODIUM 40 MG/1
TABLET, DELAYED RELEASE ORAL
Qty: 30 TAB | Refills: 2 | Status: SHIPPED | OUTPATIENT
Start: 2020-03-16 | End: 2020-06-18

## 2020-03-16 RX ORDER — METFORMIN HYDROCHLORIDE 1000 MG/1
TABLET ORAL
Qty: 60 TAB | Refills: 4 | Status: SHIPPED | OUTPATIENT
Start: 2020-03-16 | End: 2020-08-30

## 2020-03-18 ENCOUNTER — TELEPHONE (OUTPATIENT)
Dept: FAMILY MEDICINE CLINIC | Age: 55
End: 2020-03-18

## 2020-03-18 NOTE — LETTER
NOTIFICATION RETURN TO WORK / SCHOOL 
 
3/19/2020 12:29 PM 
 
Mr. Missael [facility]ames [phone] To Whom It May Concern: 
 
 
Missael Adames is currently under the care of [facility] Dominguez. Patient has diagnosis of Uncontrolled Diabetes, High BP. Please let patient work from home due to he is 
  
high risk for CoronaVirus disease. 
  
If there are questions or concerns please have the patient contact our office. Sincerely, Luiz Donato MD

## 2020-03-18 NOTE — TELEPHONE ENCOUNTER
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 I informed him I would send message to you to see if this qualifies for high risk.

## 2020-03-18 NOTE — TELEPHONE ENCOUNTER
Pt. is calling requesting a call back in regards to a work note to stay out of work. Pt. Madisyn Blair he is diabetic and at a higher risk of getting the virus at his work.      Best contact# [phone]

## 2020-03-19 NOTE — TELEPHONE ENCOUNTER
[phone] notified patient per Dr. Amari Michaud he is high risk for corona virus due to he is has uncontrolled DM. Patient wants a letter stating if he can work from home. Per Dr. Amari Michaud ok to do letter.    Send letter via email to patient

## 2020-05-18 DIAGNOSIS — E78.2 MIXED HYPERLIPIDEMIA: ICD-10-CM

## 2020-05-19 RX ORDER — LISINOPRIL 20 MG/1
TABLET ORAL
Qty: 30 TAB | Refills: 3 | Status: SHIPPED | OUTPATIENT
Start: 2020-05-19 | End: 2020-09-16

## 2020-06-16 DIAGNOSIS — Z79.4 UNCONTROLLED TYPE 2 DIABETES MELLITUS WITH HYPERGLYCEMIA, WITH LONG-TERM CURRENT USE OF INSULIN (HCC): ICD-10-CM

## 2020-06-16 DIAGNOSIS — E11.65 UNCONTROLLED TYPE 2 DIABETES MELLITUS WITH HYPERGLYCEMIA, WITH LONG-TERM CURRENT USE OF INSULIN (HCC): ICD-10-CM

## 2020-06-16 DIAGNOSIS — E78.2 MIXED HYPERLIPIDEMIA: ICD-10-CM

## 2020-06-16 DIAGNOSIS — K21.9 GASTROESOPHAGEAL REFLUX DISEASE WITHOUT ESOPHAGITIS: ICD-10-CM

## 2020-06-18 RX ORDER — ATORVASTATIN CALCIUM 40 MG/1
TABLET, FILM COATED ORAL
Qty: 30 TAB | Refills: 2 | Status: SHIPPED | OUTPATIENT
Start: 2020-06-18 | End: 2020-09-26

## 2020-06-18 RX ORDER — PANTOPRAZOLE SODIUM 40 MG/1
TABLET, DELAYED RELEASE ORAL
Qty: 30 TAB | Refills: 1 | Status: SHIPPED | OUTPATIENT
Start: 2020-06-18 | End: 2020-08-20

## 2020-06-18 RX ORDER — INSULIN GLARGINE 100 [IU]/ML
INJECTION, SOLUTION SUBCUTANEOUS
Qty: 6 ADJUSTABLE DOSE PRE-FILLED PEN SYRINGE | Refills: 1 | Status: SHIPPED | OUTPATIENT
Start: 2020-06-18 | End: 2020-08-30

## 2020-06-29 ENCOUNTER — VIRTUAL VISIT (OUTPATIENT)
Dept: FAMILY MEDICINE CLINIC | Age: 55
End: 2020-06-29

## 2020-06-29 DIAGNOSIS — E78.2 MIXED HYPERLIPIDEMIA: ICD-10-CM

## 2020-06-29 DIAGNOSIS — E11.65 UNCONTROLLED TYPE 2 DIABETES MELLITUS WITH HYPERGLYCEMIA (HCC): Primary | ICD-10-CM

## 2020-06-29 DIAGNOSIS — I10 ESSENTIAL HYPERTENSION, BENIGN: ICD-10-CM

## 2020-06-29 DIAGNOSIS — E66.01 SEVERE OBESITY (BMI 35.0-39.9) WITH COMORBIDITY (HCC): ICD-10-CM

## 2020-06-29 NOTE — PROGRESS NOTES
Alonso Knight is a 62 y.o. male who was seen by synchronous (real-time) audio-video technology on 6/29/2020 through [employer] telemedicine application. Pt did telehealth for chronic follow up. Pt has been walking 2 miles a day. Has not been compliant with his trulicity  We discussed compliance in detail. Pt also inquired about weight loss medications he wanted to see if there was something he could take for 1-3 months to jump start weight loss. BP has been stable and controlled denies any acute issues        Consent:  Services were provided through a video synchronous discussion virtually to substitute for in-person appointment. He and/or his healthcare decision maker is aware that this patient-initiated Telehealth encounter is a billable service, with coverage as determined by his insurance carrier. He is aware that he may receive a bill and has provided verbal consent to proceed: Yes    I was in the office while conducting this encounter. Subjective:   Alonso Knight was seen for Follow-up      Prior to Admission medications    Medication Sig Start Date End Date Taking?  Authorizing Provider   pantoprazole (PROTONIX) 40 mg tablet TAKE ONE TABLET BY MOUTH DAILY 6/18/20   Amber Naik MD   atorvastatin (LIPITOR) 40 mg tablet TAKE ONE TABLET BY MOUTH DAILY 6/18/20   Amber Naik MD   insulin glargine (Lantus Solostar U-100 Insulin) 100 unit/mL (3 mL) inpn INJECT 60 UNITS UNDER THE SKIN DAILY 6/18/20   Amber Naik MD   lisinopriL (PRINIVIL, ZESTRIL) 20 mg tablet TAKE ONE TABLET BY MOUTH DAILY 5/19/20   Amber Naik MD   metFORMIN (GLUCOPHAGE) 1,000 mg tablet TAKE ONE TABLET BY MOUTH TWICE A DAY WITH MEALS 3/16/20   Amber Naik MD   empagliflozin (Jardiance) 25 mg tablet TAKE ONE TABLET BY MOUTH DAILY 3/16/20   Moe Naik MD   dulaglutide (TRULICITY) 1.5 IC/8.3 mL sub-q pen INJECT 1.5 MG UNDER THE SKIN ONCE EVERY 7 DAYS 2/9/20   Amber Naik MD   pioglitazone (ACTOS) 30 mg tablet TAKE ONE TABLET BY MOUTH DAILY 12/19/19   Radha Naik MD   sildenafil citrate (VIAGRA) 100 mg tablet TAKE 1 TABLET BY MOUTH AS NEEDED 30 MINS PRIOR TO INTIMACY. 9/9/19   Marissa Pike MD   varicella-zoster recombinant, PF, (SHINGRIX, PF,) 50 mcg/0.5 mL susr injection 0.5 mL by IntraMUSCular route once. Provider, Historical   phentermine (ADIPEX-P) 37.5 mg tablet Take 1 Tab by mouth every morning. Max Daily Amount: 37.5 mg. 8/28/19   Radha Naik MD   insulin glargine (LANTUS SOLOSTAR U-100 INSULIN) 100 unit/mL (3 mL) inpn INJECT 60 UNITS UNDER THE SKIN DAILY. 6/25/19   Radha Naik MD   Insulin Needles, Disposable, (OUSMANE PEN NEEDLE) 32 gauge x 5/32\" ndle USE ONCE DAILY. Please dispense whatever insurance covers 6/5/19   Marissa Pike MD   NOVOTWIST 32 gauge x 1/5\" ndle USE ONCE DAILY 5/27/19   Radha Naik MD   glucose blood VI test strips (BLOOD GLUCOSE TEST) strip Accu-Chek Comfort Curv,    Dx code 250.02    Test blood sugar twice daily or as directed by Dr. Erica Otto. 7/17/17   Radha Naik MD   Lancets misc Accu-Chek Comfort Curv monitor   Test blood sugar twice a day or as directed by Dr. Erica Otto.  12/14/15   Marissa Pike MD   Blood-Glucose Meter monitoring kit Check glucose daily in the Am  Dx 250.02 9/29/14   Marissa Pike MD     Allergies   Allergen Reactions    Ppa/Pe/Phenyltolox/Cpm Td Unknown (comments)     Allergies:PPA     Past Medical History:   Diagnosis Date    Allergic rhinitis due to other allergen 3/5/2010    Colon polyp     Family history of colon cancer in mother     Hypertension     Mixed hyperlipidemia 3/5/2010    Obesity, unspecified 3/5/2010    PUD (peptic ulcer disease)     15 years ago   [employer] hyperglyceridemia 3/5/2010    Type II or unspecified type diabetes mellitus without mention of complication, not stated as uncontrolled 3/5/2010     Past Surgical History:   Procedure Laterality Date    HX COLONOSCOPY  03/2016    Dr. Gertrudis Milligan Family History   Problem Relation Age of Onset    Cancer Mother     Heart Disease Father      Social History     Tobacco Use    Smoking status: Never Smoker    Smokeless tobacco: Never Used   Substance Use Topics    Alcohol use: Yes     Alcohol/week: 3.3 standard drinks     Types: 4 Standard drinks or equivalent per week        ROS    PHYSICAL EXAMINATION:  Vital Signs: (As obtained by patient/caregiver at home)  There were no vitals taken for this visit. Constitutional: [x] Appears well-developed and well-nourished [x] No apparent distress      [] Abnormal -     Mental status: [x] Alert and awake  [x] Oriented to person/place/time [x] Able to follow commands    [] Abnormal -     Eyes:   EOM    [x]  Normal    [] Abnormal -   Sclera  [x]  Normal    [] Abnormal -          Discharge [x]  None visible   [] Abnormal -     HENT: [x] Normocephalic, atraumatic  [] Abnormal -   [x] Mouth/Throat: Mucous membranes are moist    External Ears [x] Normal  [] Abnormal -    Neck: [x] No visualized mass [] Abnormal -     Pulmonary/Chest: [x] Respiratory effort normal   [x] No visualized signs of difficulty breathing or respiratory distress        [] Abnormal -      Musculoskeletal:   [x] Normal gait with no signs of ataxia         [x] Normal range of motion of neck        [] Abnormal -     Neurological:        [x] No Facial Asymmetry (Cranial nerve 7 motor function) (limited exam due to video visit)          [x] No gaze palsy        [] Abnormal -          Skin:        [x] No significant exanthematous lesions or discoloration noted on facial skin         [] Abnormal -            Psychiatric:       [x] Normal Affect [] Abnormal -        [x] No Hallucinations    Other pertinent observable physical exam findings:-    Assessment & Plan:             We discussed the expected course, resolution and complications of the diagnosis(es) in detail.   Medication risks, benefits, costs, interactions, and alternatives were discussed as indicated. I advised her to contact the office if her condition worsens, changes or fails to improve as anticipated. She expressed understanding with the diagnosis(es) and plan. Pursuant to the emergency declaration under the [address] and [place] [id number] waiver authority and the [employer] Act, this Virtual Visit was conducted, with patient's consent, to reduce the patient's risk of exposure to COVID-19 and provide continuity of care for an established patient. Services were provided through a video synchronous discussion virtually to substitute for in-person clinic visit. Written by hakeem Rodriguez, as dictated by Joann Quiroz M.D. Total time spent with the patient  minutes, greater than 50% of time spent counseling patient.

## 2020-06-30 NOTE — PATIENT INSTRUCTIONS
Body Mass Index: Care Instructions Your Care Instructions Body mass index (BMI) can help you see if your weight is raising your risk for health problems. It uses a formula to compare how much you weigh with how tall you are. · A BMI lower than 18.5 is considered underweight. · A BMI between 18.5 and 24.9 is considered healthy. · A BMI between 25 and 29.9 is considered overweight. A BMI of 30 or higher is considered obese. If your BMI is in the normal range, it means that you have a lower risk for weight-related health problems. If your BMI is in the overweight or obese range, you may be at increased risk for weight-related health problems, such as high blood pressure, heart disease, stroke, arthritis or joint pain, and diabetes. If your BMI is in the underweight range, you may be at increased risk for health problems such as fatigue, lower protection (immunity) against illness, muscle loss, bone loss, hair loss, and hormone problems. BMI is just one measure of your risk for weight-related health problems. You may be at higher risk for health problems if you are not active, you eat an unhealthy diet, or you drink too much alcohol or use tobacco products. Follow-up care is a key part of your treatment and safety. Be sure to make and go to all appointments, and call your doctor if you are having problems. It's also a good idea to know your test results and keep a list of the medicines you take. How can you care for yourself at home? · Practice healthy eating habits. This includes eating plenty of fruits, vegetables, whole grains, lean protein, and low-fat dairy. · If your doctor recommends it, get more exercise. Walking is a good choice. Bit by bit, increase the amount you walk every day. Try for at least 30 minutes on most days of the week. · Do not smoke. Smoking can increase your risk for health problems.  If you need help quitting, talk to your doctor about stop-smoking programs and medicines. These can increase your chances of quitting for good. · Limit alcohol to 2 drinks a day for men and 1 drink a day for women. Too much alcohol can cause health problems. If you have a BMI higher than 25 · Your doctor may do other tests to check your risk for weight-related health problems. This may include measuring the distance around your waist. A waist measurement of more than 40 inches in men or 35 inches in women can increase the risk of weight-related health problems. · Talk with your doctor about steps you can take to stay healthy or improve your health. You may need to make lifestyle changes to lose weight and stay healthy, such as changing your diet and getting regular exercise. If you have a BMI lower than 18.5 · Your doctor may do other tests to check your risk for health problems. · Talk with your doctor about steps you can take to stay healthy or improve your health. You may need to make lifestyle changes to gain or maintain weight and stay healthy, such as getting more healthy foods in your diet and doing exercises to build muscle. Where can you learn more? Go to http://zak-trista.info/ Enter S176 in the search box to learn more about \"Body Mass Index: Care Instructions. \" Current as of: December 11, 2019               Content Version: 12.5 © 1899-5306 [employer]. Care instructions adapted under license by [employer] (which disclaims liability or warranty for this information). If you have questions about a medical condition or this instruction, always ask your healthcare professional. [address] any warranty or liability for your use of this information.

## 2020-07-16 DIAGNOSIS — Z79.4 UNCONTROLLED TYPE 2 DIABETES MELLITUS WITH HYPERGLYCEMIA, WITH LONG-TERM CURRENT USE OF INSULIN (HCC): ICD-10-CM

## 2020-07-16 DIAGNOSIS — E11.65 UNCONTROLLED TYPE 2 DIABETES MELLITUS WITH HYPERGLYCEMIA, WITH LONG-TERM CURRENT USE OF INSULIN (HCC): ICD-10-CM

## 2020-07-23 ENCOUNTER — LAB ONLY (OUTPATIENT)
Dept: FAMILY MEDICINE CLINIC | Age: 55
End: 2020-07-23

## 2020-07-23 DIAGNOSIS — E78.2 MIXED HYPERLIPIDEMIA: ICD-10-CM

## 2020-07-23 DIAGNOSIS — I10 ESSENTIAL HYPERTENSION, BENIGN: ICD-10-CM

## 2020-07-23 DIAGNOSIS — E11.65 UNCONTROLLED TYPE 2 DIABETES MELLITUS WITH HYPERGLYCEMIA (HCC): ICD-10-CM

## 2020-07-24 LAB
ALBUMIN SERPL-MCNC: 4.7 G/DL (ref 3.8–4.9)
ALBUMIN/GLOB SERPL: 2.5 {RATIO} (ref 1.2–2.2)
ALP SERPL-CCNC: 57 IU/L (ref 39–117)
ALT SERPL-CCNC: 16 IU/L (ref 0–44)
AST SERPL-CCNC: 17 IU/L (ref 0–40)
BILIRUB SERPL-MCNC: 0.8 MG/DL (ref 0–1.2)
BUN SERPL-MCNC: 18 MG/DL (ref 6–24)
BUN/CREAT SERPL: 17 (ref 9–20)
CALCIUM SERPL-MCNC: 9.4 MG/DL (ref 8.7–10.2)
CHLORIDE SERPL-SCNC: 101 MMOL/L (ref 96–106)
CHOLEST SERPL-MCNC: 135 MG/DL (ref 100–199)
CO2 SERPL-SCNC: 19 MMOL/L (ref 20–29)
CREAT SERPL-MCNC: 1.05 MG/DL (ref 0.76–1.27)
EST. AVERAGE GLUCOSE BLD GHB EST-MCNC: 223 MG/DL
GLOBULIN SER CALC-MCNC: 1.9 G/DL (ref 1.5–4.5)
GLUCOSE SERPL-MCNC: 172 MG/DL (ref 65–99)
HBA1C MFR BLD: 9.4 % (ref 4.8–5.6)
HDLC SERPL-MCNC: 40 MG/DL
INTERPRETATION, 910389: NORMAL
LDLC SERPL CALC-MCNC: 71 MG/DL (ref 0–99)
POTASSIUM SERPL-SCNC: 4.7 MMOL/L (ref 3.5–5.2)
PROT SERPL-MCNC: 6.6 G/DL (ref 6–8.5)
SODIUM SERPL-SCNC: 138 MMOL/L (ref 134–144)
TRIGL SERPL-MCNC: 121 MG/DL (ref 0–149)
VLDLC SERPL CALC-MCNC: 24 MG/DL (ref 5–40)

## 2020-07-31 NOTE — PROGRESS NOTES
[phone] ([place]) attempted to call patient no answer left message on his  needs virtual visit to discuss labs and to call us back to set up appointment

## 2020-08-17 DIAGNOSIS — K21.9 GASTROESOPHAGEAL REFLUX DISEASE WITHOUT ESOPHAGITIS: ICD-10-CM

## 2020-08-20 RX ORDER — PANTOPRAZOLE SODIUM 40 MG/1
TABLET, DELAYED RELEASE ORAL
Qty: 30 TAB | Refills: 0 | Status: SHIPPED | OUTPATIENT
Start: 2020-08-20 | End: 2020-10-12

## 2020-08-28 DIAGNOSIS — Z79.4 UNCONTROLLED TYPE 2 DIABETES MELLITUS WITH HYPERGLYCEMIA, WITH LONG-TERM CURRENT USE OF INSULIN (HCC): ICD-10-CM

## 2020-08-28 DIAGNOSIS — E11.65 UNCONTROLLED TYPE 2 DIABETES MELLITUS WITH HYPERGLYCEMIA, WITH LONG-TERM CURRENT USE OF INSULIN (HCC): ICD-10-CM

## 2020-08-30 RX ORDER — INSULIN GLARGINE 100 [IU]/ML
INJECTION, SOLUTION SUBCUTANEOUS
Qty: 18 ADJUSTABLE DOSE PRE-FILLED PEN SYRINGE | Refills: 0 | Status: SHIPPED | OUTPATIENT
Start: 2020-08-30 | End: 2021-11-08 | Stop reason: SDUPTHER

## 2020-08-30 RX ORDER — METFORMIN HYDROCHLORIDE 1000 MG/1
TABLET ORAL
Qty: 60 TAB | Refills: 3 | Status: SHIPPED | OUTPATIENT
Start: 2020-08-30 | End: 2021-01-12

## 2020-08-31 ENCOUNTER — TELEPHONE (OUTPATIENT)
Dept: FAMILY MEDICINE CLINIC | Age: 55
End: 2020-08-31

## 2020-08-31 NOTE — TELEPHONE ENCOUNTER
Diabetes:     S:  Placed an outgoing call to patient to discuss his diabetes and to schedule an OV with him (2 identifiers used). Patient states he wanted to just talk on the phone and wasn't aware of his recent labwork. Current regimen:  Lantus 60 units daily (inconsistent with timing)  Trulicity 1.5 gm week (takes every 2 weeks)  Jardiance 25 mg daily  Actos 30 mg daily    SMBG monitoring: not currently doing. He denies any feelings of low sugars    Exercise: walks 2-4 miles/day (30-60 min) on most days. Discussed with him the value of coming in so we can have a visit to review his medications, diet, activity in depth so we could jointly identify ways to help him get better control of his sugars. Patient states that he wanted to know what medications he could take. Reviewed current meds with and discussed how he was at maximum doses of most of his medications and quickly approaching the dose of Lantus in which a mealtime insulin is usually added. Recommended:  · Trulicity adherence so it can have a consistent benefit on his sugars and his weight  · Start checking blood sugars so he can know how his medications and lifestyle are working/not working for him. Patient states that he needs to start working on him and will think about checking sugars and will try to take Trulicity weekly and decrease his dark chocolate intake. Agreed those would all be helpful step to take. Will try and get PCP follow-up on a Monday when I am here as I think face to face visit vs phone would benefit patient.       Meenu Steele, PharmD, BCACP    CLINICAL PHARMACY CONSULT: MED RECONCILIATION/REVIEW ADDENDUM    For Pharmacy Admin Tracking Only    PHSO: [facility] Patient?: Yes  Total # of Interventions Recommended: Count: 2  Time Spent (min): 15    Estephania Escalante, ANDREYD, BCACP

## 2020-09-14 ENCOUNTER — OFFICE VISIT (OUTPATIENT)
Dept: FAMILY MEDICINE CLINIC | Age: 55
End: 2020-09-14
Payer: COMMERCIAL

## 2020-09-14 VITALS
BODY MASS INDEX: 39.13 KG/M2 | HEIGHT: 68 IN | DIASTOLIC BLOOD PRESSURE: 74 MMHG | WEIGHT: 258.2 LBS | HEART RATE: 83 BPM | RESPIRATION RATE: 20 BRPM | OXYGEN SATURATION: 96 % | TEMPERATURE: 98.8 F | SYSTOLIC BLOOD PRESSURE: 117 MMHG

## 2020-09-14 DIAGNOSIS — Z79.4 UNCONTROLLED TYPE 2 DIABETES MELLITUS WITH HYPERGLYCEMIA, WITH LONG-TERM CURRENT USE OF INSULIN (HCC): Primary | ICD-10-CM

## 2020-09-14 DIAGNOSIS — I10 ESSENTIAL HYPERTENSION, BENIGN: ICD-10-CM

## 2020-09-14 DIAGNOSIS — E78.2 MIXED HYPERLIPIDEMIA: ICD-10-CM

## 2020-09-14 DIAGNOSIS — E11.65 UNCONTROLLED TYPE 2 DIABETES MELLITUS WITH HYPERGLYCEMIA, WITH LONG-TERM CURRENT USE OF INSULIN (HCC): Primary | ICD-10-CM

## 2020-09-14 DIAGNOSIS — E66.01 SEVERE OBESITY (BMI 35.0-39.9) WITH COMORBIDITY (HCC): ICD-10-CM

## 2020-09-14 PROCEDURE — 99214 OFFICE O/P EST MOD 30 MIN: CPT | Performed by: FAMILY MEDICINE

## 2020-09-14 RX ORDER — PIOGLITAZONEHYDROCHLORIDE 30 MG/1
30 TABLET ORAL DAILY
Qty: 30 TAB | Refills: 5 | Status: SHIPPED | OUTPATIENT
Start: 2020-09-14 | End: 2020-09-16

## 2020-09-14 RX ORDER — IBUPROFEN 200 MG
CAPSULE ORAL
Qty: 200 STRIP | Refills: 11 | Status: SHIPPED | OUTPATIENT
Start: 2020-09-14

## 2020-09-14 RX ORDER — INSULIN GLARGINE 100 [IU]/ML
INJECTION, SOLUTION SUBCUTANEOUS
Qty: 45 ADJUSTABLE DOSE PRE-FILLED PEN SYRINGE | Refills: 2 | Status: SHIPPED | OUTPATIENT
Start: 2020-09-14 | End: 2021-06-19

## 2020-09-14 NOTE — PROGRESS NOTES
Met with patient during PCP appointment to discuss medication adherence and diabetes. Current regimen:  Lantus 60 units daily  Metformin IR 1000 mg twice daily  Jardiance 25 mg daily  Trulicity 1.5 mg weekly (thursdays)  Pioglitazone 30 mg daily    Monitoring:  Not currently doing, states he needs strips and will get wife to stay on him    Exercise: 2-4 miles daily (30-60 min), will walk during lunch    Diet:  Has cut back on dark chocolate and \"sugary drinks\". During April/May patient was drinking a lot of rum drinks with fruit juices, but now is drinking straight bourbon. He denies any adverse effects with medications, will feel hypoglycemic when blood sugar approaches 90 but this hasn't happened in a while. DM:  Encouraged patient to continue with the weekly Trulicity to benefit from glycemic benefits and weight effects. Recommended that he start checking blood sugars to see exactly where his glycemic control is and if his changes to medication adherence and diet has lowered his sugars. Reviewed glycemic goals with patient and encouraged continued attention to his lifestyle. If additional therapy is needed could consider increasing Trulicity dose now that higher doses have been approved.     Austin Kyle, PharmD, BCACP    CLINICAL PHARMACY CONSULT: MED RECONCILIATION/REVIEW ADDENDUM    For Pharmacy Admin Tracking Only    PHSO: [facility] Patient?: Yes  Total # of Interventions Recommended: Count: 3  - Increased Dose #: 1  Total Interventions Accepted: 2  Time Spent (min): 20    ANDREY Oliver ChaD, BCACP

## 2020-09-14 NOTE — PROGRESS NOTES
HPI  Latha Joe 60 y.o. male  presents to the office today for a follow-up of diabetes and hypertension. Blood pressure 117/74, pulse 83, temperature 98.8 °F (37.1 °C), temperature source Oral, resp. rate 20, height 5' 8\" (1.727 m), weight 258 lb 3.2 oz (117.1 kg), SpO2 96 %. Body mass index is 39.26 kg/m². Chief Complaint   Patient presents with    Diabetes    Hypertension        Hypertension: BP at office today 117/74. Pt continues with lisinopril 20 mg/day and Actos 30 mg/day. Pt requests a refill of their medication, which I have granted. DM2: Pt's last A1c value was 9.4 on 7/23/2020. Pt continues with meformin 1,000 mg/BID, Jardinance 25 mg/day, and Trulicity injections. Pt requests a refill of their medication, which I have granted. Obesity: I have reviewed/discussed the above normal BMI with the patient. Hyperlipidemia: Lipid panel on 7/23/2020 notable for total cholesterol 135, HDL 40, LDL 71, and triglycerides 121. Pt continues with Lipitor 40 mg/day. ED: Pt continues with Viagra 100 mg/PRN. Health maintenance:       Current Outpatient Medications   Medication Sig Dispense Refill    glucose blood VI test strips (blood glucose test) strip Accu-Chek Comfort Curv,    Dx code 250.02    Test blood sugar twice daily or as directed by Dr. Jaja Aragon. 200 Strip 11    pioglitazone (ACTOS) 30 mg tablet Take 1 Tab by mouth daily. 30 Tab 5    insulin glargine (Lantus Solostar U-100 Insulin) 100 unit/mL (3 mL) inpn INJECT 60 UNITS UNDER THE SKIN DAILY.  45 Adjustable Dose Pre-filled Pen Syringe 2    metFORMIN (GLUCOPHAGE) 1,000 mg tablet TAKE ONE TABLET BY MOUTH TWICE A DAY WITH MEALS 60 Tab 3    pantoprazole (PROTONIX) 40 mg tablet TAKE ONE TABLET BY MOUTH DAILY 30 Tab 0    empagliflozin (Jardiance) 25 mg tablet TAKE ONE TABLET BY MOUTH DAILY 30 Tab 2    atorvastatin (LIPITOR) 40 mg tablet TAKE ONE TABLET BY MOUTH DAILY 30 Tab 2    lisinopriL (PRINIVIL, ZESTRIL) 20 mg tablet TAKE ONE TABLET BY MOUTH DAILY 30 Tab 3    dulaglutide (TRULICITY) 1.5 VN/1.6 mL sub-q pen INJECT 1.5 MG UNDER THE SKIN ONCE EVERY 7 DAYS 2 Pen 5    Insulin Needles, Disposable, (OUSMANE PEN NEEDLE) 32 gauge x 5/32\" ndle USE ONCE DAILY. Please dispense whatever insurance covers 100 Pen Needle 5    NOVOTWIST 32 gauge x 1/5\" ndle USE ONCE DAILY 100 Pen Needle 10    Lancets misc Accu-Chek Comfort Curv monitor   Test blood sugar twice a day or as directed by Dr. Donald Valle. 200 Each 11    Blood-Glucose Meter monitoring kit Check glucose daily in the Am  Dx 250.02 1 kit 0    Lantus Solostar U-100 Insulin 100 unit/mL (3 mL) inpn INJECT 60 UNITS UNDER THE SKIN DAILY. 18 Adjustable Dose Pre-filled Pen Syringe 0    sildenafil citrate (VIAGRA) 100 mg tablet TAKE 1 TABLET BY MOUTH AS NEEDED 30 MINS PRIOR TO INTIMACY. 8 Tab 0    varicella-zoster recombinant, PF, (SHINGRIX, PF,) 50 mcg/0.5 mL susr injection 0.5 mL by IntraMUSCular route once.  phentermine (ADIPEX-P) 37.5 mg tablet Take 1 Tab by mouth every morning. Max Daily Amount: 37.5 mg. 90 Tab 0     Allergies   Allergen Reactions    Ppa/Pe/Phenyltolox/Cpm Td Unknown (comments)     Allergies:PPA     Past Medical History:   Diagnosis Date    Allergic rhinitis due to other allergen 3/5/2010    Colon polyp     Family history of colon cancer in mother     Hypertension     Mixed hyperlipidemia 3/5/2010    Obesity, unspecified 3/5/2010    PUD (peptic ulcer disease)     15 years ago   [de-identified] hyperglyceridemia 3/5/2010    Type II or unspecified type diabetes mellitus without mention of complication, not stated as uncontrolled 3/5/2010     Past Surgical History:   Procedure Laterality Date    HX COLONOSCOPY  03/2016    Dr. Benny Hayes     Family History   Problem Relation Age of Onset    Cancer Mother     Heart Disease Father      Social History     Tobacco Use    Smoking status: Never Smoker    Smokeless tobacco: Never Used   Substance Use Topics    Alcohol use:  Yes Alcohol/week: 3.3 standard drinks     Types: 4 Standard drinks or equivalent per week        Review of Systems   Constitutional: Negative for chills and fever. HENT: Negative for hearing loss and tinnitus. Eyes: Negative for blurred vision and double vision. Respiratory: Negative for cough and shortness of breath. Cardiovascular: Negative for chest pain and palpitations. Gastrointestinal: Negative for nausea and vomiting. Genitourinary: Negative for dysuria and frequency. Musculoskeletal: Negative for back pain and falls. Skin: Negative for itching and rash. Neurological: Negative for dizziness, loss of consciousness and headaches. Endo/Heme/Allergies: Negative. Psychiatric/Behavioral: Negative for depression. The patient is not nervous/anxious. Physical Exam  Vitals signs reviewed. Constitutional:       Appearance: Normal appearance. HENT:      Head: Normocephalic and atraumatic. Right Ear: Tympanic membrane, ear canal and external ear normal.      Left Ear: Tympanic membrane, ear canal and external ear normal.      Nose: Nose normal.      Mouth/Throat:      Mouth: Mucous membranes are moist.      Pharynx: Oropharynx is clear. Eyes:      Extraocular Movements: Extraocular movements intact. Conjunctiva/sclera: Conjunctivae normal.      Pupils: Pupils are equal, round, and reactive to light. Neck:      Musculoskeletal: Normal range of motion and neck supple. Cardiovascular:      Rate and Rhythm: Normal rate and regular rhythm. Pulses: Normal pulses. Heart sounds: Normal heart sounds. Pulmonary:      Effort: Pulmonary effort is normal.      Breath sounds: Normal breath sounds. Abdominal:      General: Abdomen is flat. Bowel sounds are normal.      Palpations: Abdomen is soft. Musculoskeletal: Normal range of motion. Skin:     General: Skin is warm and dry. Neurological:      General: No focal deficit present.       Mental Status: He is alert and oriented to person, place, and time. Psychiatric:         Mood and Affect: Mood normal.         Behavior: Behavior normal.         Judgment: Judgment normal.           ASSESSMENT and PLAN  Diagnoses and all orders for this visit:    1. Uncontrolled type 2 diabetes mellitus with hyperglycemia, with long-term current use of insulin (HCC)  -     glucose blood VI test strips (blood glucose test) strip; Accu-Chek Comfort Curv,    Dx code 250.02    Test blood sugar twice daily or as directed by Dr. Aditya Rodarte.  -     pioglitazone (ACTOS) 30 mg tablet; Take 1 Tab by mouth daily. -     insulin glargine (Lantus Solostar U-100 Insulin) 100 unit/mL (3 mL) inpn; INJECT 60 UNITS UNDER THE SKIN DAILY. 2. Mixed hyperlipidemia  BP at office today 117/74. Pt continues with lisinopril 20 mg/day and Actos 30 mg/day. Pt requests a refill of their medication, which I have granted. 3. Essential hypertension, benign  BP at office today 117/74. Pt continues with lisinopril 20 mg/day and Actos 30 mg/day. Pt requests a refill of their medication, which I have granted. 4. Severe obesity (BMI 35.0-39. 9) with comorbidity ([address]   I have reviewed/discussed the above normal BMI with the patient. I have recommended the following interventions: dietary management education, guidance, and counseling, encourage exercise and monitor weight . Follow-up and Dispositions    · Return in about 2 months (around 11/17/2020) for diabetes follow up. Medication risks/benefits/costs/interactions/alternatives discussed with patient. Advised patient to call back or return to office if symptoms worsen/change/persist.  If patient cannot reach us or should anything more severe/urgent arise he/she should proceed directly to the nearest emergency department. Discussed expected course/resolution/complications of diagnosis in detail with patient.     Patient given a written after visit summary which includes diagnoses, current medications and vitals. Patient expressed understanding with the diagnosis and plan. Written by hoa Villalobos, as dictated by Larry Odell M.D.    3:22 PM - 3:46 PM    Total time spent with the patient 24 minutes, greater than 50% of time spent counseling patient.

## 2020-09-25 DIAGNOSIS — E78.2 MIXED HYPERLIPIDEMIA: ICD-10-CM

## 2020-09-25 DIAGNOSIS — E11.65 UNCONTROLLED TYPE 2 DIABETES MELLITUS WITH HYPERGLYCEMIA, WITH LONG-TERM CURRENT USE OF INSULIN (HCC): ICD-10-CM

## 2020-09-25 DIAGNOSIS — Z79.4 UNCONTROLLED TYPE 2 DIABETES MELLITUS WITH HYPERGLYCEMIA, WITH LONG-TERM CURRENT USE OF INSULIN (HCC): ICD-10-CM

## 2020-09-26 RX ORDER — ATORVASTATIN CALCIUM 40 MG/1
TABLET, FILM COATED ORAL
Qty: 30 TAB | Refills: 1 | Status: SHIPPED | OUTPATIENT
Start: 2020-09-26 | End: 2020-11-17 | Stop reason: SDUPTHER

## 2020-09-26 RX ORDER — DULAGLUTIDE 1.5 MG/.5ML
INJECTION, SOLUTION SUBCUTANEOUS
Qty: 8 SYRINGE | Refills: 2 | Status: SHIPPED | OUTPATIENT
Start: 2020-09-26 | End: 2020-11-17 | Stop reason: DRUGHIGH

## 2020-10-12 DIAGNOSIS — K21.9 GASTROESOPHAGEAL REFLUX DISEASE WITHOUT ESOPHAGITIS: ICD-10-CM

## 2020-10-12 RX ORDER — PANTOPRAZOLE SODIUM 40 MG/1
TABLET, DELAYED RELEASE ORAL
Qty: 30 TAB | Refills: 0 | Status: SHIPPED | OUTPATIENT
Start: 2020-10-12 | End: 2020-11-17

## 2020-11-15 DIAGNOSIS — K21.9 GASTROESOPHAGEAL REFLUX DISEASE WITHOUT ESOPHAGITIS: ICD-10-CM

## 2020-11-17 ENCOUNTER — OFFICE VISIT (OUTPATIENT)
Dept: FAMILY MEDICINE CLINIC | Age: 55
End: 2020-11-17
Payer: COMMERCIAL

## 2020-11-17 VITALS
WEIGHT: 263 LBS | RESPIRATION RATE: 16 BRPM | OXYGEN SATURATION: 98 % | TEMPERATURE: 97.4 F | HEART RATE: 62 BPM | HEIGHT: 68 IN | DIASTOLIC BLOOD PRESSURE: 75 MMHG | SYSTOLIC BLOOD PRESSURE: 118 MMHG | BODY MASS INDEX: 39.86 KG/M2

## 2020-11-17 DIAGNOSIS — K43.9 VENTRAL HERNIA WITHOUT OBSTRUCTION OR GANGRENE: ICD-10-CM

## 2020-11-17 DIAGNOSIS — K21.9 GASTROESOPHAGEAL REFLUX DISEASE WITHOUT ESOPHAGITIS: ICD-10-CM

## 2020-11-17 DIAGNOSIS — E78.2 MIXED HYPERLIPIDEMIA: ICD-10-CM

## 2020-11-17 DIAGNOSIS — E11.65 UNCONTROLLED TYPE 2 DIABETES MELLITUS WITH HYPERGLYCEMIA, WITH LONG-TERM CURRENT USE OF INSULIN (HCC): Primary | ICD-10-CM

## 2020-11-17 DIAGNOSIS — Z79.4 UNCONTROLLED TYPE 2 DIABETES MELLITUS WITH HYPERGLYCEMIA, WITH LONG-TERM CURRENT USE OF INSULIN (HCC): Primary | ICD-10-CM

## 2020-11-17 DIAGNOSIS — I10 ESSENTIAL HYPERTENSION, BENIGN: ICD-10-CM

## 2020-11-17 LAB — HBA1C MFR BLD HPLC: 8 %

## 2020-11-17 PROCEDURE — 3052F HG A1C>EQUAL 8.0%<EQUAL 9.0%: CPT | Performed by: FAMILY MEDICINE

## 2020-11-17 PROCEDURE — 83036 HEMOGLOBIN GLYCOSYLATED A1C: CPT | Performed by: FAMILY MEDICINE

## 2020-11-17 PROCEDURE — 99214 OFFICE O/P EST MOD 30 MIN: CPT | Performed by: FAMILY MEDICINE

## 2020-11-17 RX ORDER — DULAGLUTIDE 3 MG/.5ML
3 INJECTION, SOLUTION SUBCUTANEOUS
Qty: 4 PEN | Refills: 2 | Status: SHIPPED | OUTPATIENT
Start: 2020-11-17 | End: 2021-03-10

## 2020-11-17 RX ORDER — PEN NEEDLE, DIABETIC 31 GX3/16"
NEEDLE, DISPOSABLE MISCELLANEOUS
Qty: 100 PEN NEEDLE | Refills: 11 | Status: SHIPPED | OUTPATIENT
Start: 2020-11-17 | End: 2022-05-10 | Stop reason: SDUPTHER

## 2020-11-17 RX ORDER — PANTOPRAZOLE SODIUM 40 MG/1
TABLET, DELAYED RELEASE ORAL
Qty: 30 TAB | Refills: 5 | Status: SHIPPED | OUTPATIENT
Start: 2020-11-17 | End: 2021-09-18

## 2020-11-17 RX ORDER — PANTOPRAZOLE SODIUM 40 MG/1
TABLET, DELAYED RELEASE ORAL
Qty: 30 TAB | Refills: 0 | Status: SHIPPED | OUTPATIENT
Start: 2020-11-17 | End: 2020-11-17 | Stop reason: SDUPTHER

## 2020-11-17 RX ORDER — ATORVASTATIN CALCIUM 40 MG/1
TABLET, FILM COATED ORAL
Qty: 30 TAB | Refills: 5 | Status: SHIPPED | OUTPATIENT
Start: 2020-11-17 | End: 2021-06-11

## 2020-11-17 NOTE — PATIENT INSTRUCTIONS
Body Mass Index: Care Instructions Your Care Instructions Body mass index (BMI) can help you see if your weight is raising your risk for health problems. It uses a formula to compare how much you weigh with how tall you are. · A BMI lower than 18.5 is considered underweight. · A BMI between 18.5 and 24.9 is considered healthy. · A BMI between 25 and 29.9 is considered overweight. A BMI of 30 or higher is considered obese. If your BMI is in the normal range, it means that you have a lower risk for weight-related health problems. If your BMI is in the overweight or obese range, you may be at increased risk for weight-related health problems, such as high blood pressure, heart disease, stroke, arthritis or joint pain, and diabetes. If your BMI is in the underweight range, you may be at increased risk for health problems such as fatigue, lower protection (immunity) against illness, muscle loss, bone loss, hair loss, and hormone problems. BMI is just one measure of your risk for weight-related health problems. You may be at higher risk for health problems if you are not active, you eat an unhealthy diet, or you drink too much alcohol or use tobacco products. Follow-up care is a key part of your treatment and safety. Be sure to make and go to all appointments, and call your doctor if you are having problems. It's also a good idea to know your test results and keep a list of the medicines you take. How can you care for yourself at home? · Practice healthy eating habits. This includes eating plenty of fruits, vegetables, whole grains, lean protein, and low-fat dairy. · If your doctor recommends it, get more exercise. Walking is a good choice. Bit by bit, increase the amount you walk every day. Try for at least 30 minutes on most days of the week. · Do not smoke. Smoking can increase your risk for health problems.  If you need help quitting, talk to your doctor about stop-smoking programs and medicines. These can increase your chances of quitting for good. · Limit alcohol to 2 drinks a day for men and 1 drink a day for women. Too much alcohol can cause health problems. If you have a BMI higher than 25 · Your doctor may do other tests to check your risk for weight-related health problems. This may include measuring the distance around your waist. A waist measurement of more than 40 inches in men or 35 inches in women can increase the risk of weight-related health problems. · Talk with your doctor about steps you can take to stay healthy or improve your health. You may need to make lifestyle changes to lose weight and stay healthy, such as changing your diet and getting regular exercise. If you have a BMI lower than 18.5 · Your doctor may do other tests to check your risk for health problems. · Talk with your doctor about steps you can take to stay healthy or improve your health. You may need to make lifestyle changes to gain or maintain weight and stay healthy, such as getting more healthy foods in your diet and doing exercises to build muscle. Where can you learn more? Go to http://www.deras.com/ Enter S176 in the search box to learn more about \"Body Mass Index: Care Instructions. \" Current as of: December 11, 2019               Content Version: 12.6 © 0439-8029 [employer]. Care instructions adapted under license by [employer] (which disclaims liability or warranty for this information). If you have questions about a medical condition or this instruction, always ask your healthcare professional. [address] any warranty or liability for your use of this information.

## 2020-11-17 NOTE — PROGRESS NOTES
Chief Complaint   Patient presents with    Diabetes    Cholesterol Problem    Hypertension     1. Have you been to the ER, urgent care clinic since your last visit? Hospitalized since your last visit?no    2. Have you seen or consulted any other health care providers outside of the [address] [place] [address] since your last visit? Include any pap smears or colon screening.   no

## 2020-11-17 NOTE — PROGRESS NOTES
FEDERICO Maguire 60 y.o. male  presents to the office today for routine care of chronic conditions. Blood pressure 118/75, pulse 62, temperature 97.4 °F (36.3 °C), temperature source Temporal, resp. rate 16, height 5' 8\" (1.727 m), weight 263 lb (119.3 kg), SpO2 98 %. Body mass index is 39.99 kg/m². Chief Complaint   Patient presents with    Diabetes    Cholesterol Problem    Hypertension        DM2: A1c per POC today 8, a decrease from A1c of 9.4 on 7/23/2020. Pt continues with Jardiance 25 mg/day, insulin glargine, metformin 1,000 mg/BID, Actos 30 mg/day, and Trulicity 1.5 Khmer/4.8 mL injections. Pt requests a refill of his Lantus needles which I have granted. During today's OV, I have increased pt's Trulicity to 3.0 OZ/5.4 mL. GERD: Pt continues with Protonix 40 mg/day. Pt requests a refill of their medication, which I have granted. Obesity: I have reviewed/discussed the above normal BMI with the patient. Pt continues with Apidex-P 37.5 mg/day. Hyperlipidemia: Lipid panel on 7/23/2020 notable for total cholesterol 135, HDL 40, LDL 71, and triglycerides 121. Pt continues with Lipitor 40 mg/day. ED: Pt continues with Viagra 100 mg/PRN. Hypertension: BP at office today 118/75. Pt continues with lisinopril 20 mg/day. Health maintenance: Pt reports he had his flu shot on 10/22/2020. He is also up to date on his T-DAP vaccination. Pt is due for an eye exam which pt informs me he has not scheduled. I have placed orders for pt to have labwork performed during his next appointment in three months. Pt informs me that he believes he has a hernia as he has a spot that \"sticks out above my bellybutton\". Pt denies pain. I advised pt to lose weight to help improve his hernia. Current Outpatient Medications   Medication Sig Dispense Refill    Insulin Needles, Disposable, (Stacy Pen Needle) 32 gauge x 5/32\" ndle USE ONCE DAILY.  Please dispense whatever insurance covers 100 Pen Needle 11    pantoprazole (PROTONIX) 40 mg tablet TAKE ONE TABLET BY MOUTH DAILY 30 Tab 5    atorvastatin (LIPITOR) 40 mg tablet TAKE ONE TABLET BY MOUTH DAILY 30 Tab 5    dulaglutide (Trulicity) 3 BX/1.9 mL pnij 3 mg by SubCUTAneous route every seven (7) days. 4 Pen 2    empagliflozin (Jardiance) 25 mg tablet TAKE ONE TABLET BY MOUTH DAILY 30 Tab 2    pioglitazone (ACTOS) 30 mg tablet TAKE ONE TABLET BY MOUTH DAILY 30 Tab 5    lisinopriL (PRINIVIL, ZESTRIL) 20 mg tablet TAKE ONE TABLET BY MOUTH DAILY 30 Tab 5    glucose blood VI test strips (blood glucose test) strip Accu-Chek Comfort Curv,    Dx code 250.02    Test blood sugar twice daily or as directed by Dr. Nunu Patel. 200 Strip 11    insulin glargine (Lantus Solostar U-100 Insulin) 100 unit/mL (3 mL) inpn INJECT 60 UNITS UNDER THE SKIN DAILY. 45 Adjustable Dose Pre-filled Pen Syringe 2    Lantus Solostar U-100 Insulin 100 unit/mL (3 mL) inpn INJECT 60 UNITS UNDER THE SKIN DAILY. 18 Adjustable Dose Pre-filled Pen Syringe 0    metFORMIN (GLUCOPHAGE) 1,000 mg tablet TAKE ONE TABLET BY MOUTH TWICE A DAY WITH MEALS 60 Tab 3    NOVOTWIST 32 gauge x 1/5\" ndle USE ONCE DAILY 100 Pen Needle 10    Lancets misc Accu-Chek Comfort Curv monitor   Test blood sugar twice a day or as directed by Dr. Nunu Patel. 200 Each 11    Blood-Glucose Meter monitoring kit Check glucose daily in the Am  Dx 250.02 1 kit 0    sildenafil citrate (VIAGRA) 100 mg tablet TAKE 1 TABLET BY MOUTH AS NEEDED 30 MINS PRIOR TO INTIMACY. 8 Tab 0    phentermine (ADIPEX-P) 37.5 mg tablet Take 1 Tab by mouth every morning.  Max Daily Amount: 37.5 mg. 90 Tab 0     Allergies   Allergen Reactions    Ppa/Pe/Phenyltolox/Cpm Td Unknown (comments)     Allergies:PPA     Past Medical History:   Diagnosis Date    Allergic rhinitis due to other allergen 3/5/2010    Colon polyp     Family history of colon cancer in mother     Hypertension     Mixed hyperlipidemia 3/5/2010    Obesity, unspecified 3/5/2010    PUD (peptic ulcer disease)     15 years ago   [de-identified] hyperglyceridemia 3/5/2010    Type II or unspecified type diabetes mellitus without mention of complication, not stated as uncontrolled 3/5/2010     Past Surgical History:   Procedure Laterality Date    HX COLONOSCOPY  03/2016    Dr. Karen Lozano     Family History   Problem Relation Age of Onset    Cancer Mother     Heart Disease Father      Social History     Tobacco Use    Smoking status: Never Smoker    Smokeless tobacco: Never Used   Substance Use Topics    Alcohol use: Yes     Alcohol/week: 3.3 standard drinks     Types: 4 Standard drinks or equivalent per week        Review of Systems   Constitutional: Negative for chills and fever. HENT: Negative for hearing loss and tinnitus. Eyes: Negative for blurred vision and double vision. Respiratory: Negative for cough and shortness of breath. Cardiovascular: Negative for chest pain and palpitations. Gastrointestinal: Negative for nausea and vomiting. Genitourinary: Negative for dysuria and frequency. Musculoskeletal: Negative for back pain and falls. Skin: Negative for itching and rash. Neurological: Negative for dizziness, loss of consciousness and headaches. Endo/Heme/Allergies: Negative. Psychiatric/Behavioral: Negative for depression. The patient is not nervous/anxious. Physical Exam  Vitals signs reviewed. Constitutional:       Appearance: Normal appearance. HENT:      Head: Normocephalic and atraumatic. Right Ear: Tympanic membrane, ear canal and external ear normal.      Left Ear: Tympanic membrane, ear canal and external ear normal.      Nose: Nose normal.      Mouth/Throat:      Mouth: Mucous membranes are moist.      Pharynx: Oropharynx is clear. Eyes:      Extraocular Movements: Extraocular movements intact. Conjunctiva/sclera: Conjunctivae normal.      Pupils: Pupils are equal, round, and reactive to light.    Neck:      Musculoskeletal: Normal range of motion and neck supple. Cardiovascular:      Rate and Rhythm: Normal rate and regular rhythm. Pulses: Normal pulses. Heart sounds: Normal heart sounds. Pulmonary:      Effort: Pulmonary effort is normal.      Breath sounds: Normal breath sounds. Abdominal:      General: Abdomen is flat. Bowel sounds are normal.      Palpations: Abdomen is soft. Musculoskeletal: Normal range of motion. Skin:     General: Skin is warm and dry. Neurological:      General: No focal deficit present. Mental Status: He is alert and oriented to person, place, and time. Psychiatric:         Mood and Affect: Mood normal.         Behavior: Behavior normal.         Judgment: Judgment normal.           ASSESSMENT and PLAN  Diagnoses and all orders for this visit:    1. Uncontrolled type 2 diabetes mellitus with hyperglycemia, with long-term current use of insulin (HCC)  A1c per POC today 8, a decrease from A1c of 9.4 on 7/23/2020. Pt continues with Jardiance 25 mg/day, insulin glargine, metformin 1,000 mg/BID, Actos 30 mg/day, and Trulicity 1.5 II/8.5 mL injections. Pt requests a refill of his Lantus needles which I have granted. During today's OV, I have increased pt's Trulicity to 3.0 BU/9.3 mL.   -     AMB POC HEMOGLOBIN A1C  -     Insulin Needles, Disposable, (Stacy Pen Needle) 32 gauge x 5/32\" ndle; USE ONCE DAILY. Please dispense whatever insurance covers  -     dulaglutide (Trulicity) 3 SY/9.8 mL pnij; 3 mg by SubCUTAneous route every seven (7) days.  -     REFERRAL TO OPHTHALMOLOGY    2. Mixed hyperlipidemia  Lipid panel on 7/23/2020 notable for total cholesterol 135, HDL 40, LDL 71, and triglycerides 121. Pt continues with Lipitor 40 mg/day. -     atorvastatin (LIPITOR) 40 mg tablet; TAKE ONE TABLET BY MOUTH DAILY    3. Essential hypertension, benign  BP at office today 118/75. Pt continues with lisinopril 20 mg/day.      4. Gastroesophageal reflux disease without esophagitis  Pt continues with Protonix 40 mg/day. Pt requests a refill of their medication, which I have granted. -     pantoprazole (PROTONIX) 40 mg tablet; TAKE ONE TABLET BY MOUTH DAILY    5. Ventral hernia without obstruction or gangrene  Pt informs me that he believes he has a hernia as he has a spot that \"sticks out above my bellybutton\". Pt denies pain. I advised pt to lose weight to help improve his hernia. Follow-up and Dispositions    · Return in about 3 months (around 2/17/2021) for hypertension, diabetes, cholesterol follow up. Medication risks/benefits/costs/interactions/alternatives discussed with patient. Advised patient to call back or return to office if symptoms worsen/change/persist.  If patient cannot reach us or should anything more severe/urgent arise he/she should proceed directly to the nearest emergency department. Discussed expected course/resolution/complications of diagnosis in detail with patient. Patient given a written after visit summary which includes diagnoses, current medications and vitals. Patient expressed understanding with the diagnosis and plan. Written by hoa Carlos, as dictated by Dawson Mon M.D.    8:30 AM - 8:56 AM    Total time spent with the patient 26 minutes, greater than 50% of time spent counseling patient.

## 2021-01-11 DIAGNOSIS — E11.65 UNCONTROLLED TYPE 2 DIABETES MELLITUS WITH HYPERGLYCEMIA, WITH LONG-TERM CURRENT USE OF INSULIN (HCC): ICD-10-CM

## 2021-01-11 DIAGNOSIS — Z79.4 UNCONTROLLED TYPE 2 DIABETES MELLITUS WITH HYPERGLYCEMIA, WITH LONG-TERM CURRENT USE OF INSULIN (HCC): ICD-10-CM

## 2021-01-12 RX ORDER — METFORMIN HYDROCHLORIDE 1000 MG/1
TABLET ORAL
Qty: 60 TAB | Refills: 2 | Status: SHIPPED | OUTPATIENT
Start: 2021-01-12 | End: 2021-04-17

## 2021-01-18 DIAGNOSIS — E11.65 UNCONTROLLED TYPE 2 DIABETES MELLITUS WITH HYPERGLYCEMIA, WITH LONG-TERM CURRENT USE OF INSULIN (HCC): ICD-10-CM

## 2021-01-18 DIAGNOSIS — Z79.4 UNCONTROLLED TYPE 2 DIABETES MELLITUS WITH HYPERGLYCEMIA, WITH LONG-TERM CURRENT USE OF INSULIN (HCC): ICD-10-CM

## 2021-03-09 DIAGNOSIS — Z79.4 UNCONTROLLED TYPE 2 DIABETES MELLITUS WITH HYPERGLYCEMIA, WITH LONG-TERM CURRENT USE OF INSULIN (HCC): ICD-10-CM

## 2021-03-09 DIAGNOSIS — E11.65 UNCONTROLLED TYPE 2 DIABETES MELLITUS WITH HYPERGLYCEMIA, WITH LONG-TERM CURRENT USE OF INSULIN (HCC): ICD-10-CM

## 2021-03-10 RX ORDER — DULAGLUTIDE 3 MG/.5ML
INJECTION, SOLUTION SUBCUTANEOUS
Qty: 2 UNSPECIFIED | Refills: 0 | Status: SHIPPED | OUTPATIENT
Start: 2021-03-10 | End: 2021-03-16 | Stop reason: SDUPTHER

## 2021-03-16 ENCOUNTER — OFFICE VISIT (OUTPATIENT)
Dept: FAMILY MEDICINE CLINIC | Age: 56
End: 2021-03-16
Payer: COMMERCIAL

## 2021-03-16 VITALS
SYSTOLIC BLOOD PRESSURE: 108 MMHG | TEMPERATURE: 98.1 F | BODY MASS INDEX: 39.56 KG/M2 | DIASTOLIC BLOOD PRESSURE: 74 MMHG | WEIGHT: 261 LBS | HEART RATE: 69 BPM | OXYGEN SATURATION: 97 % | HEIGHT: 68 IN | RESPIRATION RATE: 16 BRPM

## 2021-03-16 DIAGNOSIS — E66.01 SEVERE OBESITY (BMI 35.0-39.9) WITH COMORBIDITY (HCC): ICD-10-CM

## 2021-03-16 DIAGNOSIS — E11.65 UNCONTROLLED TYPE 2 DIABETES MELLITUS WITH HYPERGLYCEMIA, WITH LONG-TERM CURRENT USE OF INSULIN (HCC): Primary | ICD-10-CM

## 2021-03-16 DIAGNOSIS — I10 ESSENTIAL HYPERTENSION, BENIGN: ICD-10-CM

## 2021-03-16 DIAGNOSIS — Z79.4 UNCONTROLLED TYPE 2 DIABETES MELLITUS WITH HYPERGLYCEMIA, WITH LONG-TERM CURRENT USE OF INSULIN (HCC): Primary | ICD-10-CM

## 2021-03-16 DIAGNOSIS — E78.2 MIXED HYPERLIPIDEMIA: ICD-10-CM

## 2021-03-16 LAB
ALBUMIN SERPL-MCNC: 4 G/DL (ref 3.5–5)
ALBUMIN/GLOB SERPL: 1.5 {RATIO} (ref 1.1–2.2)
ALP SERPL-CCNC: 62 U/L (ref 45–117)
ALT SERPL-CCNC: 24 U/L (ref 12–78)
ANION GAP SERPL CALC-SCNC: 8 MMOL/L (ref 5–15)
AST SERPL-CCNC: 16 U/L (ref 15–37)
BILIRUB SERPL-MCNC: 0.9 MG/DL (ref 0.2–1)
BUN SERPL-MCNC: 16 MG/DL (ref 6–20)
BUN/CREAT SERPL: 19 (ref 12–20)
CALCIUM SERPL-MCNC: 9.1 MG/DL (ref 8.5–10.1)
CHLORIDE SERPL-SCNC: 106 MMOL/L (ref 97–108)
CHOLEST SERPL-MCNC: 118 MG/DL
CO2 SERPL-SCNC: 27 MMOL/L (ref 21–32)
COMMENT, HOLDF: NORMAL
CREAT SERPL-MCNC: 0.85 MG/DL (ref 0.7–1.3)
CREAT UR-MCNC: 86.5 MG/DL
GLOBULIN SER CALC-MCNC: 2.6 G/DL (ref 2–4)
GLUCOSE SERPL-MCNC: 144 MG/DL (ref 65–100)
HBA1C MFR BLD HPLC: 7.7 %
HDLC SERPL-MCNC: 44 MG/DL
HDLC SERPL: 2.7 {RATIO} (ref 0–5)
LDLC SERPL CALC-MCNC: 50 MG/DL (ref 0–100)
LIPID PROFILE,FLP: NORMAL
MICROALBUMIN UR-MCNC: 0.75 MG/DL
MICROALBUMIN/CREAT UR-RTO: 9 MG/G (ref 0–30)
POTASSIUM SERPL-SCNC: 4.2 MMOL/L (ref 3.5–5.1)
PROT SERPL-MCNC: 6.6 G/DL (ref 6.4–8.2)
SAMPLES BEING HELD,HOLD: NORMAL
SODIUM SERPL-SCNC: 141 MMOL/L (ref 136–145)
TRIGL SERPL-MCNC: 120 MG/DL (ref ?–150)
VLDLC SERPL CALC-MCNC: 24 MG/DL

## 2021-03-16 PROCEDURE — 99214 OFFICE O/P EST MOD 30 MIN: CPT | Performed by: FAMILY MEDICINE

## 2021-03-16 PROCEDURE — 83036 HEMOGLOBIN GLYCOSYLATED A1C: CPT | Performed by: FAMILY MEDICINE

## 2021-03-16 PROCEDURE — 3051F HG A1C>EQUAL 7.0%<8.0%: CPT | Performed by: FAMILY MEDICINE

## 2021-03-16 RX ORDER — DULAGLUTIDE 3 MG/.5ML
3 INJECTION, SOLUTION SUBCUTANEOUS
Qty: 4 UNSPECIFIED | Refills: 5 | Status: SHIPPED | OUTPATIENT
Start: 2021-03-16 | End: 2021-11-08 | Stop reason: SDUPTHER

## 2021-03-16 RX ORDER — PHENTERMINE HYDROCHLORIDE 37.5 MG/1
37.5 TABLET ORAL
Qty: 90 TAB | Refills: 0 | Status: SHIPPED | OUTPATIENT
Start: 2021-03-16 | End: 2021-08-02

## 2021-03-16 NOTE — PROGRESS NOTES
[facility]  Edouard Montoya 60 y.o. male  presents to the office today for routine care of diabetes. Blood pressure 108/74, pulse 69, temperature 98.1 °F (36.7 °C), temperature source Temporal, resp. rate 16, height 5' 8\" (1.727 m), weight 261 lb (118.4 kg), SpO2 97 %. Body mass index is 39.68 kg/m². Chief Complaint   Patient presents with    Diabetes    Foot Exam        DM2: A1c per POC today 7.7, a decrease from A1c of 8.0 on 11/17/2020. Pt continues with Trulicity 3 UI/1.6 mL injections, Jardiance 25 mg/day, Actos 30 mg/day, and metformin 1,000 mg/BID. A diabetic foot exam was performed during today's appointment. Hyperlipidemia: Lipid panel on 7/23/2020 notable for total cholesterol 135, HDL 40, LDL 71, and triglycerides 121. Pt continues with Lipitor 40 mg/day. Hypertension: BP at office today 108/74. Pt continues with lisinopril 20 mg/day. Obesity: I have reviewed/discussed the above normal BMI with the patient. Pt informs me he has not been using his prescription for phentermine 37.5 mg/day. I advised pt to use the medication in the morning to help with his weight loss. Health maintenance: Pt will have updated lab work performed during today's OV. Pt informs me that he has received his COVID-19 vaccinations (Spenser Mcadams). Current Outpatient Medications   Medication Sig Dispense Refill    phentermine (ADIPEX-P) 37.5 mg tablet Take 1 Tab by mouth every morning. Max Daily Amount: 37.5 mg. 90 Tab 0    dulaglutide (Trulicity) 3 DB/3.7 mL pnij 3 mg by SubCUTAneous route every seven (7) days. 4 UNSPECIFIED 5    empagliflozin (Jardiance) 25 mg tablet TAKE ONE TABLET BY MOUTH DAILY 30 Tab 1    metFORMIN (GLUCOPHAGE) 1,000 mg tablet TAKE ONE TABLET BY MOUTH TWICE A DAY WITH MEALS 60 Tab 2    Insulin Needles, Disposable, (Stacy Pen Needle) 32 gauge x 5/32\" ndle USE ONCE DAILY.  Please dispense whatever insurance covers 100 Pen Needle 11    pantoprazole (PROTONIX) 40 mg tablet TAKE ONE TABLET BY MOUTH DAILY 30 Tab 5    atorvastatin (LIPITOR) 40 mg tablet TAKE ONE TABLET BY MOUTH DAILY 30 Tab 5    pioglitazone (ACTOS) 30 mg tablet TAKE ONE TABLET BY MOUTH DAILY 30 Tab 5    lisinopriL (PRINIVIL, ZESTRIL) 20 mg tablet TAKE ONE TABLET BY MOUTH DAILY 30 Tab 5    glucose blood VI test strips (blood glucose test) strip Accu-Chek Comfort Curv,    Dx code 250.02    Test blood sugar twice daily or as directed by Dr. Alexandra Mcadams. 200 Strip 11    insulin glargine (Lantus Solostar U-100 Insulin) 100 unit/mL (3 mL) inpn INJECT 60 UNITS UNDER THE SKIN DAILY. 45 Adjustable Dose Pre-filled Pen Syringe 2    Lantus Solostar U-100 Insulin 100 unit/mL (3 mL) inpn INJECT 60 UNITS UNDER THE SKIN DAILY. 18 Adjustable Dose Pre-filled Pen Syringe 0    sildenafil citrate (VIAGRA) 100 mg tablet TAKE 1 TABLET BY MOUTH AS NEEDED 30 MINS PRIOR TO INTIMACY. 8 Tab 0    NOVOTWIST 32 gauge x 1/5\" ndle USE ONCE DAILY 100 Pen Needle 10    Blood-Glucose Meter monitoring kit Check glucose daily in the Am  Dx 250.02 1 kit 0    Lancets misc Accu-Chek Comfort Curv monitor   Test blood sugar twice a day or as directed by Dr. Alexandra Mcadams.  200 Each 11     Allergies   Allergen Reactions    Ppa/Pe/Phenyltolox/Cpm Td Unknown (comments)     Allergies:PPA     Past Medical History:   Diagnosis Date    Allergic rhinitis due to other allergen 3/5/2010    Colon polyp     Family history of colon cancer in mother     Hypertension     Mixed hyperlipidemia 3/5/2010    Obesity, unspecified 3/5/2010    PUD (peptic ulcer disease)     15 years ago   [de-identified] hyperglyceridemia 3/5/2010    Type II or unspecified type diabetes mellitus without mention of complication, not stated as uncontrolled 3/5/2010     Past Surgical History:   Procedure Laterality Date    HX COLONOSCOPY  03/2016    Dr. Barboza Corporal     Family History   Problem Relation Age of Onset    Cancer Mother     Heart Disease Father      Social History     Tobacco Use    Smoking status: Never Smoker    Smokeless tobacco: Never Used   Substance Use Topics    Alcohol use: Yes     Alcohol/week: 3.3 standard drinks     Types: 4 Standard drinks or equivalent per week        Review of Systems   Constitutional: Negative for chills and fever. HENT: Negative for hearing loss and tinnitus. Eyes: Negative for blurred vision and double vision. Respiratory: Negative for cough and shortness of breath. Cardiovascular: Negative for chest pain and palpitations. Gastrointestinal: Negative for nausea and vomiting. Genitourinary: Negative for dysuria and frequency. Musculoskeletal: Negative for back pain and falls. Skin: Negative for itching and rash. Neurological: Negative for dizziness, loss of consciousness and headaches. Endo/Heme/Allergies: Negative. Psychiatric/Behavioral: Negative for depression. The patient is not nervous/anxious. Physical Exam  Vitals signs reviewed. Constitutional:       Appearance: Normal appearance. HENT:      Head: Normocephalic and atraumatic. Right Ear: Tympanic membrane, ear canal and external ear normal.      Left Ear: Tympanic membrane, ear canal and external ear normal.      Nose: Nose normal.      Mouth/Throat:      Mouth: Mucous membranes are moist.      Pharynx: Oropharynx is clear. Eyes:      Extraocular Movements: Extraocular movements intact. Conjunctiva/sclera: Conjunctivae normal.      Pupils: Pupils are equal, round, and reactive to light. Neck:      Musculoskeletal: Normal range of motion and neck supple. Cardiovascular:      Rate and Rhythm: Normal rate and regular rhythm. Pulses: Normal pulses. Heart sounds: Normal heart sounds. Pulmonary:      Effort: Pulmonary effort is normal.      Breath sounds: Normal breath sounds. Abdominal:      General: Abdomen is flat. Bowel sounds are normal.      Palpations: Abdomen is soft. Musculoskeletal: Normal range of motion.    Feet:      Comments: Diabetic foot exam:     Left Foot:   Visual Exam: normal    Pulse DP: 2+ (normal)   Filament test: normal sensation    Vibratory sensation: normal      Right Foot:   Visual Exam: normal    Pulse DP: 2+ (normal)   Filament test: normal sensation    Vibratory sensation: normal    Skin:     General: Skin is warm and dry. Neurological:      General: No focal deficit present. Mental Status: He is alert and oriented to person, place, and time. Psychiatric:         Mood and Affect: Mood normal.         Behavior: Behavior normal.         Judgment: Judgment normal.           ASSESSMENT and PLAN  Diagnoses and all orders for this visit:    1. Uncontrolled type 2 diabetes mellitus with hyperglycemia, with long-term current use of insulin ([facility])  A1c per POC today 7.7, a decrease from A1c of 8.0 on 11/17/2020. Pt continues with Trulicity 3 DM/3.8 mL injections, Jardiance 25 mg/day, Actos 30 mg/day, and metformin 1,000 mg/BID. A diabetic foot exam was performed during today's appointment. Pt will have updated lab work performed during today's [place] [id number]; Future  -     MICROALBUMIN, UR, RAND W/ MICROALB/CREAT RATIO; Future  -     AMB POC HEMOGLOBIN A1C  -      DIABETES FOOT EXAM    2. Severe obesity (BMI 35.0-39. 9) with comorbidity ([address]  I have reviewed/discussed the above normal BMI with the patient. Pt informs me he has not been using his prescription for phentermine 37.5 mg/day. I advised pt to use the medication in the morning to help with his weight loss. Assessment & Plan:  Uncontrolled, based on history, physical exam and review of pertinent labs, studies and medications; meds reconciled; lifestyle modifications recommended. Orders:  -     phentermine (ADIPEX-P) 37.5 mg tablet; Take 1 Tab by mouth every morning. Max Daily Amount: 37.5 mg.    3. Mixed hyperlipidemia  Lipid panel on 7/23/2020 notable for total cholesterol 135, HDL 40, LDL 71, and triglycerides 121. Pt continues with Lipitor 40 mg/day. -     LIPID PANEL; Future    4. Essential hypertension, benign  BP at office today 108/74. Pt continues with lisinopril 20 mg/day. Follow-up and Dispositions    · Return in about 3 months (around 6/16/2021) for diabetes follow up. Medication risks/benefits/costs/interactions/alternatives discussed with patient. Advised patient to call back or return to office if symptoms worsen/change/persist.  If patient cannot reach us or should anything more severe/urgent arise he/she should proceed directly to the nearest emergency department. Discussed expected course/resolution/complications of diagnosis in detail with patient. Patient given a written after visit summary which includes diagnoses, current medications and vitals. Patient expressed understanding with the diagnosis and plan. Written by hoa Najera, as dictated by Marissa Marion M.D.    9:13 AM - 9:25 AM    Total time spent with the patient 12 minutes, greater than 50% of time spent counseling patient.

## 2021-03-16 NOTE — PATIENT INSTRUCTIONS
Counting Carbohydrates: Care Instructions Your Care Instructions You don't have to eat special foods when you have diabetes. You just have to be careful to eat healthy foods. Carbohydrates (carbs) raise blood sugar higher and quicker than any other nutrient. Carbs are found in desserts, breads and cereals, and fruit. They're also in starchy vegetables. These include potatoes, corn, and grains such as rice and pasta. Carbs are also in milk and yogurt. The more carbs you eat at one time, the higher your blood sugar will rise. Spreading carbs all through the day helps keep your blood sugar levels within your target range. Counting carbs is one of the best ways to keep your blood sugar under control. If you use insulin, counting carbs helps you match the right amount of insulin to the number of grams of carbs in a meal. Then you can change your diet and insulin dose as needed. Testing your blood sugar several times a day can help you learn how carbs affect your blood sugar. A registered dietitian or certified diabetes educator can help you plan meals and snacks. Follow-up care is a key part of your treatment and safety. Be sure to make and go to all appointments, and call your doctor if you are having problems. It's also a good idea to know your test results and keep a list of the medicines you take. How can you care for yourself at home? Know your daily amount of carbohydrates Your daily amount depends on several things, such as your weight, how active you are, which diabetes medicines you take, and what your goals are for your blood sugar levels. A registered dietitian or certified diabetes educator can help you plan how many carbs to include in each meal and snack. For most adults, a guideline for the daily amount of carbs is: · 45 to 60 grams at each meal. That's about the same as 3 to 4 carbohydrate servings. · 15 to 20 grams at each snack. That's about the same as 1 carbohydrate serving. Count carbs Counting carbs lets you know how much rapid-acting insulin to take before you eat. If you use an insulin pump, you get a constant rate of insulin during the day. So the pump must be programmed at meals. This gives you extra insulin to cover the rise in blood sugar after meals. If you take insulin: 
· Learn your own insulin-to-carb ratio. You and your diabetes health professional will figure out the ratio. You can do this by testing your blood sugar after meals. For example, you may need a certain amount of insulin for every 15 grams of carbs. · Add up the carb grams in a meal. Then you can figure out how many units of insulin to take based on your insulin-to-carb ratio. · Exercise lowers blood sugar. You can use less insulin than you would if you were not doing exercise. Keep in mind that timing matters. If you exercise within 1 hour after a meal, your body may need less insulin for that meal than it would if you exercised 3 hours after the meal. Test your blood sugar to find out how exercise affects your need for insulin. If you do or don't take insulin: 
· Look at labels on packaged foods. This can tell you how many carbs are in a serving. You can also use guides from the American Diabetes Association. · Be aware of portions, or serving sizes. If a package has two servings and you eat the whole package, you need to double the number of grams of carbohydrate listed for one serving. · Protein, fat, and fiber do not raise blood sugar as much as carbs do. If you eat a lot of these nutrients in a meal, your blood sugar will rise more slowly than it would otherwise. Eat from all food groups · Eat at least three meals a day. · Plan meals to include food from all the food groups. The food groups include grains, fruits, dairy, proteins, and vegetables. · Talk to your dietitian or diabetes educator about ways to add limited amounts of sweets into your meal plan.  
· If you drink alcohol, talk to your doctor. It may not be recommended when you are taking certain diabetes medicines. 
Where can you learn more? 
Go to https://[email or web address].net/[employer] 
Enter G703 in the search box to learn more about \"Counting Carbohydrates: Care Instructions.\" 
Current as of: December 20, 2019               Content Version: 12.6 
© 7456-2071 [employer].  
Care instructions adapted under license by [employer] (which disclaims liability or warranty for this information). If you have questions about a medical condition or this instruction, always ask your healthcare professional. [employer] disclaims any warranty or liability for your use of this information.

## 2021-03-16 NOTE — PROGRESS NOTES
Chief Complaint   Patient presents with    Diabetes    Foot Exam     1. Have you been to the ER, urgent care clinic since your last visit? Hospitalized since your last visit?no    2. Have you seen or consulted any other health care providers outside of the [address] [place] [address] since your last visit? Include any pap smears or colon screening. no      Eye Exam ??  VEI

## 2021-03-20 DIAGNOSIS — E11.65 UNCONTROLLED TYPE 2 DIABETES MELLITUS WITH HYPERGLYCEMIA, WITH LONG-TERM CURRENT USE OF INSULIN (HCC): ICD-10-CM

## 2021-03-20 DIAGNOSIS — Z79.4 UNCONTROLLED TYPE 2 DIABETES MELLITUS WITH HYPERGLYCEMIA, WITH LONG-TERM CURRENT USE OF INSULIN (HCC): ICD-10-CM

## 2021-04-03 DIAGNOSIS — E78.2 MIXED HYPERLIPIDEMIA: ICD-10-CM

## 2021-04-11 RX ORDER — LISINOPRIL 20 MG/1
TABLET ORAL
Qty: 30 TAB | Refills: 4 | Status: SHIPPED | OUTPATIENT
Start: 2021-04-11 | End: 2021-09-18

## 2021-04-15 DIAGNOSIS — Z79.4 UNCONTROLLED TYPE 2 DIABETES MELLITUS WITH HYPERGLYCEMIA, WITH LONG-TERM CURRENT USE OF INSULIN (HCC): ICD-10-CM

## 2021-04-15 DIAGNOSIS — E11.65 UNCONTROLLED TYPE 2 DIABETES MELLITUS WITH HYPERGLYCEMIA, WITH LONG-TERM CURRENT USE OF INSULIN (HCC): ICD-10-CM

## 2021-04-17 RX ORDER — METFORMIN HYDROCHLORIDE 1000 MG/1
TABLET ORAL
Qty: 60 TAB | Refills: 5 | Status: SHIPPED | OUTPATIENT
Start: 2021-04-17 | End: 2021-11-02

## 2021-05-10 ENCOUNTER — TELEPHONE (OUTPATIENT)
Dept: FAMILY MEDICINE CLINIC | Age: 56
End: 2021-05-10

## 2021-06-11 DIAGNOSIS — E78.2 MIXED HYPERLIPIDEMIA: ICD-10-CM

## 2021-06-11 RX ORDER — ATORVASTATIN CALCIUM 40 MG/1
TABLET, FILM COATED ORAL
Qty: 30 TABLET | Refills: 4 | Status: SHIPPED | OUTPATIENT
Start: 2021-06-11 | End: 2021-11-27

## 2021-06-16 DIAGNOSIS — E11.65 UNCONTROLLED TYPE 2 DIABETES MELLITUS WITH HYPERGLYCEMIA, WITH LONG-TERM CURRENT USE OF INSULIN (HCC): ICD-10-CM

## 2021-06-16 DIAGNOSIS — Z79.4 UNCONTROLLED TYPE 2 DIABETES MELLITUS WITH HYPERGLYCEMIA, WITH LONG-TERM CURRENT USE OF INSULIN (HCC): ICD-10-CM

## 2021-06-19 RX ORDER — INSULIN GLARGINE 100 [IU]/ML
INJECTION, SOLUTION SUBCUTANEOUS
Qty: 45 UNITS | Refills: 1 | Status: SHIPPED | OUTPATIENT
Start: 2021-06-19 | End: 2021-08-02

## 2021-08-02 ENCOUNTER — OFFICE VISIT (OUTPATIENT)
Dept: FAMILY MEDICINE CLINIC | Age: 56
End: 2021-08-02
Payer: COMMERCIAL

## 2021-08-02 VITALS
TEMPERATURE: 98 F | OXYGEN SATURATION: 97 % | HEIGHT: 68 IN | SYSTOLIC BLOOD PRESSURE: 120 MMHG | DIASTOLIC BLOOD PRESSURE: 80 MMHG | HEART RATE: 81 BPM | WEIGHT: 260 LBS | BODY MASS INDEX: 39.4 KG/M2 | RESPIRATION RATE: 16 BRPM

## 2021-08-02 DIAGNOSIS — E11.65 UNCONTROLLED TYPE 2 DIABETES MELLITUS WITH HYPERGLYCEMIA (HCC): Primary | ICD-10-CM

## 2021-08-02 LAB — HBA1C MFR BLD HPLC: 7.5 %

## 2021-08-02 PROCEDURE — 83036 HEMOGLOBIN GLYCOSYLATED A1C: CPT | Performed by: FAMILY MEDICINE

## 2021-08-02 PROCEDURE — 99214 OFFICE O/P EST MOD 30 MIN: CPT | Performed by: FAMILY MEDICINE

## 2021-08-02 PROCEDURE — 3051F HG A1C>EQUAL 7.0%<8.0%: CPT | Performed by: FAMILY MEDICINE

## 2021-08-02 NOTE — PROGRESS NOTES
HPI  Taryn Ward 60 y.o. male  presents to the office today for DM2 and anxiety. Of note, pt states that he had fried chicken and waffles for lunch. Blood pressure 120/80, pulse 81, temperature 98 °F (36.7 °C), temperature source Temporal, resp. rate 16, height 5' 8\" (1.727 m), weight 260 lb (117.9 kg), SpO2 97 %. Body mass index is 39.53 kg/m². Chief Complaint   Patient presents with    Diabetes    Stress     at work     Anxiety     at work         DM2: A1c per POC today 7.5, down from A1c of 7.7 on 03/16/21. Pt continues with Metformin 1,000mg BID, Lisinopril 20mg daily, Jardiance 67YB daily, Trulicity 0UR/3.4QO, Actos 30mg daily, and Lantus insulin. Pt states that he is in the process of scheduling his annual DM eye exam.    Hypertension: BP at office today 120/80. Pt continues with Lisinopril 40mg daily. Hyperlipidemia: Lipid panel on 03/16/21 notable for total cholesterol 118, HDL 44, LDL 50, and triglycerides 120. Pt continues with Atorvastatin 40mg daily. GERD: Pt continues with Protonix 40mg daily. He denies having any breakthrough reflux sxs. Pt states that he has had a recent worsening of anxiety and stress due to having to return to work. He is requesting that we provide him with a work note today that allows him to work from home when possible due to stress and his comorbid conditions. Health Maintenance:      Current Outpatient Medications   Medication Sig Dispense Refill    atorvastatin (LIPITOR) 40 mg tablet TAKE ONE TABLET BY MOUTH DAILY 30 Tablet 4    metFORMIN (GLUCOPHAGE) 1,000 mg tablet TAKE ONE TABLET BY MOUTH TWICE A DAY WITH MEALS 60 Tab 5    lisinopriL (PRINIVIL, ZESTRIL) 20 mg tablet TAKE ONE TABLET BY MOUTH DAILY 30 Tab 4    empagliflozin (Jardiance) 25 mg tablet TAKE ONE TABLET BY MOUTH DAILY 30 Tab 5    dulaglutide (Trulicity) 3 AG/4.4 mL pnij 3 mg by SubCUTAneous route every seven (7) days.  4 UNSPECIFIED 5    Insulin Needles, Disposable, (Stacy Pen Needle) 32 gauge x 5/32\" ndle USE ONCE DAILY. Please dispense whatever insurance covers 100 Pen Needle 11    pantoprazole (PROTONIX) 40 mg tablet TAKE ONE TABLET BY MOUTH DAILY 30 Tab 5    pioglitazone (ACTOS) 30 mg tablet TAKE ONE TABLET BY MOUTH DAILY 30 Tab 5    glucose blood VI test strips (blood glucose test) strip Accu-Chek Comfort Curv,    Dx code 250.02    Test blood sugar twice daily or as directed by Dr. Shala Larry. 200 Strip 11    Lantus Solostar U-100 Insulin 100 unit/mL (3 mL) inpn INJECT 60 UNITS UNDER THE SKIN DAILY. 18 Adjustable Dose Pre-filled Pen Syringe 0    NOVOTWIST 32 gauge x 1/5\" ndle USE ONCE DAILY 100 Pen Needle 10    Lancets misc Accu-Chek Comfort Curv monitor   Test blood sugar twice a day or as directed by Dr. Shala Larry. 200 Each 11    Blood-Glucose Meter monitoring kit Check glucose daily in the Am  Dx 250.02 1 kit 0     Allergies   Allergen Reactions    Ppa/Pe/Phenyltolox/Cpm Td Unknown (comments)     Allergies:PPA     Past Medical History:   Diagnosis Date    Allergic rhinitis due to other allergen 3/5/2010    Colon polyp     Family history of colon cancer in mother     Hypertension     Mixed hyperlipidemia 3/5/2010    Obesity, unspecified 3/5/2010    PUD (peptic ulcer disease)     15 years ago   Kalie Saenz Pure hyperglyceridemia 3/5/2010    Type II or unspecified type diabetes mellitus without mention of complication, not stated as uncontrolled 3/5/2010     Past Surgical History:   Procedure Laterality Date    HX COLONOSCOPY  03/2016    Dr. Ngoc Vaz     Family History   Problem Relation Age of Onset    Cancer Mother     Heart Disease Father      Social History     Tobacco Use    Smoking status: Never Smoker    Smokeless tobacco: Never Used   Substance Use Topics    Alcohol use: Yes     Alcohol/week: 3.3 standard drinks     Types: 4 Standard drinks or equivalent per week        Review of Systems   Constitutional: Negative for chills and fever. HENT: Negative for hearing loss and tinnitus. Eyes: Negative for blurred vision and double vision. Respiratory: Negative for cough and shortness of breath. Cardiovascular: Negative for chest pain and palpitations. Gastrointestinal: Negative for nausea and vomiting. Genitourinary: Negative for dysuria and frequency. Musculoskeletal: Negative for back pain and falls. Skin: Negative for itching and rash. Neurological: Negative for dizziness, loss of consciousness and headaches. Endo/Heme/Allergies: Negative. Psychiatric/Behavioral: Negative for depression. The patient is not nervous/anxious. Physical Exam  Vitals reviewed. Constitutional:       Appearance: Normal appearance. HENT:      Head: Normocephalic and atraumatic. Right Ear: Tympanic membrane, ear canal and external ear normal.      Left Ear: Tympanic membrane, ear canal and external ear normal.      Nose: Nose normal.      Mouth/Throat:      Mouth: Mucous membranes are moist.      Pharynx: Oropharynx is clear. Eyes:      Extraocular Movements: Extraocular movements intact. Conjunctiva/sclera: Conjunctivae normal.      Pupils: Pupils are equal, round, and reactive to light. Cardiovascular:      Rate and Rhythm: Normal rate and regular rhythm. Pulses: Normal pulses. Heart sounds: Normal heart sounds. Pulmonary:      Effort: Pulmonary effort is normal.      Breath sounds: Normal breath sounds. Abdominal:      General: Abdomen is flat. Bowel sounds are normal.      Palpations: Abdomen is soft. Musculoskeletal:         General: Normal range of motion. Cervical back: Normal range of motion and neck supple. Skin:     General: Skin is warm and dry. Neurological:      General: No focal deficit present. Mental Status: He is alert and oriented to person, place, and time.    Psychiatric:         Mood and Affect: Mood normal.         Behavior: Behavior normal.         Judgment: Judgment normal.           ASSESSMENT and PLAN  Diagnoses and all orders for this visit:    1. Uncontrolled type 2 diabetes mellitus with hyperglycemia (HCC)  A1c per POC today 7.5, down from A1c of 7.7 on 03/16/21. Pt continues with Metformin 1,000mg BID, Lisinopril 20mg daily, Jardiance 55BH daily, Trulicity 4CT/9.1BK, Actos 30mg daily, and Lantus insulin. Pt states that he is in the process of scheduling his annual DM eye exam.  -     AMB POC HEMOGLOBIN A1C    Follow-up and Dispositions    · Return in about 3 months (around 11/2/2021) for hypertension, diabetes, cholesterol follow up. Medication risks/benefits/costs/interactions/alternatives discussed with patient. Advised patient to call back or return to office if symptoms worsen/change/persist.  If patient cannot reach us or should anything more severe/urgent arise he/she should proceed directly to the nearest emergency department. Discussed expected course/resolution/complications of diagnosis in detail with patient. Patient given a written after visit summary which includes diagnoses, current medications and vitals. Patient expressed understanding with the diagnosis and plan.     Written by hoa De Jesus, as dictated by Cricket Kessler M.D.

## 2021-08-02 NOTE — PROGRESS NOTES
Chief Complaint   Patient presents with    Diabetes     1. Have you been to the ER, urgent care clinic since your last visit? Hospitalized since your last visit?no    2. Have you seen or consulted any other health care providers outside of the [address] [place] [address] since your last visit? Include any pap smears or colon screening.  no

## 2021-08-02 NOTE — LETTER
8/2/2021 3:48 PM    Mr. Debi Xiong  [address] [facility] [address]    To Whom It May Concern,  Please allow Mr. Neva Goode to work from home if possible, due to comorbid conditions and stress. If you have any questions and with Mr. Neva Goode permission, please contact me.           Sincerely,      Pasquale Hankins MD

## 2021-09-16 DIAGNOSIS — E11.65 UNCONTROLLED TYPE 2 DIABETES MELLITUS WITH HYPERGLYCEMIA, WITH LONG-TERM CURRENT USE OF INSULIN (HCC): ICD-10-CM

## 2021-09-16 DIAGNOSIS — K21.9 GASTROESOPHAGEAL REFLUX DISEASE WITHOUT ESOPHAGITIS: ICD-10-CM

## 2021-09-16 DIAGNOSIS — Z79.4 UNCONTROLLED TYPE 2 DIABETES MELLITUS WITH HYPERGLYCEMIA, WITH LONG-TERM CURRENT USE OF INSULIN (HCC): ICD-10-CM

## 2021-09-16 DIAGNOSIS — E78.2 MIXED HYPERLIPIDEMIA: ICD-10-CM

## 2021-09-18 RX ORDER — PIOGLITAZONEHYDROCHLORIDE 30 MG/1
TABLET ORAL
Qty: 30 TABLET | Refills: 5 | Status: SHIPPED | OUTPATIENT
Start: 2021-09-18 | End: 2022-04-04

## 2021-09-18 RX ORDER — PANTOPRAZOLE SODIUM 40 MG/1
TABLET, DELAYED RELEASE ORAL
Qty: 30 TABLET | Refills: 5 | Status: SHIPPED | OUTPATIENT
Start: 2021-09-18 | End: 2022-04-04

## 2021-09-18 RX ORDER — LISINOPRIL 20 MG/1
TABLET ORAL
Qty: 30 TABLET | Refills: 4 | Status: SHIPPED | OUTPATIENT
Start: 2021-09-18 | End: 2022-03-09

## 2021-10-30 DIAGNOSIS — Z79.4 UNCONTROLLED TYPE 2 DIABETES MELLITUS WITH HYPERGLYCEMIA, WITH LONG-TERM CURRENT USE OF INSULIN (HCC): ICD-10-CM

## 2021-10-30 DIAGNOSIS — E11.65 UNCONTROLLED TYPE 2 DIABETES MELLITUS WITH HYPERGLYCEMIA, WITH LONG-TERM CURRENT USE OF INSULIN (HCC): ICD-10-CM

## 2021-11-02 RX ORDER — METFORMIN HYDROCHLORIDE 1000 MG/1
TABLET ORAL
Qty: 60 TABLET | Refills: 0 | Status: SHIPPED | OUTPATIENT
Start: 2021-11-02 | End: 2021-12-02

## 2021-11-08 ENCOUNTER — OFFICE VISIT (OUTPATIENT)
Dept: FAMILY MEDICINE CLINIC | Age: 56
End: 2021-11-08
Payer: COMMERCIAL

## 2021-11-08 VITALS
WEIGHT: 255 LBS | HEART RATE: 74 BPM | HEIGHT: 68 IN | RESPIRATION RATE: 16 BRPM | OXYGEN SATURATION: 97 % | BODY MASS INDEX: 38.65 KG/M2 | DIASTOLIC BLOOD PRESSURE: 78 MMHG | SYSTOLIC BLOOD PRESSURE: 120 MMHG | TEMPERATURE: 97.5 F

## 2021-11-08 DIAGNOSIS — Z79.4 UNCONTROLLED TYPE 2 DIABETES MELLITUS WITH HYPERGLYCEMIA, WITH LONG-TERM CURRENT USE OF INSULIN (HCC): Primary | ICD-10-CM

## 2021-11-08 DIAGNOSIS — E66.01 SEVERE OBESITY (BMI 35.0-39.9) WITH COMORBIDITY (HCC): ICD-10-CM

## 2021-11-08 DIAGNOSIS — I10 ESSENTIAL HYPERTENSION, BENIGN: ICD-10-CM

## 2021-11-08 DIAGNOSIS — E78.2 MIXED HYPERLIPIDEMIA: ICD-10-CM

## 2021-11-08 DIAGNOSIS — E11.65 UNCONTROLLED TYPE 2 DIABETES MELLITUS WITH HYPERGLYCEMIA, WITH LONG-TERM CURRENT USE OF INSULIN (HCC): Primary | ICD-10-CM

## 2021-11-08 LAB — HBA1C MFR BLD HPLC: 7.8 %

## 2021-11-08 PROCEDURE — 3051F HG A1C>EQUAL 7.0%<8.0%: CPT | Performed by: FAMILY MEDICINE

## 2021-11-08 PROCEDURE — 99214 OFFICE O/P EST MOD 30 MIN: CPT | Performed by: FAMILY MEDICINE

## 2021-11-08 PROCEDURE — 83036 HEMOGLOBIN GLYCOSYLATED A1C: CPT | Performed by: FAMILY MEDICINE

## 2021-11-08 RX ORDER — INSULIN GLARGINE 100 [IU]/ML
INJECTION, SOLUTION SUBCUTANEOUS
Qty: 18 ADJUSTABLE DOSE PRE-FILLED PEN SYRINGE | Refills: 0 | Status: SHIPPED | OUTPATIENT
Start: 2021-11-08 | End: 2021-12-20

## 2021-11-08 RX ORDER — DULAGLUTIDE 3 MG/.5ML
3 INJECTION, SOLUTION SUBCUTANEOUS
Qty: 4 EACH | Refills: 2 | Status: SHIPPED | OUTPATIENT
Start: 2021-11-08 | End: 2022-02-23

## 2021-11-08 NOTE — PROGRESS NOTES
HPI Luciana Goodpasture 60 y.o. male  presents to the office today for Diabetes. Blood pressure 120/78, pulse 74, temperature 97.5 °F (36.4 °C), temperature source Temporal, resp. rate 16, height 5' 8\" (1.727 m), weight 255 lb (115.7 kg), SpO2 97 %. Body mass index is 38.77 kg/m². Chief Complaint   Patient presents with    Diabetes      Hypertension: BP at office today 120/78. Pt continues with Lisinopril 20mg take one tablet orally daily. DM2: A1c per POC today 7.8, elevated from A1c of 7.5 on 08/02/21. Pt continues with Metformin 1000mg take 1 tablet orally BID,  Actos 30mg take 1 tablet orally daily, Jardiance 25mg take one tablet orally daily, and Trulicity 4DA/5.3FF take 3mg subcutaneous route every 7 days and Insulin glargine, inject 60 units under the skin daily. Pt requests a refill of their medication, which I have granted. Pt will have updated lab work performed in the future. Obesity: I have reviewed/discussed the above normal BMI with the patient. I have recommended the following interventions: dietary management education, guidance, and counseling, encourage exercise and monitor weight. Hyperlipidemia: Lipid panel on 03/16/21 notable for total cholesterol 118, HDL 44, LDL 50, and triglycerides 120. Pt continues with Lipitor 40mg take one tablet orally daily. Health Maintenance:  Pt reports that he has gotten his annual eye exam done on 05/2021. Current Outpatient Medications   Medication Sig Dispense Refill    insulin glargine (Lantus Solostar U-100 Insulin) 100 unit/mL (3 mL) inpn INJECT 60 UNITS UNDER THE SKIN DAILY. 18 Adjustable Dose Pre-filled Pen Syringe 0    dulaglutide (Trulicity) 3 NS/9.8 mL pnij 3 mg by SubCUTAneous route every seven (7) days.  4 Each 2    metFORMIN (GLUCOPHAGE) 1,000 mg tablet TAKE ONE TABLET BY MOUTH TWICE A DAY WITH MEALS 60 Tablet 0    pantoprazole (PROTONIX) 40 mg tablet TAKE ONE TABLET BY MOUTH DAILY 30 Tablet 5    pioglitazone (ACTOS) 30 mg tablet TAKE ONE TABLET BY MOUTH DAILY 30 Tablet 5    empagliflozin (Jardiance) 25 mg tablet TAKE ONE TABLET BY MOUTH DAILY 30 Tablet 5    lisinopriL (PRINIVIL, ZESTRIL) 20 mg tablet TAKE ONE TABLET BY MOUTH DAILY 30 Tablet 4    atorvastatin (LIPITOR) 40 mg tablet TAKE ONE TABLET BY MOUTH DAILY 30 Tablet 4    Insulin Needles, Disposable, (Stacy Pen Needle) 32 gauge x 5/32\" ndle USE ONCE DAILY. Please dispense whatever insurance covers 100 Pen Needle 11    glucose blood VI test strips (blood glucose test) strip Accu-Chek Comfort Curv,    Dx code 250.02    Test blood sugar twice daily or as directed by Dr. Erica Otto. 200 Strip 11    NOVOTWIST 32 gauge x 1/5\" ndle USE ONCE DAILY 100 Pen Needle 10    Lancets misc Accu-Chek Comfort Curv monitor   Test blood sugar twice a day or as directed by Dr. Erica Otto. 200 Each 11    Blood-Glucose Meter monitoring kit Check glucose daily in the Am  Dx 250.02 1 kit 0     Allergies   Allergen Reactions    Ppa/Pe/Phenyltolox/Cpm Td Unknown (comments)     Allergies:PPA     Past Medical History:   Diagnosis Date    Allergic rhinitis due to other allergen 3/5/2010    Colon polyp     Family history of colon cancer in mother     Hypertension     Mixed hyperlipidemia 3/5/2010    Obesity, unspecified 3/5/2010    PUD (peptic ulcer disease)     15 years ago   Bindu Me Pure hyperglyceridemia 3/5/2010    Type II or unspecified type diabetes mellitus without mention of complication, not stated as uncontrolled 3/5/2010     Past Surgical History:   Procedure Laterality Date    HX COLONOSCOPY  03/2016    Dr. Gertrudis Milligan     Family History   Problem Relation Age of Onset    Cancer Mother     Heart Disease Father      Social History     Tobacco Use    Smoking status: Never Smoker    Smokeless tobacco: Never Used   Substance Use Topics    Alcohol use:  Yes     Alcohol/week: 3.3 standard drinks     Types: 4 Standard drinks or equivalent per week        Review of Systems   Constitutional: Negative for chills and fever. HENT: Negative for hearing loss and tinnitus. Eyes: Negative for blurred vision and double vision. Respiratory: Negative for cough and shortness of breath. Cardiovascular: Negative for chest pain and palpitations. Gastrointestinal: Negative for nausea and vomiting. Genitourinary: Negative for dysuria and frequency. Musculoskeletal: Negative for back pain and falls. Skin: Negative for itching and rash. Neurological: Negative for dizziness, loss of consciousness and headaches. Endo/Heme/Allergies: Negative. Psychiatric/Behavioral: Negative for depression. The patient is not nervous/anxious. Physical Exam  Vitals reviewed. Constitutional:       Appearance: Normal appearance. HENT:      Head: Normocephalic and atraumatic. Right Ear: Tympanic membrane, ear canal and external ear normal.      Left Ear: Tympanic membrane, ear canal and external ear normal.      Nose: Nose normal.      Mouth/Throat:      Mouth: Mucous membranes are moist.      Pharynx: Oropharynx is clear. Eyes:      Extraocular Movements: Extraocular movements intact. Conjunctiva/sclera: Conjunctivae normal.      Pupils: Pupils are equal, round, and reactive to light. Cardiovascular:      Rate and Rhythm: Normal rate and regular rhythm. Pulses: Normal pulses. Heart sounds: Normal heart sounds. Pulmonary:      Effort: Pulmonary effort is normal.      Breath sounds: Normal breath sounds. Abdominal:      General: Abdomen is flat. Bowel sounds are normal.      Palpations: Abdomen is soft. Musculoskeletal:         General: Normal range of motion. Cervical back: Normal range of motion and neck supple. Skin:     General: Skin is warm and dry. Neurological:      General: No focal deficit present. Mental Status: He is alert and oriented to person, place, and time.    Psychiatric:         Mood and Affect: Mood normal.         Behavior: Behavior normal. Judgment: Judgment normal.           ASSESSMENT and PLAN  Diagnoses and all orders for this visit:    1. Uncontrolled type 2 diabetes mellitus with hyperglycemia, with long-term current use of insulin ([facility])  A1c per POC today 7.8, elevated from A1c of 7.5 on 08/02/21. Pt continues with Metformin 1000mg take 1 tablet orally BID,  Actos 30mg take 1 tablet orally daily, Jardiance 25mg take one tablet orally daily, and Trulicity 3KS/2.7MC take 3mg subcutaneous route every 7 days and Insulin glargine, inject 60 units under the skin daily. Pt requests a refill of their medication, which I have granted. Pt will have updated lab work performed in the future. -     AMB POC HEMOGLOBIN A1C  -     insulin glargine (Lantus Solostar U-100 Insulin) 100 unit/mL (3 mL) inpn; INJECT 60 UNITS UNDER THE SKIN DAILY. -     dulaglutide (Trulicity) 3 AN/8.5 mL pnij; 3 mg by SubCUTAneous route every seven (7) days. 2. Essential hypertension, benign  BP at office today 120/78. Pt continues with Lisinopril 20mg take one tablet orally daily. 3. Mixed hyperlipidemia  Lipid panel on 03/16/21 notable for total cholesterol 118, HDL 44, LDL 50, and triglycerides 120. Pt continues with Lipitor 40mg take one tablet orally daily. 4. Severe obesity (BMI 35.0-39. 9) with comorbidity ([address]  I have reviewed/discussed the above normal BMI with the patient. I have recommended the following interventions: dietary management education, guidance, and counseling, encourage exercise and monitor weight. Follow-up and Dispositions    · Return in about 3 months (around 2/8/2022) for diabetes follow up. Medication risks/benefits/costs/interactions/alternatives discussed with patient. Advised patient to call back or return to office if symptoms worsen/change/persist.  If patient cannot reach us or should anything more severe/urgent arise he/she should proceed directly to the nearest emergency department.   Discussed expected course/resolution/complications of diagnosis in detail with patient. Patient given a written after visit summary which includes diagnoses, current medications and vitals. Patient expressed understanding with the diagnosis and plan. Written by hoa Trivedi, as dictated by Snehal Gabriel M.D.    4:53 PM - 5:03 PM    Total time spent with the patient 10 minutes, greater than 50% of time spent counseling patient.

## 2021-11-08 NOTE — PROGRESS NOTES
Chief Complaint   Patient presents with    Diabetes     1. Have you been to the ER, urgent care clinic since your last visit? Hospitalized since your last visit? covid back in September     2. Have you seen or consulted any other health care providers outside of the [address] [place] [address] since your last visit? Include any pap smears or colon screening.    Colonoscopy     Eye Exam - VEI

## 2021-11-22 DIAGNOSIS — E78.2 MIXED HYPERLIPIDEMIA: ICD-10-CM

## 2021-11-27 RX ORDER — ATORVASTATIN CALCIUM 40 MG/1
TABLET, FILM COATED ORAL
Qty: 30 TABLET | Refills: 4 | Status: SHIPPED | OUTPATIENT
Start: 2021-11-27 | End: 2022-05-12

## 2021-11-28 DIAGNOSIS — Z79.4 UNCONTROLLED TYPE 2 DIABETES MELLITUS WITH HYPERGLYCEMIA, WITH LONG-TERM CURRENT USE OF INSULIN (HCC): ICD-10-CM

## 2021-11-28 DIAGNOSIS — E11.65 UNCONTROLLED TYPE 2 DIABETES MELLITUS WITH HYPERGLYCEMIA, WITH LONG-TERM CURRENT USE OF INSULIN (HCC): ICD-10-CM

## 2021-12-02 RX ORDER — METFORMIN HYDROCHLORIDE 1000 MG/1
TABLET ORAL
Qty: 60 TABLET | Refills: 0 | Status: SHIPPED | OUTPATIENT
Start: 2021-12-02 | End: 2021-12-28

## 2021-12-18 DIAGNOSIS — Z79.4 UNCONTROLLED TYPE 2 DIABETES MELLITUS WITH HYPERGLYCEMIA, WITH LONG-TERM CURRENT USE OF INSULIN (HCC): ICD-10-CM

## 2021-12-18 DIAGNOSIS — E11.65 UNCONTROLLED TYPE 2 DIABETES MELLITUS WITH HYPERGLYCEMIA, WITH LONG-TERM CURRENT USE OF INSULIN (HCC): ICD-10-CM

## 2021-12-20 RX ORDER — INSULIN GLARGINE 100 [IU]/ML
INJECTION, SOLUTION SUBCUTANEOUS
Qty: 18 EACH | Refills: 0 | Status: SHIPPED | OUTPATIENT
Start: 2021-12-20 | End: 2021-12-21

## 2021-12-24 DIAGNOSIS — E11.65 UNCONTROLLED TYPE 2 DIABETES MELLITUS WITH HYPERGLYCEMIA, WITH LONG-TERM CURRENT USE OF INSULIN (HCC): ICD-10-CM

## 2021-12-24 DIAGNOSIS — Z79.4 UNCONTROLLED TYPE 2 DIABETES MELLITUS WITH HYPERGLYCEMIA, WITH LONG-TERM CURRENT USE OF INSULIN (HCC): ICD-10-CM

## 2021-12-28 RX ORDER — METFORMIN HYDROCHLORIDE 1000 MG/1
TABLET ORAL
Qty: 60 TABLET | Refills: 0 | Status: SHIPPED | OUTPATIENT
Start: 2021-12-28 | End: 2022-01-31

## 2022-01-24 ENCOUNTER — TELEPHONE (OUTPATIENT)
Dept: FAMILY MEDICINE CLINIC | Age: 57
End: 2022-01-24

## 2022-01-24 NOTE — TELEPHONE ENCOUNTER
Chief Complaint   Patient presents with    Prior Auth     Jardiance 25MG tablets:  APPROVED:  [id number]; Review Type:Prior Auth; Coverage Start Date:12/25/2021; Coverage End Date:01/24/2023;     [employer] was faxed approval notification at [phone] with confirmation received.   Mitesh Mercedes LPN

## 2022-01-24 NOTE — TELEPHONE ENCOUNTER
Chief Complaint   Patient presents with    Prior Auth     Trulicity 3HI/8.0EY pen-injectors:  APPROVED:  This request has been approved using information available on the patient's profile. [id number]; Review Type:Prior Auth; Coverage Start Date:2021; Coverage End Date:2025;     [address] [place] [address] was faxed approval notification at [phone] with confirmation received.   Shaista Aleman LPN

## 2022-01-30 DIAGNOSIS — Z79.4 UNCONTROLLED TYPE 2 DIABETES MELLITUS WITH HYPERGLYCEMIA, WITH LONG-TERM CURRENT USE OF INSULIN (HCC): ICD-10-CM

## 2022-01-30 DIAGNOSIS — E11.65 UNCONTROLLED TYPE 2 DIABETES MELLITUS WITH HYPERGLYCEMIA, WITH LONG-TERM CURRENT USE OF INSULIN (HCC): ICD-10-CM

## 2022-01-31 RX ORDER — METFORMIN HYDROCHLORIDE 1000 MG/1
TABLET ORAL
Qty: 60 TABLET | Refills: 0 | Status: SHIPPED | OUTPATIENT
Start: 2022-01-31 | End: 2022-03-09

## 2022-02-22 DIAGNOSIS — E11.65 UNCONTROLLED TYPE 2 DIABETES MELLITUS WITH HYPERGLYCEMIA, WITH LONG-TERM CURRENT USE OF INSULIN (HCC): ICD-10-CM

## 2022-02-22 DIAGNOSIS — Z79.4 UNCONTROLLED TYPE 2 DIABETES MELLITUS WITH HYPERGLYCEMIA, WITH LONG-TERM CURRENT USE OF INSULIN (HCC): ICD-10-CM

## 2022-02-23 RX ORDER — DULAGLUTIDE 3 MG/.5ML
INJECTION, SOLUTION SUBCUTANEOUS
Qty: 4 EACH | Refills: 5 | Status: SHIPPED
Start: 2022-02-23 | End: 2022-06-26 | Stop reason: DRUGHIGH

## 2022-03-06 DIAGNOSIS — Z79.4 UNCONTROLLED TYPE 2 DIABETES MELLITUS WITH HYPERGLYCEMIA, WITH LONG-TERM CURRENT USE OF INSULIN (HCC): ICD-10-CM

## 2022-03-06 DIAGNOSIS — E11.65 UNCONTROLLED TYPE 2 DIABETES MELLITUS WITH HYPERGLYCEMIA, WITH LONG-TERM CURRENT USE OF INSULIN (HCC): ICD-10-CM

## 2022-03-06 DIAGNOSIS — E78.2 MIXED HYPERLIPIDEMIA: ICD-10-CM

## 2022-03-09 RX ORDER — METFORMIN HYDROCHLORIDE 1000 MG/1
TABLET ORAL
Qty: 60 TABLET | Refills: 0 | Status: SHIPPED | OUTPATIENT
Start: 2022-03-09 | End: 2022-04-12

## 2022-03-09 RX ORDER — LISINOPRIL 20 MG/1
TABLET ORAL
Qty: 30 TABLET | Refills: 4 | Status: SHIPPED | OUTPATIENT
Start: 2022-03-09 | End: 2022-08-23

## 2022-03-20 PROBLEM — E66.01 SEVERE OBESITY (BMI 35.0-39.9) WITH COMORBIDITY (HCC): Status: ACTIVE | Noted: 2018-05-02

## 2022-04-02 DIAGNOSIS — K21.9 GASTROESOPHAGEAL REFLUX DISEASE WITHOUT ESOPHAGITIS: ICD-10-CM

## 2022-04-02 DIAGNOSIS — Z79.4 UNCONTROLLED TYPE 2 DIABETES MELLITUS WITH HYPERGLYCEMIA, WITH LONG-TERM CURRENT USE OF INSULIN (HCC): ICD-10-CM

## 2022-04-02 DIAGNOSIS — E11.65 UNCONTROLLED TYPE 2 DIABETES MELLITUS WITH HYPERGLYCEMIA, WITH LONG-TERM CURRENT USE OF INSULIN (HCC): ICD-10-CM

## 2022-04-04 RX ORDER — PIOGLITAZONEHYDROCHLORIDE 30 MG/1
TABLET ORAL
Qty: 30 TABLET | Refills: 5 | Status: SHIPPED | OUTPATIENT
Start: 2022-04-04 | End: 2022-10-24 | Stop reason: SDUPTHER

## 2022-04-04 RX ORDER — PANTOPRAZOLE SODIUM 40 MG/1
TABLET, DELAYED RELEASE ORAL
Qty: 30 TABLET | Refills: 5 | Status: SHIPPED | OUTPATIENT
Start: 2022-04-04 | End: 2022-10-24 | Stop reason: SDUPTHER

## 2022-04-07 DIAGNOSIS — Z79.4 UNCONTROLLED TYPE 2 DIABETES MELLITUS WITH HYPERGLYCEMIA, WITH LONG-TERM CURRENT USE OF INSULIN (HCC): ICD-10-CM

## 2022-04-07 DIAGNOSIS — E11.65 UNCONTROLLED TYPE 2 DIABETES MELLITUS WITH HYPERGLYCEMIA, WITH LONG-TERM CURRENT USE OF INSULIN (HCC): ICD-10-CM

## 2022-04-12 RX ORDER — METFORMIN HYDROCHLORIDE 1000 MG/1
TABLET ORAL
Qty: 60 TABLET | Refills: 0 | Status: SHIPPED | OUTPATIENT
Start: 2022-04-12 | End: 2022-05-12

## 2022-04-26 DIAGNOSIS — E11.65 UNCONTROLLED TYPE 2 DIABETES MELLITUS WITH HYPERGLYCEMIA, WITH LONG-TERM CURRENT USE OF INSULIN (HCC): ICD-10-CM

## 2022-04-26 DIAGNOSIS — Z79.4 UNCONTROLLED TYPE 2 DIABETES MELLITUS WITH HYPERGLYCEMIA, WITH LONG-TERM CURRENT USE OF INSULIN (HCC): ICD-10-CM

## 2022-04-26 NOTE — TELEPHONE ENCOUNTER
MD Annetta Vega fax stating     Lantus Solostar insulin pens require PA- Not covered.   Last rx 12/21/21 12ml + 1 refill    LOV: 11/8/21 MD Naik  Next 6/13/22 MD Naik    Plan: [employer]  Cardholder ID: [id number]  Group [id number] [id number]  Service date 4/26/22  Phone: [phone]    ThanksNiranjan

## 2022-05-02 NOTE — TELEPHONE ENCOUNTER
Patient called to remind Dr. Heath Contreras to email his insurance [facility][note 3] (medical authorization) in regards to his rx for Lantus Solostar Insulin  Pens.  Patient is completely out and needs asap    Best call back# [phone]

## 2022-05-03 RX ORDER — INSULIN GLARGINE 100 [IU]/ML
INJECTION, SOLUTION SUBCUTANEOUS
Qty: 12 ML | Refills: 1 | Status: SHIPPED
Start: 2022-05-03 | End: 2022-05-06 | Stop reason: ALTCHOICE

## 2022-05-04 ENCOUNTER — DOCUMENTATION ONLY (OUTPATIENT)
Dept: FAMILY MEDICINE CLINIC | Age: 57
End: 2022-05-04

## 2022-05-05 ENCOUNTER — TELEPHONE (OUTPATIENT)
Dept: FAMILY MEDICINE CLINIC | Age: 57
End: 2022-05-05

## 2022-05-05 DIAGNOSIS — E11.65 UNCONTROLLED TYPE 2 DIABETES MELLITUS WITH HYPERGLYCEMIA, WITH LONG-TERM CURRENT USE OF INSULIN (HCC): ICD-10-CM

## 2022-05-05 DIAGNOSIS — Z79.4 UNCONTROLLED TYPE 2 DIABETES MELLITUS WITH HYPERGLYCEMIA, WITH LONG-TERM CURRENT USE OF INSULIN (HCC): ICD-10-CM

## 2022-05-05 NOTE — TELEPHONE ENCOUNTER
MD Naik/Jessica,    Patient call and only has 1 week left of the Lantus insulin which looks like the PA was denied. Noted as denied yesterday. He needs to know how to proceed? If there is a new medication? He has been on the Lantus Solostar for 5 years. Advised someone would call him today to discuss/order medication. [phone]     Also, patient is in [address] [place] [address] for couple weeks and needs to go to this [employer] attached.   [address] [place] [address] dr. Brando Bravo

## 2022-05-05 NOTE — TELEPHONE ENCOUNTER
MD Naik,    Erich Barrera, MARGY Sevilla denial note from yesterday, patient must try and fail Semglee yfgn or insulin glargine-yfgn.       Thanks, Antonia Torres

## 2022-05-06 RX ORDER — INSULIN GLARGINE-YFGN 100 [IU]/ML
60 INJECTION, SOLUTION SUBCUTANEOUS DAILY
Qty: 4 EACH | Refills: 2 | Status: SHIPPED | OUTPATIENT
Start: 2022-05-06 | End: 2022-07-29

## 2022-05-06 RX ORDER — INSULIN GLARGINE 100 [IU]/ML
INJECTION, SOLUTION SUBCUTANEOUS
Qty: 12 ML | Refills: 1 | Status: CANCELLED | OUTPATIENT
Start: 2022-05-06

## 2022-05-10 DIAGNOSIS — E11.65 UNCONTROLLED TYPE 2 DIABETES MELLITUS WITH HYPERGLYCEMIA, WITH LONG-TERM CURRENT USE OF INSULIN (HCC): ICD-10-CM

## 2022-05-10 DIAGNOSIS — Z79.4 UNCONTROLLED TYPE 2 DIABETES MELLITUS WITH HYPERGLYCEMIA, WITH LONG-TERM CURRENT USE OF INSULIN (HCC): ICD-10-CM

## 2022-05-10 NOTE — TELEPHONE ENCOUNTER
MD Naik,    Patient call and needs new rx for pen needles. His rx has . (he only has 6 pen needles left) to [employer] attached. (Verified that he can use the same needles as he wondered if that would change due to change to [facility].)    Last Visit: 21 MD Naik  Next Appointment: 22 MD Naik  Previous Refill Encounter(s): 20 100 + 11 refills    Requested Prescriptions     Pending Prescriptions Disp Refills    Insulin Needles, Disposable, (Stacy Pen Needle) 32 gauge x \" ndle 100 Pen Needle 11     Sig: USE ONCE DAILY.  Please dispense whatever insurance covers

## 2022-05-11 RX ORDER — PEN NEEDLE, DIABETIC 31 GX3/16"
NEEDLE, DISPOSABLE MISCELLANEOUS
Qty: 100 PEN NEEDLE | Refills: 11 | Status: SHIPPED | OUTPATIENT
Start: 2022-05-11

## 2022-05-12 DIAGNOSIS — Z79.4 UNCONTROLLED TYPE 2 DIABETES MELLITUS WITH HYPERGLYCEMIA, WITH LONG-TERM CURRENT USE OF INSULIN (HCC): ICD-10-CM

## 2022-05-12 DIAGNOSIS — E11.65 UNCONTROLLED TYPE 2 DIABETES MELLITUS WITH HYPERGLYCEMIA, WITH LONG-TERM CURRENT USE OF INSULIN (HCC): ICD-10-CM

## 2022-05-12 DIAGNOSIS — E78.2 MIXED HYPERLIPIDEMIA: ICD-10-CM

## 2022-05-12 RX ORDER — ATORVASTATIN CALCIUM 40 MG/1
TABLET, FILM COATED ORAL
Qty: 30 TABLET | Refills: 4 | Status: SHIPPED | OUTPATIENT
Start: 2022-05-12 | End: 2022-10-24 | Stop reason: SDUPTHER

## 2022-05-12 RX ORDER — METFORMIN HYDROCHLORIDE 1000 MG/1
TABLET ORAL
Qty: 60 TABLET | Refills: 0 | Status: SHIPPED | OUTPATIENT
Start: 2022-05-12 | End: 2022-06-11

## 2022-06-11 DIAGNOSIS — E11.65 UNCONTROLLED TYPE 2 DIABETES MELLITUS WITH HYPERGLYCEMIA, WITH LONG-TERM CURRENT USE OF INSULIN (HCC): ICD-10-CM

## 2022-06-11 DIAGNOSIS — Z79.4 UNCONTROLLED TYPE 2 DIABETES MELLITUS WITH HYPERGLYCEMIA, WITH LONG-TERM CURRENT USE OF INSULIN (HCC): ICD-10-CM

## 2022-06-11 RX ORDER — METFORMIN HYDROCHLORIDE 1000 MG/1
TABLET ORAL
Qty: 60 TABLET | Refills: 0 | Status: SHIPPED | OUTPATIENT
Start: 2022-06-11 | End: 2022-07-26

## 2022-06-13 ENCOUNTER — VIRTUAL VISIT (OUTPATIENT)
Dept: FAMILY MEDICINE CLINIC | Age: 57
End: 2022-06-13
Payer: COMMERCIAL

## 2022-06-13 DIAGNOSIS — E66.01 SEVERE OBESITY (BMI 35.0-39.9) WITH COMORBIDITY (HCC): ICD-10-CM

## 2022-06-13 DIAGNOSIS — I10 ESSENTIAL HYPERTENSION, BENIGN: ICD-10-CM

## 2022-06-13 DIAGNOSIS — E78.2 MIXED HYPERLIPIDEMIA: ICD-10-CM

## 2022-06-13 DIAGNOSIS — Z79.4 UNCONTROLLED TYPE 2 DIABETES MELLITUS WITH HYPERGLYCEMIA, WITH LONG-TERM CURRENT USE OF INSULIN (HCC): Primary | ICD-10-CM

## 2022-06-13 DIAGNOSIS — E11.65 UNCONTROLLED TYPE 2 DIABETES MELLITUS WITH HYPERGLYCEMIA, WITH LONG-TERM CURRENT USE OF INSULIN (HCC): Primary | ICD-10-CM

## 2022-06-13 PROCEDURE — 99214 OFFICE O/P EST MOD 30 MIN: CPT | Performed by: FAMILY MEDICINE

## 2022-06-13 NOTE — PROGRESS NOTES
Celeste Grullon (: 1965) is a 60 y.o. male, established patient, here for evaluation of the following chief complaint(s):   Diabetes, Hypertension, and Cholesterol Problem       ASSESSMENT/PLAN:  Below is the assessment and plan developed based on review of pertinent history, labs, studies, and medications. 1. Uncontrolled type 2 diabetes mellitus with hyperglycemia, with long-term current use of insulin (HCC)  -     METABOLIC PANEL, COMPREHENSIVE; Future  -     MICROALBUMIN, UR, RAND W/ MICROALB/CREAT RATIO; Future  -     HEMOGLOBIN A1C WITH EAG; Future  -     HM DIABETES FOOT EXAM  2. Mixed hyperlipidemia  -     METABOLIC PANEL, COMPREHENSIVE; Future  -     LIPID PANEL; Future  3. Essential hypertension, benign  -     METABOLIC PANEL, COMPREHENSIVE; Future  4. Severe obesity (BMI 35.0-39. 9) with comorbidity ([address]      Return in about 3 months (around 2022) for diabetes follow up. SUBJECTIVE/OBJECTIVE:  HPI Mr. Fadia Darby presents today via virtual visit for his chronic conditions of diabetes, hypertension, hyperlipidemia. Patient states that he is doing good but he had a couple questions he wanted know if Semeglee was a good medication also he had questions on new medication called Mounjaro. I discussed with him that this was similar to Trulicity and that if we were going to get him on this medication to get him off the Trulicity patient states that he will continue on the Trulicity but if insurance will cover the new medication as mentioned above. Patient does not have any complaints or any issues today patient will come in next Monday for labs and he will see me again in 3 months for an appointment in the office      Review of Systems   Respiratory: Negative for shortness of breath. Cardiovascular: Negative for chest pain. Endocrine: Negative for polydipsia. Genitourinary: Negative for frequency. Neurological: Negative for dizziness and headaches.    All other systems reviewed and are negative. No data recorded     Physical Exam    [INSTRUCTIONS:  \"[x]\" Indicates a positive item  \"[]\" Indicates a negative item  -- DELETE ALL ITEMS NOT EXAMINED]    Constitutional: [x] Appears well-developed and well-nourished [x] No apparent distress      [] Abnormal -     Mental status: [x] Alert and awake  [x] Oriented to person/place/time [x] Able to follow commands    [] Abnormal -     Eyes:   EOM    [x]  Normal    [] Abnormal -   Sclera  [x]  Normal    [] Abnormal -          Discharge [x]  None visible   [] Abnormal -     HENT: [x] Normocephalic, atraumatic  [] Abnormal -   [x] Mouth/Throat: Mucous membranes are moist    External Ears [x] Normal  [] Abnormal -    Neck: [x] No visualized mass [] Abnormal -     Pulmonary/Chest: [x] Respiratory effort normal   [x] No visualized signs of difficulty breathing or respiratory distress        [] Abnormal -      Musculoskeletal:   [x] Normal gait with no signs of ataxia         [x] Normal range of motion of neck        [] Abnormal -     Neurological:        [x] No Facial Asymmetry (Cranial nerve 7 motor function) (limited exam due to video visit)          [x] No gaze palsy        [] Abnormal -          Skin:        [x] No significant exanthematous lesions or discoloration noted on facial skin         [] Abnormal -            Psychiatric:       [x] Normal Affect [] Abnormal -        [x] No Hallucinations    Other pertinent observable physical exam findings:-        On this date 06/13/2022 I have spent 35 minutes reviewing previous notes, test results and face to face (virtual) with the patient discussing the diagnosis and importance of compliance with the treatment plan as well as documenting on the day of the visit. Ranjeet Hart, was evaluated through a synchronous (real-time) audio-video encounter. The patient (or guardian if applicable) is aware that this is a billable service, which includes applicable co-pays.  This Virtual Visit was conducted with patient's (and/or legal guardian's) consent. The visit was conducted pursuant to the emergency declaration under the [address] [place] [address] and the [employer] Act. Patient identification was verified, and a caregiver was present when appropriate. The patient was located at: Home: [address] [facility] [address]  The provider was located at: Facility ([facility] Department): [facility] [id number]       An electronic signature was used to authenticate this note.   -- Soyla Schlatter, MD

## 2022-06-20 DIAGNOSIS — E78.2 MIXED HYPERLIPIDEMIA: ICD-10-CM

## 2022-06-20 DIAGNOSIS — I10 ESSENTIAL HYPERTENSION, BENIGN: ICD-10-CM

## 2022-06-20 DIAGNOSIS — Z79.4 UNCONTROLLED TYPE 2 DIABETES MELLITUS WITH HYPERGLYCEMIA, WITH LONG-TERM CURRENT USE OF INSULIN (HCC): Primary | ICD-10-CM

## 2022-06-20 DIAGNOSIS — E11.649 UNCONTROLLED TYPE 2 DIABETES MELLITUS WITH HYPOGLYCEMIA, UNSPECIFIED HYPOGLYCEMIA COMA STATUS (HCC): Primary | ICD-10-CM

## 2022-06-20 DIAGNOSIS — E11.65 UNCONTROLLED TYPE 2 DIABETES MELLITUS WITH HYPERGLYCEMIA, WITH LONG-TERM CURRENT USE OF INSULIN (HCC): Primary | ICD-10-CM

## 2022-06-21 LAB
ALBUMIN SERPL-MCNC: 4 G/DL (ref 3.5–5)
ALBUMIN/GLOB SERPL: 1.5 {RATIO} (ref 1.1–2.2)
ALP SERPL-CCNC: 63 U/L (ref 45–117)
ALT SERPL-CCNC: 26 U/L (ref 12–78)
ANION GAP SERPL CALC-SCNC: 6 MMOL/L (ref 5–15)
AST SERPL-CCNC: 16 U/L (ref 15–37)
BILIRUB SERPL-MCNC: 0.6 MG/DL (ref 0.2–1)
BUN SERPL-MCNC: 16 MG/DL (ref 6–20)
BUN/CREAT SERPL: 18 (ref 12–20)
CALCIUM SERPL-MCNC: 9.1 MG/DL (ref 8.5–10.1)
CHLORIDE SERPL-SCNC: 107 MMOL/L (ref 97–108)
CHOLEST SERPL-MCNC: 124 MG/DL
CO2 SERPL-SCNC: 26 MMOL/L (ref 21–32)
CREAT SERPL-MCNC: 0.9 MG/DL (ref 0.7–1.3)
CREAT UR-MCNC: 41.4 MG/DL
EST. AVERAGE GLUCOSE BLD GHB EST-MCNC: 217 MG/DL
GLOBULIN SER CALC-MCNC: 2.7 G/DL (ref 2–4)
GLUCOSE SERPL-MCNC: 150 MG/DL (ref 65–100)
HBA1C MFR BLD: 9.2 % (ref 4–5.6)
HDLC SERPL-MCNC: 45 MG/DL
HDLC SERPL: 2.8 {RATIO} (ref 0–5)
LDLC SERPL CALC-MCNC: 60.2 MG/DL (ref 0–100)
MICROALBUMIN UR-MCNC: 0.58 MG/DL
MICROALBUMIN/CREAT UR-RTO: 14 MG/G (ref 0–30)
POTASSIUM SERPL-SCNC: 4.5 MMOL/L (ref 3.5–5.1)
PROT SERPL-MCNC: 6.7 G/DL (ref 6.4–8.2)
SODIUM SERPL-SCNC: 139 MMOL/L (ref 136–145)
TRIGL SERPL-MCNC: 94 MG/DL (ref ?–150)
VLDLC SERPL CALC-MCNC: 18.8 MG/DL

## 2022-06-26 RX ORDER — DULAGLUTIDE 4.5 MG/.5ML
4.5 INJECTION, SOLUTION SUBCUTANEOUS
Qty: 4 EACH | Refills: 5 | Status: SHIPPED | OUTPATIENT
Start: 2022-06-26 | End: 2022-10-24 | Stop reason: SDUPTHER

## 2022-07-25 DIAGNOSIS — Z79.4 UNCONTROLLED TYPE 2 DIABETES MELLITUS WITH HYPERGLYCEMIA, WITH LONG-TERM CURRENT USE OF INSULIN (HCC): ICD-10-CM

## 2022-07-25 DIAGNOSIS — E11.65 UNCONTROLLED TYPE 2 DIABETES MELLITUS WITH HYPERGLYCEMIA, WITH LONG-TERM CURRENT USE OF INSULIN (HCC): ICD-10-CM

## 2022-07-26 RX ORDER — METFORMIN HYDROCHLORIDE 1000 MG/1
TABLET ORAL
Qty: 60 TABLET | Refills: 0 | Status: SHIPPED | OUTPATIENT
Start: 2022-07-26 | End: 2022-08-23

## 2022-09-29 DIAGNOSIS — Z79.4 UNCONTROLLED TYPE 2 DIABETES MELLITUS WITH HYPERGLYCEMIA, WITH LONG-TERM CURRENT USE OF INSULIN (HCC): ICD-10-CM

## 2022-09-29 DIAGNOSIS — E11.65 UNCONTROLLED TYPE 2 DIABETES MELLITUS WITH HYPERGLYCEMIA, WITH LONG-TERM CURRENT USE OF INSULIN (HCC): ICD-10-CM

## 2022-10-24 ENCOUNTER — OFFICE VISIT (OUTPATIENT)
Dept: FAMILY MEDICINE CLINIC | Age: 57
End: 2022-10-24
Payer: COMMERCIAL

## 2022-10-24 VITALS
HEART RATE: 69 BPM | OXYGEN SATURATION: 97 % | HEIGHT: 68 IN | BODY MASS INDEX: 38.65 KG/M2 | WEIGHT: 255 LBS | TEMPERATURE: 97.5 F | DIASTOLIC BLOOD PRESSURE: 60 MMHG | RESPIRATION RATE: 16 BRPM | SYSTOLIC BLOOD PRESSURE: 92 MMHG

## 2022-10-24 DIAGNOSIS — E78.2 MIXED HYPERLIPIDEMIA: ICD-10-CM

## 2022-10-24 DIAGNOSIS — E11.65 UNCONTROLLED TYPE 2 DIABETES MELLITUS WITH HYPERGLYCEMIA, WITH LONG-TERM CURRENT USE OF INSULIN (HCC): Primary | ICD-10-CM

## 2022-10-24 DIAGNOSIS — Z23 NEEDS FLU SHOT: ICD-10-CM

## 2022-10-24 DIAGNOSIS — E11.649 UNCONTROLLED TYPE 2 DIABETES MELLITUS WITH HYPOGLYCEMIA, UNSPECIFIED HYPOGLYCEMIA COMA STATUS (HCC): ICD-10-CM

## 2022-10-24 DIAGNOSIS — Z79.4 UNCONTROLLED TYPE 2 DIABETES MELLITUS WITH HYPERGLYCEMIA, WITH LONG-TERM CURRENT USE OF INSULIN (HCC): Primary | ICD-10-CM

## 2022-10-24 DIAGNOSIS — K21.9 GASTROESOPHAGEAL REFLUX DISEASE WITHOUT ESOPHAGITIS: ICD-10-CM

## 2022-10-24 LAB — HBA1C MFR BLD HPLC: 8.4 %

## 2022-10-24 PROCEDURE — 3074F SYST BP LT 130 MM HG: CPT | Performed by: FAMILY MEDICINE

## 2022-10-24 PROCEDURE — 3078F DIAST BP <80 MM HG: CPT | Performed by: FAMILY MEDICINE

## 2022-10-24 PROCEDURE — 83036 HEMOGLOBIN GLYCOSYLATED A1C: CPT | Performed by: FAMILY MEDICINE

## 2022-10-24 PROCEDURE — 3052F HG A1C>EQUAL 8.0%<EQUAL 9.0%: CPT | Performed by: FAMILY MEDICINE

## 2022-10-24 PROCEDURE — 90686 IIV4 VACC NO PRSV 0.5 ML IM: CPT | Performed by: FAMILY MEDICINE

## 2022-10-24 PROCEDURE — 90471 IMMUNIZATION ADMIN: CPT | Performed by: FAMILY MEDICINE

## 2022-10-24 PROCEDURE — 99214 OFFICE O/P EST MOD 30 MIN: CPT | Performed by: FAMILY MEDICINE

## 2022-10-24 RX ORDER — METFORMIN HYDROCHLORIDE 1000 MG/1
1000 TABLET ORAL 2 TIMES DAILY WITH MEALS
Qty: 60 TABLET | Refills: 5 | Status: SHIPPED | OUTPATIENT
Start: 2022-10-24

## 2022-10-24 RX ORDER — PIOGLITAZONEHYDROCHLORIDE 30 MG/1
30 TABLET ORAL DAILY
Qty: 30 TABLET | Refills: 5 | Status: SHIPPED | OUTPATIENT
Start: 2022-10-24

## 2022-10-24 RX ORDER — ATORVASTATIN CALCIUM 40 MG/1
40 TABLET, FILM COATED ORAL DAILY
Qty: 30 TABLET | Refills: 5 | Status: SHIPPED | OUTPATIENT
Start: 2022-10-24

## 2022-10-24 RX ORDER — DULAGLUTIDE 4.5 MG/.5ML
4.5 INJECTION, SOLUTION SUBCUTANEOUS
Qty: 4 EACH | Refills: 2 | Status: SHIPPED | OUTPATIENT
Start: 2022-10-24

## 2022-10-24 RX ORDER — PANTOPRAZOLE SODIUM 40 MG/1
40 TABLET, DELAYED RELEASE ORAL DAILY
Qty: 30 TABLET | Refills: 5 | Status: SHIPPED | OUTPATIENT
Start: 2022-10-24

## 2022-10-24 RX ORDER — INSULIN GLARGINE-YFGN 100 [IU]/ML
INJECTION, SOLUTION SUBCUTANEOUS
Qty: 12 ML | Refills: 3 | Status: SHIPPED | OUTPATIENT
Start: 2022-10-24 | End: 2022-11-03

## 2022-10-24 RX ORDER — LISINOPRIL 20 MG/1
20 TABLET ORAL DAILY
Qty: 30 TABLET | Refills: 5 | Status: SHIPPED | OUTPATIENT
Start: 2022-10-24

## 2022-10-24 NOTE — PROGRESS NOTES
Chief Complaint   Patient presents with    Diabetes       1. \"Have you been to the ER, urgent care clinic since your last visit? Hospitalized since your last visit? \"   no  2. \"Have you seen or consulted any other health care providers outside of the [address] [place] [address] since your last visit? \"  no    3. For patients aged 39-70: Has the patient had a colonoscopy / FIT/ Cologuard?  No gap

## 2022-10-24 NOTE — PROGRESS NOTES
FEDERICO Guy 59 y.o. male  presents to the office today for a follow up on diabetes. Blood pressure 92/60, pulse 69, temperature 97.5 °F (36.4 °C), temperature source Temporal, resp. rate 16, height 5' 8\" (1.727 m), weight 255 lb (115.7 kg), SpO2 97 %. Body mass index is 38.77 kg/m². Chief Complaint   Patient presents with    Diabetes      Pt is compliant with all his medications. DM2: A1c per POC today 8.4, down from A1c of 9.2 on 22. Pt reports he has a harder time monitoring his diet in the summer and believes he can get his A1c down in the winter. Pt continues with Jardiance 25mg daily, Metformin 1000mg BID, Semglee 60 units daily, Trulicity 3.1HP weekly, and Actos 30mg daily. Hyperlipidemia: Lipid panel on 22 notable for total cholesterol 124, HDL 45, LDL 60.2, and triglycerides 94. Pt continues with Lipitor 40mg daily. Hypertension: BP at office today 92/60. Pt continues with Lisinopril 20mg daily. GERD: Pt continues with Protonix 40mg daily. Health Maintenance:   Pt due for flu vaccine-- administered today. Pt had a colonoscopy in Oct 2021 with Dr. Benito Ni. He will have repeat in . Pt notes his mother  of colon cancer. Current Outpatient Medications   Medication Sig Dispense Refill    dulaglutide (Trulicity) 4.5 ZS/3.6 mL pnij 4.5 mg by SubCUTAneous route every seven (7) days. 4 Each 2    insulin glargine-yfgn (Semglee,insulin glarg-yfgn,Pen) 100 unit/mL (3 mL) inpn INJECT 60 UNITS UNDER THE SKIN DAILY 12 mL 3    pioglitazone (ACTOS) 30 mg tablet Take 1 Tablet by mouth daily. 30 Tablet 5    pantoprazole (PROTONIX) 40 mg tablet Take 1 Tablet by mouth daily. 30 Tablet 5    metFORMIN (GLUCOPHAGE) 1,000 mg tablet Take 1 Tablet by mouth two (2) times daily (with meals). 60 Tablet 5    lisinopriL (PRINIVIL, ZESTRIL) 20 mg tablet Take 1 Tablet by mouth daily.  30 Tablet 5    empagliflozin (Jardiance) 25 mg tablet TAKE ONE TABLET BY MOUTH DAILY 30 Tablet 5 atorvastatin (LIPITOR) 40 mg tablet Take 1 Tablet by mouth daily. 30 Tablet 5    Insulin Needles, Disposable, (Stacy Pen Needle) 32 gauge x 5/32\" ndle USE ONCE DAILY. Please dispense whatever insurance covers 100 Pen Needle 11    glucose blood VI test strips (blood glucose test) strip Accu-Chek Comfort Curv,    Dx code 250.02    Test blood sugar twice daily or as directed by Dr. Boone Bhatia. 200 Strip 11    NOVOTWIST 32 gauge x 1/5\" ndle USE ONCE DAILY 100 Pen Needle 10    Lancets misc Accu-Chek Comfort Curv monitor   Test blood sugar twice a day or as directed by Dr. Boone Bhatia. 200 Each 11    Blood-Glucose Meter monitoring kit Check glucose daily in the Am  Dx 250.02 1 kit 0     Allergies   Allergen Reactions    Ppa/Pe/Phenyltolox/Cpm Td Unknown (comments)     Allergies:PPA     Past Medical History:   Diagnosis Date    Allergic rhinitis due to other allergen 3/5/2010    Colon polyp     Family history of colon cancer in mother     Hypertension     Mixed hyperlipidemia 3/5/2010    Obesity, unspecified 3/5/2010    PUD (peptic ulcer disease)     15 years ago    Pure hyperglyceridemia 3/5/2010    Type II or unspecified type diabetes mellitus without mention of complication, not stated as uncontrolled 3/5/2010     Past Surgical History:   Procedure Laterality Date    HX COLONOSCOPY  03/2016    Dr. Sander Rucker     Family History   Problem Relation Age of Onset    Cancer Mother     Heart Disease Father      Social History     Tobacco Use    Smoking status: Never    Smokeless tobacco: Never   Substance Use Topics    Alcohol use: Yes     Alcohol/week: 3.3 standard drinks     Types: 4 Standard drinks or equivalent per week        Review of Systems   Constitutional:  Negative for chills and fever. HENT:  Negative for hearing loss and tinnitus. Eyes:  Negative for blurred vision and double vision. Respiratory:  Negative for cough and shortness of breath. Cardiovascular:  Negative for chest pain and palpitations.    Gastrointestinal: Negative for nausea and vomiting. Genitourinary:  Negative for dysuria and frequency. Musculoskeletal:  Negative for back pain and falls. Skin:  Negative for itching and rash. Neurological:  Negative for dizziness, loss of consciousness and headaches. Endo/Heme/Allergies: Negative. Psychiatric/Behavioral:  Negative for depression. The patient is not nervous/anxious. Physical Exam  Vitals reviewed. Constitutional:       Appearance: Normal appearance. HENT:      Head: Normocephalic and atraumatic. Right Ear: Tympanic membrane, ear canal and external ear normal.      Left Ear: Tympanic membrane, ear canal and external ear normal.      Nose: Nose normal.      Mouth/Throat:      Mouth: Mucous membranes are moist.      Pharynx: Oropharynx is clear. Eyes:      Extraocular Movements: Extraocular movements intact. Conjunctiva/sclera: Conjunctivae normal.      Pupils: Pupils are equal, round, and reactive to light. Cardiovascular:      Rate and Rhythm: Normal rate and regular rhythm. Pulses: Normal pulses. Heart sounds: Normal heart sounds. Pulmonary:      Effort: Pulmonary effort is normal.      Breath sounds: Normal breath sounds. Abdominal:      General: Abdomen is flat. Bowel sounds are normal.      Palpations: Abdomen is soft. Musculoskeletal:         General: Normal range of motion. Cervical back: Normal range of motion and neck supple. Skin:     General: Skin is warm and dry. Neurological:      General: No focal deficit present. Mental Status: He is alert and oriented to person, place, and time. Psychiatric:         Mood and Affect: Mood normal.         Behavior: Behavior normal.         Judgment: Judgment normal.       ASSESSMENT and PLAN  Diagnoses and all orders for this visit:    1. Uncontrolled type 2 diabetes mellitus with hyperglycemia, with long-term current use of insulin ([address]  Pt will work on diet on exercise. Continue current regimen.  Pt requests a refill of their medication, which I have granted. -     AMB POC HEMOGLOBIN A1C  -     insulin glargine-yfgn (Semglee,insulin glarg-yfgn,Pen) 100 unit/mL (3 mL) inpn; INJECT 60 UNITS UNDER THE SKIN DAILY  -     pioglitazone (ACTOS) 30 mg tablet; Take 1 Tablet by mouth daily. -     metFORMIN (GLUCOPHAGE) 1,000 mg tablet; Take 1 Tablet by mouth two (2) times daily (with meals). -     empagliflozin (Jardiance) 25 mg tablet; TAKE ONE TABLET BY MOUTH DAILY    2. Uncontrolled type 2 diabetes mellitus with hypoglycemia, unspecified hypoglycemia coma status ([facility]  See #1.   -     dulaglutide (Trulicity) 4.5 NM/5.4 mL pnij; 4.5 mg by SubCUTAneous route every seven (7) days. 3. Gastroesophageal reflux disease without esophagitis  Continue current regimen. Pt requests a refill of their medication, which I have granted. -     pantoprazole (PROTONIX) 40 mg tablet; Take 1 Tablet by mouth daily. 4. Mixed hyperlipidemia  Continue current regimen. Pt requests a refill of their medication, which I have granted. -     lisinopriL (PRINIVIL, ZESTRIL) 20 mg tablet; Take 1 Tablet by mouth daily. -     atorvastatin (LIPITOR) 40 mg tablet; Take 1 Tablet by mouth daily. I encouraged pt to get the latest COVID booster. Pt would like to wait to get lab work in 3 months. Follow-up and Dispositions    Return in about 3 months (around 1/24/2023) for diabetes follow up. Medication risks/benefits/costs/interactions/alternatives discussed with patient. Advised patient to call back or return to office if symptoms worsen/change/persist.  If patient cannot reach us or should anything more severe/urgent arise he/she should proceed directly to the nearest emergency department. Discussed expected course/resolution/complications of diagnosis in detail with patient. Patient given a written after visit summary which includes diagnoses, current medications and vitals.   Patient expressed understanding with the diagnosis and plan. Written by hoa Stewart, as dictated by Jose Amos M.D.    8:30 AM - 8:50 AM    Total time spent with the patient 20 minutes, greater than 50% of time spent counseling patient.

## 2022-11-01 DIAGNOSIS — Z79.4 UNCONTROLLED TYPE 2 DIABETES MELLITUS WITH HYPERGLYCEMIA, WITH LONG-TERM CURRENT USE OF INSULIN (HCC): ICD-10-CM

## 2022-11-01 DIAGNOSIS — E11.65 UNCONTROLLED TYPE 2 DIABETES MELLITUS WITH HYPERGLYCEMIA, WITH LONG-TERM CURRENT USE OF INSULIN (HCC): ICD-10-CM

## 2022-11-03 RX ORDER — INSULIN GLARGINE-YFGN 100 [IU]/ML
INJECTION, SOLUTION SUBCUTANEOUS
Qty: 12 ML | Refills: 3 | Status: SHIPPED | OUTPATIENT
Start: 2022-11-03

## 2023-01-11 ENCOUNTER — DOCUMENTATION ONLY (OUTPATIENT)
Dept: FAMILY MEDICINE CLINIC | Age: 58
End: 2023-01-11

## 2023-03-05 DIAGNOSIS — K21.9 GASTROESOPHAGEAL REFLUX DISEASE WITHOUT ESOPHAGITIS: ICD-10-CM

## 2023-03-05 RX ORDER — PANTOPRAZOLE SODIUM 40 MG/1
TABLET, DELAYED RELEASE ORAL
Qty: 30 TABLET | Refills: 2 | Status: SHIPPED | OUTPATIENT
Start: 2023-03-05

## 2023-04-01 DIAGNOSIS — Z79.4 UNCONTROLLED TYPE 2 DIABETES MELLITUS WITH HYPERGLYCEMIA, WITH LONG-TERM CURRENT USE OF INSULIN (HCC): ICD-10-CM

## 2023-04-01 DIAGNOSIS — E11.65 UNCONTROLLED TYPE 2 DIABETES MELLITUS WITH HYPERGLYCEMIA, WITH LONG-TERM CURRENT USE OF INSULIN (HCC): ICD-10-CM

## 2023-04-02 RX ORDER — INSULIN GLARGINE-YFGN 100 [IU]/ML
INJECTION, SOLUTION SUBCUTANEOUS
Qty: 15 UNSPECIFIED | Refills: 3 | Status: SHIPPED | OUTPATIENT
Start: 2023-04-02

## 2023-04-24 ENCOUNTER — OFFICE VISIT (OUTPATIENT)
Dept: FAMILY MEDICINE CLINIC | Age: 58
End: 2023-04-24
Payer: COMMERCIAL

## 2023-04-24 VITALS
OXYGEN SATURATION: 97 % | RESPIRATION RATE: 16 BRPM | SYSTOLIC BLOOD PRESSURE: 120 MMHG | HEART RATE: 77 BPM | DIASTOLIC BLOOD PRESSURE: 70 MMHG | HEIGHT: 68 IN | WEIGHT: 252 LBS | TEMPERATURE: 98.1 F | BODY MASS INDEX: 38.19 KG/M2

## 2023-04-24 DIAGNOSIS — Z79.4 UNCONTROLLED TYPE 2 DIABETES MELLITUS WITH HYPERGLYCEMIA, WITH LONG-TERM CURRENT USE OF INSULIN (HCC): Primary | ICD-10-CM

## 2023-04-24 DIAGNOSIS — E66.01 SEVERE OBESITY (BMI 35.0-39.9) WITH COMORBIDITY (HCC): ICD-10-CM

## 2023-04-24 DIAGNOSIS — E78.2 MIXED HYPERLIPIDEMIA: ICD-10-CM

## 2023-04-24 DIAGNOSIS — I10 ESSENTIAL HYPERTENSION, BENIGN: ICD-10-CM

## 2023-04-24 DIAGNOSIS — E11.65 UNCONTROLLED TYPE 2 DIABETES MELLITUS WITH HYPERGLYCEMIA, WITH LONG-TERM CURRENT USE OF INSULIN (HCC): Primary | ICD-10-CM

## 2023-04-24 LAB — HBA1C MFR BLD HPLC: 8.9 %

## 2023-04-24 PROCEDURE — 3078F DIAST BP <80 MM HG: CPT | Performed by: FAMILY MEDICINE

## 2023-04-24 PROCEDURE — 3074F SYST BP LT 130 MM HG: CPT | Performed by: FAMILY MEDICINE

## 2023-04-24 PROCEDURE — 99214 OFFICE O/P EST MOD 30 MIN: CPT | Performed by: FAMILY MEDICINE

## 2023-04-24 PROCEDURE — 83036 HEMOGLOBIN GLYCOSYLATED A1C: CPT | Performed by: FAMILY MEDICINE

## 2023-04-24 PROCEDURE — 3052F HG A1C>EQUAL 8.0%<EQUAL 9.0%: CPT | Performed by: FAMILY MEDICINE

## 2023-04-24 RX ORDER — DULAGLUTIDE 4.5 MG/.5ML
4.5 INJECTION, SOLUTION SUBCUTANEOUS
Qty: 4 EACH | Refills: 2 | Status: SHIPPED | OUTPATIENT
Start: 2023-04-24

## 2023-04-24 NOTE — PROGRESS NOTES
HPI  Oneda Faroese 59 y.o. male  presents to the office today for follow up. Blood pressure 120/70, pulse 77, temperature 98.1 °F (36.7 °C), temperature source Temporal, resp. rate 16, height 5' 8\" (1.727 m), weight 252 lb (114.3 kg), SpO2 97 %. Body mass index is 38.32 kg/m². Chief Complaint   Patient presents with    Diabetes     DM2: A1c per POC today 9.0, up from A1c of 8.4 on 10/24/22. Pt is on Jardiance 25mg daily, Metformin 1000mg BID, Semglee 60 units daily, Trulicity 9.1GP weekly, and Actos 30mg daily. He reports he was out of Trulicity for several weeks due to the shortage. He restarted Trulicity about 3 weeks ago. Hyperlipidemia: Lipid panel on 6/20/22 notable for total cholesterol 124, HDL 45, LDL 60.2, and triglycerides 94. Pt continues with Lipitor 40mg daily. Hypertension: BP at office today 120/70. Pt continues with Lisinopril 20mg daily. Current Outpatient Medications   Medication Sig Dispense Refill    dulaglutide (Trulicity) 4.5 XA/3.9 mL pnij 4.5 mg by SubCUTAneous route every seven (7) days. 4 Each 2    Semglee,insulin glarg-yfgn,Pen 100 unit/mL (3 mL) inpn INJECT 60 UNITS UNDER THE SKIN ONCE DAILY 15 UNSPECIFIED 3    pantoprazole (PROTONIX) 40 mg tablet TAKE 1 TABLET BY MOUTH EVERY DAY 30 Tablet 2    pioglitazone (ACTOS) 30 mg tablet Take 1 Tablet by mouth daily. 30 Tablet 5    metFORMIN (GLUCOPHAGE) 1,000 mg tablet Take 1 Tablet by mouth two (2) times daily (with meals). 60 Tablet 5    lisinopriL (PRINIVIL, ZESTRIL) 20 mg tablet Take 1 Tablet by mouth daily. 30 Tablet 5    empagliflozin (Jardiance) 25 mg tablet TAKE ONE TABLET BY MOUTH DAILY 30 Tablet 5    atorvastatin (LIPITOR) 40 mg tablet Take 1 Tablet by mouth daily. 30 Tablet 5    Insulin Needles, Disposable, (Stacy Pen Needle) 32 gauge x 5/32\" ndle USE ONCE DAILY.  Please dispense whatever insurance covers 100 Pen Needle 11    glucose blood VI test strips (blood glucose test) strip Accu-Chek Comfort Curv,    Dx code 250.02    Test blood sugar twice daily or as directed by Dr. Ary Bartlett 200 Strip 11    NOVOTWIST 32 gauge x 1/5\" ndle USE ONCE DAILY 100 Pen Needle 10    Lancets misc Accu-Chek Comfort Curv monitor   Test blood sugar twice a day or as directed by Dr. Ary Heart. 200 Each 11    Blood-Glucose Meter monitoring kit Check glucose daily in the Am  Dx 250.02 1 kit 0     Allergies   Allergen Reactions    Ppa/Pe/Phenyltolox/Cpm Td Unknown (comments)     Allergies:PPA     Past Medical History:   Diagnosis Date    Allergic rhinitis due to other allergen 3/5/2010    Colon polyp     Family history of colon cancer in mother     Hypertension     Mixed hyperlipidemia 3/5/2010    Obesity, unspecified 3/5/2010    PUD (peptic ulcer disease)     15 years ago    Pure hyperglyceridemia 3/5/2010    Type II or unspecified type diabetes mellitus without mention of complication, not stated as uncontrolled 3/5/2010     Past Surgical History:   Procedure Laterality Date    HX COLONOSCOPY  03/2016    Dr. Lamin Hammond     Family History   Problem Relation Age of Onset    Cancer Mother     Heart Disease Father      Social History     Tobacco Use    Smoking status: Never    Smokeless tobacco: Never   Substance Use Topics    Alcohol use: Yes     Alcohol/week: 3.3 standard drinks     Types: 4 Standard drinks or equivalent per week        Review of Systems   Constitutional:  Negative for chills and fever. HENT:  Negative for hearing loss and tinnitus. Eyes:  Negative for blurred vision and double vision. Respiratory:  Negative for cough and shortness of breath. Cardiovascular:  Negative for chest pain and palpitations. Gastrointestinal:  Negative for nausea and vomiting. Genitourinary:  Negative for dysuria and frequency. Musculoskeletal:  Negative for back pain and falls. Skin:  Negative for itching and rash. Neurological:  Negative for dizziness, loss of consciousness and headaches. Endo/Heme/Allergies: Negative.     Psychiatric/Behavioral: Negative for depression. The patient is not nervous/anxious. Physical Exam  Vitals reviewed. Constitutional:       Appearance: Normal appearance. HENT:      Head: Normocephalic and atraumatic. Right Ear: Tympanic membrane, ear canal and external ear normal.      Left Ear: Tympanic membrane, ear canal and external ear normal.      Nose: Nose normal.      Mouth/Throat:      Mouth: Mucous membranes are moist.      Pharynx: Oropharynx is clear. Eyes:      Extraocular Movements: Extraocular movements intact. Conjunctiva/sclera: Conjunctivae normal.      Pupils: Pupils are equal, round, and reactive to light. Cardiovascular:      Rate and Rhythm: Normal rate and regular rhythm. Pulses: Normal pulses. Heart sounds: Normal heart sounds. Pulmonary:      Effort: Pulmonary effort is normal.      Breath sounds: Normal breath sounds. Abdominal:      General: Abdomen is flat. Bowel sounds are normal.      Palpations: Abdomen is soft. Musculoskeletal:         General: Normal range of motion. Cervical back: Normal range of motion and neck supple. Skin:     General: Skin is warm and dry. Neurological:      General: No focal deficit present. Mental Status: He is alert and oriented to person, place, and time. Psychiatric:         Mood and Affect: Mood normal.         Behavior: Behavior normal.         Judgment: Judgment normal.         ASSESSMENT and PLAN  Diagnoses and all orders for this visit:    1. Uncontrolled type 2 diabetes mellitus with hyperglycemia, with long-term current use of insulin ([facility])  A1c has increased. He was out of his Trulicity for several weeks and restarted about 3 weeks ago. He will continue with his current medications and work on his diet and exercise.  Recheck microalbumin.   -     AMB POC HEMOGLOBIN A1C  -     MICROALBUMIN, UR, RAND W/ MICROALB/CREAT RATIO; Future  -     dulaglutide (Trulicity) 4.5 GQ/6.0 mL pnij; 4.5 mg by SubCUTAneous route every seven (7) days. 2. Mixed hyperlipidemia  Continue current regimen. Pt will have updated lab work performed during today's OV.   -     LIPID PANEL; Future    3. Essential hypertension, benign  Controlled. Continue current regimen. Pt will have updated lab work performed during today's [address]; Future    4. Severe obesity (BMI 35.0-39. 9) with comorbidity ([address]  I have reviewed/discussed the above normal BMI with the patient. I have recommended the following interventions: dietary management education, guidance, and counseling, encourage exercise and monitor weight. Follow-up and Dispositions    Return in about 3 months (around 7/24/2023) for diabetes follow up. Medication risks/benefits/costs/interactions/alternatives discussed with patient. Advised patient to call back or return to office if symptoms worsen/change/persist.  If patient cannot reach us or should anything more severe/urgent arise he/she should proceed directly to the nearest emergency department. Discussed expected course/resolution/complications of diagnosis in detail with patient. Patient given a written after visit summary which includes diagnoses, current medications and vitals. Patient expressed understanding with the diagnosis and plan. Written by hoa Hutchinson, as dictated by Venkat Chambers M.D.    Devonte Arias PM - 4:22 PM    Total time spent with the patient 10 minutes, greater than 50% of time spent counseling patient.

## 2023-04-24 NOTE — PROGRESS NOTES
Chief Complaint   Patient presents with    Diabetes     1. \"Have you been to the ER, urgent care clinic since your last visit? Hospitalized since your last visit? \" no    2. \"Have you seen or consulted any other health care providers outside of the [address] [place] [address] since your last visit? \"  Derm     A1C

## 2023-04-25 LAB
ALBUMIN SERPL-MCNC: 4.3 G/DL (ref 3.5–5)
ALBUMIN/GLOB SERPL: 1.5 (ref 1.1–2.2)
ALP SERPL-CCNC: 66 U/L (ref 45–117)
ALT SERPL-CCNC: 29 U/L (ref 12–78)
ANION GAP SERPL CALC-SCNC: 5 MMOL/L (ref 5–15)
AST SERPL-CCNC: 17 U/L (ref 15–37)
BILIRUB SERPL-MCNC: 0.6 MG/DL (ref 0.2–1)
BUN SERPL-MCNC: 17 MG/DL (ref 6–20)
BUN/CREAT SERPL: 16 (ref 12–20)
CALCIUM SERPL-MCNC: 9.6 MG/DL (ref 8.5–10.1)
CHLORIDE SERPL-SCNC: 106 MMOL/L (ref 97–108)
CHOLEST SERPL-MCNC: 134 MG/DL
CO2 SERPL-SCNC: 27 MMOL/L (ref 21–32)
CREAT SERPL-MCNC: 1.04 MG/DL (ref 0.7–1.3)
CREAT UR-MCNC: 63.6 MG/DL
GLOBULIN SER CALC-MCNC: 2.8 G/DL (ref 2–4)
GLUCOSE SERPL-MCNC: 178 MG/DL (ref 65–100)
HDLC SERPL-MCNC: 40 MG/DL
HDLC SERPL: 3.4 (ref 0–5)
LDLC SERPL CALC-MCNC: 69.2 MG/DL (ref 0–100)
MICROALBUMIN UR-MCNC: 0.76 MG/DL
MICROALBUMIN/CREAT UR-RTO: 12 MG/G (ref 0–30)
POTASSIUM SERPL-SCNC: 4.3 MMOL/L (ref 3.5–5.1)
PROT SERPL-MCNC: 7.1 G/DL (ref 6.4–8.2)
SODIUM SERPL-SCNC: 138 MMOL/L (ref 136–145)
TRIGL SERPL-MCNC: 124 MG/DL (ref ?–150)
VLDLC SERPL CALC-MCNC: 24.8 MG/DL

## 2023-05-10 RX ORDER — DULAGLUTIDE 4.5 MG/.5ML
INJECTION, SOLUTION SUBCUTANEOUS
Qty: 6 ADJUSTABLE DOSE PRE-FILLED PEN SYRINGE | Refills: 5 | Status: SHIPPED | OUTPATIENT
Start: 2023-05-10

## 2023-06-19 DIAGNOSIS — K21.9 GASTRO-ESOPHAGEAL REFLUX DISEASE WITHOUT ESOPHAGITIS: ICD-10-CM

## 2023-06-20 RX ORDER — PANTOPRAZOLE SODIUM 40 MG/1
TABLET, DELAYED RELEASE ORAL
Qty: 90 TABLET | Refills: 1 | Status: SHIPPED | OUTPATIENT
Start: 2023-06-20

## 2023-06-28 DIAGNOSIS — E78.2 MIXED HYPERLIPIDEMIA: ICD-10-CM

## 2023-06-28 DIAGNOSIS — E11.65 TYPE 2 DIABETES MELLITUS WITH HYPERGLYCEMIA (HCC): ICD-10-CM

## 2023-06-28 NOTE — TELEPHONE ENCOUNTER
PCP: Paul Diggs MD    Last appt: 4/24/2023       Future Appointments   Date Time Provider [address]   8/14/2023  4:15 PM Camilo Glaser MD [facility]       Requested Prescriptions     Pending Prescriptions Disp Refills    pioglitazone (ACTOS) 30 MG tablet [Pharmacy Med Name: PIOGLITAZONE HCL 30 MG TABLET] 90 tablet      Sig: TAKE 1 TABLET BY MOUTH EVERY DAY    atorvastatin (LIPITOR) 40 MG tablet [Pharmacy Med Name: ATORVASTATIN 40 MG TABLET] 90 tablet      Sig: TAKE 1 TABLET BY MOUTH EVERY DAY    lisinopril (PRINIVIL;ZESTRIL) 20 MG tablet [Pharmacy Med Name: LISINOPRIL 20 MG TABLET] 90 tablet      Sig: TAKE 1 TABLET BY MOUTH EVERY DAY       Prior labs and Blood pressures:  BP Readings from Last 3 Encounters:   04/24/23 120/70   10/24/22 92/60   11/08/21 120/78     Lab Results   Component Value Date/Time     04/24/2023 04:29 PM    K 4.3 04/24/2023 04:29 PM     04/24/2023 04:29 PM    CO2 27 04/24/2023 04:29 PM    BUN 17 04/24/2023 04:29 PM    GFRAA >60 06/20/2022 12:00 PM     Lab Results   Component Value Date/Time    SGU7ABAQ 8.9 04/24/2023 04:24 PM     Lab Results   Component Value Date/Time    CHOL 134 04/24/2023 04:29 PM    HDL 40 04/24/2023 04:29 PM     No results found for: VITD3, VD3RIA    No results found for: TSH, TSH2, TSH3

## 2023-06-29 RX ORDER — PIOGLITAZONEHYDROCHLORIDE 30 MG/1
TABLET ORAL
Qty: 90 TABLET | Refills: 1 | Status: SHIPPED | OUTPATIENT
Start: 2023-06-29

## 2023-06-29 RX ORDER — ATORVASTATIN CALCIUM 40 MG/1
TABLET, FILM COATED ORAL
Qty: 90 TABLET | Refills: 1 | Status: SHIPPED | OUTPATIENT
Start: 2023-06-29

## 2023-06-29 RX ORDER — INSULIN GLARGINE-YFGN 100 [IU]/ML
60 INJECTION, SOLUTION SUBCUTANEOUS DAILY
Qty: 1800 ML | Refills: 1 | Status: SHIPPED | OUTPATIENT
Start: 2023-06-29 | End: 2023-09-27

## 2023-06-29 RX ORDER — LISINOPRIL 20 MG/1
TABLET ORAL
Qty: 90 TABLET | Refills: 1 | Status: SHIPPED | OUTPATIENT
Start: 2023-06-29

## 2023-07-27 RX ORDER — EMPAGLIFLOZIN 25 MG/1
TABLET, FILM COATED ORAL
Qty: 30 TABLET | Refills: 0 | Status: SHIPPED | OUTPATIENT
Start: 2023-07-27

## 2023-08-14 ENCOUNTER — OFFICE VISIT (OUTPATIENT)
Age: 58
End: 2023-08-14
Payer: COMMERCIAL

## 2023-08-14 VITALS
HEIGHT: 68 IN | WEIGHT: 265 LBS | SYSTOLIC BLOOD PRESSURE: 120 MMHG | DIASTOLIC BLOOD PRESSURE: 70 MMHG | BODY MASS INDEX: 40.16 KG/M2

## 2023-08-14 DIAGNOSIS — E78.2 MIXED HYPERLIPIDEMIA: ICD-10-CM

## 2023-08-14 DIAGNOSIS — E66.01 MORBID (SEVERE) OBESITY DUE TO EXCESS CALORIES (HCC): ICD-10-CM

## 2023-08-14 DIAGNOSIS — E11.65 UNCONTROLLED TYPE 2 DIABETES MELLITUS WITH HYPERGLYCEMIA (HCC): Primary | ICD-10-CM

## 2023-08-14 DIAGNOSIS — I10 ESSENTIAL (PRIMARY) HYPERTENSION: ICD-10-CM

## 2023-08-14 DIAGNOSIS — L30.9 ECZEMA, UNSPECIFIED TYPE: ICD-10-CM

## 2023-08-14 DIAGNOSIS — Z11.4 SCREENING FOR HIV WITHOUT PRESENCE OF RISK FACTORS: ICD-10-CM

## 2023-08-14 PROCEDURE — 99214 OFFICE O/P EST MOD 30 MIN: CPT | Performed by: FAMILY MEDICINE

## 2023-08-14 PROCEDURE — 3078F DIAST BP <80 MM HG: CPT | Performed by: FAMILY MEDICINE

## 2023-08-14 PROCEDURE — 3074F SYST BP LT 130 MM HG: CPT | Performed by: FAMILY MEDICINE

## 2023-08-14 SDOH — ECONOMIC STABILITY: HOUSING INSECURITY
IN THE LAST 12 MONTHS, WAS THERE A TIME WHEN YOU DID NOT HAVE A STEADY PLACE TO SLEEP OR SLEPT IN A SHELTER (INCLUDING NOW)?: NO

## 2023-08-14 SDOH — ECONOMIC STABILITY: FOOD INSECURITY: WITHIN THE PAST 12 MONTHS, THE FOOD YOU BOUGHT JUST DIDN'T LAST AND YOU DIDN'T HAVE MONEY TO GET MORE.: NEVER TRUE

## 2023-08-14 SDOH — ECONOMIC STABILITY: INCOME INSECURITY: HOW HARD IS IT FOR YOU TO PAY FOR THE VERY BASICS LIKE FOOD, HOUSING, MEDICAL CARE, AND HEATING?: NOT HARD AT ALL

## 2023-08-14 SDOH — ECONOMIC STABILITY: FOOD INSECURITY: WITHIN THE PAST 12 MONTHS, YOU WORRIED THAT YOUR FOOD WOULD RUN OUT BEFORE YOU GOT MONEY TO BUY MORE.: NEVER TRUE

## 2023-08-14 ASSESSMENT — PATIENT HEALTH QUESTIONNAIRE - PHQ9
SUM OF ALL RESPONSES TO PHQ QUESTIONS 1-9: 0
2. FEELING DOWN, DEPRESSED OR HOPELESS: 0
1. LITTLE INTEREST OR PLEASURE IN DOING THINGS: 0
SUM OF ALL RESPONSES TO PHQ QUESTIONS 1-9: 0
SUM OF ALL RESPONSES TO PHQ9 QUESTIONS 1 & 2: 0

## 2023-08-14 ASSESSMENT — ENCOUNTER SYMPTOMS
SHORTNESS OF BREATH: 0
BACK PAIN: 0
NAUSEA: 0
COUGH: 0
VOMITING: 0

## 2023-08-14 NOTE — PROGRESS NOTES
Chief Complaint   Patient presents with    Diabetes    Follow-up Chronic Condition     1. \"Have you been to the ER, urgent care clinic since your last visit? Hospitalized since your last visit?\"     2. \"Have you seen or consulted any other health care providers outside of the [address] [place] [address] since your last visit? \"      Foot exam

## 2023-08-23 NOTE — TELEPHONE ENCOUNTER
MD Argueta,    Patient call stating he checked w/insurance and Ozempic is in the plan. Requesting to be sent to [facility]. Assuming starting dose as 0.25mg? Entered 0.25mg q7d or change as appropriate. Thanks, francheska    Last appointment: 8/14/23  [place]  Next appointment: 12/11/23 Kend[place]  Previous refill encounter(s): None- New rx    Requested Prescriptions     Pending Prescriptions Disp Refills    Semaglutide,0.25 or 0.5MG/DOS, (OZEMPIC, 0.25 OR 0.5 MG/DOSE,) 2 MG/3ML SOPN 3 mL 0     Sig: Inject 0.25 mg into the skin every 7 days     For Pharmacy Admin Tracking Only    Program: Medication Refill  CPA in place:    Recommendation Provided To:    Intervention Detail: New Rx: 1, reason: Patient Preference  Intervention Accepted By:   Kimi Workman Closed?:    Time Spent (min): 10

## 2023-08-24 RX ORDER — SEMAGLUTIDE 0.68 MG/ML
0.25 INJECTION, SOLUTION SUBCUTANEOUS
Qty: 3 ML | Refills: 0 | Status: SHIPPED | OUTPATIENT
Start: 2023-08-24

## 2023-09-11 RX ORDER — EMPAGLIFLOZIN 25 MG/1
TABLET, FILM COATED ORAL
Qty: 90 TABLET | Refills: 1 | Status: SHIPPED | OUTPATIENT
Start: 2023-09-11

## 2023-09-11 NOTE — TELEPHONE ENCOUNTER
Patient LOV: 8/14/2023 with Dr. Sydnee Turner  Next OV: 12/11/2023 with Dr. Sydnee Turner  Please send to pharmacy on file.

## 2023-10-02 RX ORDER — SEMAGLUTIDE 0.68 MG/ML
0.25 INJECTION, SOLUTION SUBCUTANEOUS
Qty: 3 ML | Refills: 1 | Status: SHIPPED | OUTPATIENT
Start: 2023-10-02

## 2023-11-10 DIAGNOSIS — E11.65 TYPE 2 DIABETES MELLITUS WITH HYPERGLYCEMIA (HCC): ICD-10-CM

## 2023-11-10 RX ORDER — INSULIN GLARGINE-YFGN 100 [IU]/ML
INJECTION, SOLUTION SUBCUTANEOUS
Qty: 1800 ML | Refills: 1 | Status: SHIPPED | OUTPATIENT
Start: 2023-11-10

## 2023-11-14 RX ORDER — PIOGLITAZONEHYDROCHLORIDE 30 MG/1
TABLET ORAL
Qty: 90 TABLET | Refills: 1 | Status: SHIPPED | OUTPATIENT
Start: 2023-11-14

## 2023-12-11 ENCOUNTER — OFFICE VISIT (OUTPATIENT)
Age: 58
End: 2023-12-11
Payer: COMMERCIAL

## 2023-12-11 VITALS
WEIGHT: 265 LBS | HEIGHT: 68 IN | OXYGEN SATURATION: 98 % | TEMPERATURE: 97.8 F | HEART RATE: 74 BPM | DIASTOLIC BLOOD PRESSURE: 70 MMHG | BODY MASS INDEX: 40.16 KG/M2 | SYSTOLIC BLOOD PRESSURE: 130 MMHG | RESPIRATION RATE: 16 BRPM

## 2023-12-11 DIAGNOSIS — I10 ESSENTIAL (PRIMARY) HYPERTENSION: ICD-10-CM

## 2023-12-11 DIAGNOSIS — Z11.4 SCREENING FOR HIV WITHOUT PRESENCE OF RISK FACTORS: ICD-10-CM

## 2023-12-11 DIAGNOSIS — E78.2 MIXED HYPERLIPIDEMIA: ICD-10-CM

## 2023-12-11 DIAGNOSIS — E11.65 UNCONTROLLED TYPE 2 DIABETES MELLITUS WITH HYPERGLYCEMIA (HCC): Primary | ICD-10-CM

## 2023-12-11 LAB — HBA1C MFR BLD: 8.7 %

## 2023-12-11 PROCEDURE — 83036 HEMOGLOBIN GLYCOSYLATED A1C: CPT | Performed by: FAMILY MEDICINE

## 2023-12-11 PROCEDURE — 99214 OFFICE O/P EST MOD 30 MIN: CPT | Performed by: FAMILY MEDICINE

## 2023-12-11 PROCEDURE — 3074F SYST BP LT 130 MM HG: CPT | Performed by: FAMILY MEDICINE

## 2023-12-11 PROCEDURE — 3078F DIAST BP <80 MM HG: CPT | Performed by: FAMILY MEDICINE

## 2023-12-11 RX ORDER — SEMAGLUTIDE 0.68 MG/ML
0.5 INJECTION, SOLUTION SUBCUTANEOUS
Qty: 3 ML | Refills: 0 | Status: SHIPPED | OUTPATIENT
Start: 2023-12-11

## 2023-12-11 RX ORDER — SEMAGLUTIDE 1.34 MG/ML
1 INJECTION, SOLUTION SUBCUTANEOUS
Qty: 6 ML | Refills: 2 | Status: SHIPPED | OUTPATIENT
Start: 2024-01-11

## 2023-12-11 SDOH — ECONOMIC STABILITY: FOOD INSECURITY: WITHIN THE PAST 12 MONTHS, THE FOOD YOU BOUGHT JUST DIDN'T LAST AND YOU DIDN'T HAVE MONEY TO GET MORE.: NEVER TRUE

## 2023-12-11 SDOH — ECONOMIC STABILITY: INCOME INSECURITY: HOW HARD IS IT FOR YOU TO PAY FOR THE VERY BASICS LIKE FOOD, HOUSING, MEDICAL CARE, AND HEATING?: NOT HARD AT ALL

## 2023-12-11 SDOH — ECONOMIC STABILITY: FOOD INSECURITY: WITHIN THE PAST 12 MONTHS, YOU WORRIED THAT YOUR FOOD WOULD RUN OUT BEFORE YOU GOT MONEY TO BUY MORE.: NEVER TRUE

## 2023-12-11 ASSESSMENT — ENCOUNTER SYMPTOMS
SHORTNESS OF BREATH: 0
BACK PAIN: 0
COUGH: 0
NAUSEA: 0
VOMITING: 0

## 2023-12-11 ASSESSMENT — PATIENT HEALTH QUESTIONNAIRE - PHQ9
SUM OF ALL RESPONSES TO PHQ QUESTIONS 1-9: 0
SUM OF ALL RESPONSES TO PHQ QUESTIONS 1-9: 0
2. FEELING DOWN, DEPRESSED OR HOPELESS: 0
SUM OF ALL RESPONSES TO PHQ9 QUESTIONS 1 & 2: 0
1. LITTLE INTEREST OR PLEASURE IN DOING THINGS: 0
SUM OF ALL RESPONSES TO PHQ QUESTIONS 1-9: 0
SUM OF ALL RESPONSES TO PHQ QUESTIONS 1-9: 0

## 2023-12-11 NOTE — TELEPHONE ENCOUNTER
Patient LOV: 8/14/2023 with Dr. Argueta  Next OV: 12/11/2023 with Dr. Argueta  Please send to pharmacy on file.

## 2023-12-11 NOTE — TELEPHONE ENCOUNTER
LOV 5/2/2018  Has appointment 10/18/2018  Last refill 12/4/2017 Regarding: QH-V-17-abdominal pain/chest pain. chills, body aches, lethargic. shortness of breath when active.  ----- Message from Scarlett Rodriguez sent at 12/11/2023 10:39 AM CST -----  Patient Name: Nickie Cain    Specialist or PCP Name:  Judy Pablo     Symptoms: abdominal pain/chest pain. chills, body aches, lethargic. shortness of breath when active.    Pregnant (females aged 13-60. If Yes, how long?) : no    Call Back # : [phone]      Which State are you currently located in?: [place]    Name of Clinic Site / Acct# : [address], [place] [address]    Use following scripting for patients waiting for a callback:   \"Nurse callback times vary based on call volumes; please be aware the return phone call may come from an unidentified or out of state phone number. If your symptoms worsen or become life threatening while waiting, you should seek immediate assistance by calling 911 or going to the ER for evaluation.\"

## 2023-12-11 NOTE — PROGRESS NOTES
Chief Complaint   Patient presents with    Hypertension    Diabetes    Cholesterol Problem    Follow-up Chronic Condition     1. \"Have you been to the ER, urgent care clinic since your last visit? Hospitalized since your last visit? \" no    2. \"Have you seen or consulted any other health care providers outside of the [address] [place] [address] Avenue since your last visit? \"  Derm,     A1C 8.7    Eczema    Fall back in October hit ribs on side of toilet     Ribs hurt when he sleeps on his side   Numbness and tingling right pinky and   Forth finger
during today's OV.   -     Lipid Panel    Mixed hyperlipidemia  Presumed stable, will recheck labs today. Pt will continue current regimen. -     Comprehensive Metabolic Panel    Screening for HIV without presence of risk factors  Will screen for HIV.   -     HIV 1/2 Ag/Ab, 4TH Generation,W Rflx Confirm        Medication risks/benefits/costs/interactions/alternatives discussed with patient. Advised patient to call back or return to office if symptoms worsen/change/persist.  If patient cannot reach us or should anything more severe/urgent arise he/she should proceed directly to the nearest emergency department. Discussed expected course/resolution/complications of diagnosis in detail with patient. Patient given a written after visit summary which includes diagnoses, current medications and vitals. Patient expressed understanding with the diagnosis and plan. Written by berkley Chaves, as dictated by Silvia Johnson M.D.    4:55 PM - 5:09 PM    Total time spent with the patient 15 minutes, greater than 50% of time spent counseling patient.

## 2023-12-12 LAB
ALBUMIN SERPL-MCNC: 4.1 G/DL (ref 3.5–5)
ALBUMIN/GLOB SERPL: 1.5 (ref 1.1–2.2)
ALP SERPL-CCNC: 82 U/L (ref 45–117)
ALT SERPL-CCNC: 25 U/L (ref 12–78)
ANION GAP SERPL CALC-SCNC: 6 MMOL/L (ref 5–15)
AST SERPL-CCNC: 17 U/L (ref 15–37)
BILIRUB SERPL-MCNC: 0.8 MG/DL (ref 0.2–1)
BUN SERPL-MCNC: 15 MG/DL (ref 6–20)
BUN/CREAT SERPL: 15 (ref 12–20)
CALCIUM SERPL-MCNC: 9.1 MG/DL (ref 8.5–10.1)
CHLORIDE SERPL-SCNC: 106 MMOL/L (ref 97–108)
CHOLEST SERPL-MCNC: 132 MG/DL
CO2 SERPL-SCNC: 26 MMOL/L (ref 21–32)
CREAT SERPL-MCNC: 1.02 MG/DL (ref 0.7–1.3)
GLOBULIN SER CALC-MCNC: 2.8 G/DL (ref 2–4)
GLUCOSE SERPL-MCNC: 211 MG/DL (ref 65–100)
HDLC SERPL-MCNC: 44 MG/DL
HDLC SERPL: 3 (ref 0–5)
HIV 1+2 AB+HIV1 P24 AG SERPL QL IA: NONREACTIVE
HIV 1/2 RESULT COMMENT: NORMAL
LDLC SERPL CALC-MCNC: 64.2 MG/DL (ref 0–100)
POTASSIUM SERPL-SCNC: 4 MMOL/L (ref 3.5–5.1)
PROT SERPL-MCNC: 6.9 G/DL (ref 6.4–8.2)
SODIUM SERPL-SCNC: 138 MMOL/L (ref 136–145)
TRIGL SERPL-MCNC: 119 MG/DL
VLDLC SERPL CALC-MCNC: 23.8 MG/DL

## 2023-12-13 RX ORDER — SEMAGLUTIDE 0.68 MG/ML
0.25 INJECTION, SOLUTION SUBCUTANEOUS
Refills: 1 | OUTPATIENT
Start: 2023-12-13

## 2023-12-13 RX ORDER — ATORVASTATIN CALCIUM 40 MG/1
TABLET, FILM COATED ORAL
Qty: 90 TABLET | Refills: 1 | Status: SHIPPED | OUTPATIENT
Start: 2023-12-13

## 2023-12-13 NOTE — TELEPHONE ENCOUNTER
Last appointment: 12/11/23 [facility], labs completed 12/2023  Next appointment: 3/25/24 [facility]  Previous refill encounter(s): 6/29/23 90 + 1    Requested Prescriptions     Pending Prescriptions Disp Refills    atorvastatin (LIPITOR) 40 MG tablet [Pharmacy Med Name: ATORVASTATIN 40 MG TABLET] 90 tablet 1     Sig: TAKE 1 TABLET BY MOUTH EVERY DAY     For Pharmacy Admin Tracking Only    Program: Medication Refill  CPA in place:    Recommendation Provided To:    Intervention Detail: New Rx: 1, reason: Patient Preference  Intervention Accepted By:   Mancel Seats Closed?:    Time Spent (min): 5

## 2023-12-29 DIAGNOSIS — K21.9 GASTRO-ESOPHAGEAL REFLUX DISEASE WITHOUT ESOPHAGITIS: ICD-10-CM

## 2023-12-29 RX ORDER — PANTOPRAZOLE SODIUM 40 MG/1
TABLET, DELAYED RELEASE ORAL
Qty: 90 TABLET | Refills: 0 | Status: SHIPPED | OUTPATIENT
Start: 2023-12-29

## 2024-01-22 DIAGNOSIS — K21.9 GASTRO-ESOPHAGEAL REFLUX DISEASE WITHOUT ESOPHAGITIS: ICD-10-CM

## 2024-01-22 DIAGNOSIS — E78.2 MIXED HYPERLIPIDEMIA: ICD-10-CM

## 2024-01-23 DIAGNOSIS — E11.65 UNCONTROLLED TYPE 2 DIABETES MELLITUS WITH HYPERGLYCEMIA (HCC): ICD-10-CM

## 2024-01-24 RX ORDER — SEMAGLUTIDE 0.68 MG/ML
0.5 INJECTION, SOLUTION SUBCUTANEOUS
Qty: 4 ML | Refills: 2 | Status: SHIPPED | OUTPATIENT
Start: 2024-01-24

## 2024-01-24 RX ORDER — EMPAGLIFLOZIN 25 MG/1
TABLET, FILM COATED ORAL
Qty: 90 TABLET | Refills: 1 | Status: SHIPPED | OUTPATIENT
Start: 2024-01-24

## 2024-01-24 RX ORDER — PANTOPRAZOLE SODIUM 40 MG/1
TABLET, DELAYED RELEASE ORAL
Qty: 90 TABLET | Refills: 1 | Status: SHIPPED | OUTPATIENT
Start: 2024-01-24

## 2024-01-24 RX ORDER — LISINOPRIL 20 MG/1
TABLET ORAL
Qty: 90 TABLET | Refills: 1 | Status: SHIPPED | OUTPATIENT
Start: 2024-01-24

## 2024-02-01 NOTE — TELEPHONE ENCOUNTER
MD Argueta,    Patient calling for refill of Semglee.  Also requesting 90 d/s instead of 30 d/s.      Updated if appropriate.  Thanks, Odalis    Last appointment: 12/11/23 MD Argueta  Next appointment: 3/25/24 [facility]  Previous refill encounter(s): 11/10/23 1,800ml + 1    Requested Prescriptions     Pending Prescriptions Disp Refills    Insulin Glargine-yfgn (SEMGLEE, YFGN,) 100 UNIT/ML SOPN 5400 mL 1     Sig: Inject 60 Units into the skin daily     For Pharmacy Admin Tracking Only    Program: Medication Refill  CPA in place:    Recommendation Provided To:   Intervention Detail: New Rx: 1, reason: Patient Preference  Intervention Accepted By:   Gap Closed?:    Time Spent (min): 10

## 2024-02-02 RX ORDER — INSULIN GLARGINE-YFGN 100 [IU]/ML
60 INJECTION, SOLUTION SUBCUTANEOUS DAILY
Qty: 5400 ML | Refills: 1 | Status: SHIPPED | OUTPATIENT
Start: 2024-02-02

## 2024-04-13 DIAGNOSIS — E11.65 UNCONTROLLED TYPE 2 DIABETES MELLITUS WITH HYPERGLYCEMIA (HCC): ICD-10-CM

## 2024-04-16 NOTE — TELEPHONE ENCOUNTER
Last appointment: 12/11/23 [facility]  Next appointment: 4/29/24 [facility]  Previous refill encounter(s): 1/24/24 4ml (pen is 3ml)  + 2     Requested Prescriptions     Pending Prescriptions Disp Refills    OZEMPIC, 0.25 OR 0.5 MG/DOSE, 2 MG/3ML SOPN [Pharmacy Med Name: OZEMPIC 0.25-0.5 MG/DOSE PEN] 3 mL 2     Sig: INJECT 0.5 MG INTO THE SKIN EVERY 7 DAYS     For Pharmacy Admin Tracking Only    Program: Medication Refill  CPA in place:    Recommendation Provided To:   Intervention Detail: New Rx: 1, reason: Patient Preference  Intervention Accepted By:   Gap Closed?:    Time Spent (min): 5

## 2024-04-17 RX ORDER — SEMAGLUTIDE 0.68 MG/ML
0.5 INJECTION, SOLUTION SUBCUTANEOUS
Qty: 3 ML | Refills: 2 | Status: SHIPPED | OUTPATIENT
Start: 2024-04-17

## 2024-04-22 ENCOUNTER — OFFICE VISIT (OUTPATIENT)
Age: 59
End: 2024-04-22
Payer: COMMERCIAL

## 2024-04-22 VITALS
WEIGHT: 244 LBS | RESPIRATION RATE: 16 BRPM | TEMPERATURE: 98.5 F | DIASTOLIC BLOOD PRESSURE: 81 MMHG | HEART RATE: 90 BPM | SYSTOLIC BLOOD PRESSURE: 131 MMHG | HEIGHT: 68 IN | OXYGEN SATURATION: 98 % | BODY MASS INDEX: 36.98 KG/M2

## 2024-04-22 DIAGNOSIS — I10 ESSENTIAL (PRIMARY) HYPERTENSION: ICD-10-CM

## 2024-04-22 DIAGNOSIS — E78.2 MIXED HYPERLIPIDEMIA: ICD-10-CM

## 2024-04-22 DIAGNOSIS — E11.65 UNCONTROLLED TYPE 2 DIABETES MELLITUS WITH HYPERGLYCEMIA (HCC): Primary | ICD-10-CM

## 2024-04-22 LAB — HBA1C MFR BLD: 8 %

## 2024-04-22 PROCEDURE — 3075F SYST BP GE 130 - 139MM HG: CPT | Performed by: FAMILY MEDICINE

## 2024-04-22 PROCEDURE — 3079F DIAST BP 80-89 MM HG: CPT | Performed by: FAMILY MEDICINE

## 2024-04-22 PROCEDURE — 83036 HEMOGLOBIN GLYCOSYLATED A1C: CPT | Performed by: FAMILY MEDICINE

## 2024-04-22 PROCEDURE — 99214 OFFICE O/P EST MOD 30 MIN: CPT | Performed by: FAMILY MEDICINE

## 2024-04-22 SDOH — ECONOMIC STABILITY: FOOD INSECURITY: WITHIN THE PAST 12 MONTHS, YOU WORRIED THAT YOUR FOOD WOULD RUN OUT BEFORE YOU GOT MONEY TO BUY MORE.: NEVER TRUE

## 2024-04-22 SDOH — ECONOMIC STABILITY: FOOD INSECURITY: WITHIN THE PAST 12 MONTHS, THE FOOD YOU BOUGHT JUST DIDN'T LAST AND YOU DIDN'T HAVE MONEY TO GET MORE.: NEVER TRUE

## 2024-04-22 SDOH — ECONOMIC STABILITY: INCOME INSECURITY: HOW HARD IS IT FOR YOU TO PAY FOR THE VERY BASICS LIKE FOOD, HOUSING, MEDICAL CARE, AND HEATING?: NOT HARD AT ALL

## 2024-04-22 ASSESSMENT — PATIENT HEALTH QUESTIONNAIRE - PHQ9
SUM OF ALL RESPONSES TO PHQ QUESTIONS 1-9: 0
SUM OF ALL RESPONSES TO PHQ9 QUESTIONS 1 & 2: 0
SUM OF ALL RESPONSES TO PHQ QUESTIONS 1-9: 0
SUM OF ALL RESPONSES TO PHQ QUESTIONS 1-9: 0
2. FEELING DOWN, DEPRESSED OR HOPELESS: NOT AT ALL
1. LITTLE INTEREST OR PLEASURE IN DOING THINGS: NOT AT ALL
SUM OF ALL RESPONSES TO PHQ QUESTIONS 1-9: 0

## 2024-04-22 ASSESSMENT — ENCOUNTER SYMPTOMS
COUGH: 0
NAUSEA: 0
VOMITING: 0
BACK PAIN: 0
SHORTNESS OF BREATH: 0

## 2024-04-22 NOTE — PROGRESS NOTES
Chief Complaint   Patient presents with    Hypertension    Follow-up Chronic Condition    Gastroesophageal Reflux    Diabetes    Cholesterol Problem     \"Have you been to the ER, urgent care clinic since your last visit?  Hospitalized since your last visit?\"      no  “Have you seen or consulted any other health care providers outside of [facility] since your last visit?”        A1c    Eye exam - no         Click Here for Release of Records Request    
daily, Metformin 1000mg BID, and Actos 30mg daily. Pt will have updated lab work performed during today's OV.   -     AMB POC HEMOGLOBIN A1C  -     Microalbumin / creatinine urine ratio; Future  -     semaglutide, 2 MG/DOSE, (OZEMPIC) 8 MG/3ML SOPN sc injection; Inject 2 mg into the skin every 7 days  -     Comprehensive Metabolic Panel; Future    Mixed hyperlipidemia  Presumed stable, will recheck labs. Pt will continue current regimen.   -     Comprehensive Metabolic Panel; Future  -     Lipid Panel; Future    Essential (primary) hypertension  BP is stable. Pt will continue current regimen. Pt will have updated lab work performed during today's OV.   -     Comprehensive Metabolic Panel; Future    Follow up in August 2024.     Medication risks/benefits/costs/interactions/alternatives discussed with patient.  Advised patient to call back or return to office if symptoms worsen/change/persist.  If patient cannot reach us or should anything more severe/urgent arise he/she should proceed directly to the nearest emergency department.  Discussed expected course/resolution/complications of diagnosis in detail with patient.    Patient given a written after visit summary which includes diagnoses, current medications and vitals.  Patient expressed understanding with the diagnosis and plan.    Dinah BERGER, am scribing for and in the presence of José Miguel Argueta MD. 4/22/24/3:18 PM EDT    José Miguel BERGER MD, personally performed the services described in this documentation as scribed, in my presence, and it is both accurate and complete.       3:28 PM - 3:44 PM    Total time spent with the patient 15 minutes, greater than 50% of time spent counseling patient.

## 2024-04-23 LAB
ALBUMIN SERPL-MCNC: 4.2 G/DL (ref 3.5–5)
ALBUMIN/GLOB SERPL: 1.5 (ref 1.1–2.2)
ALP SERPL-CCNC: 67 U/L (ref 45–117)
ALT SERPL-CCNC: 24 U/L (ref 12–78)
ANION GAP SERPL CALC-SCNC: 5 MMOL/L (ref 5–15)
AST SERPL-CCNC: 14 U/L (ref 15–37)
BILIRUB SERPL-MCNC: 0.7 MG/DL (ref 0.2–1)
BUN SERPL-MCNC: 15 MG/DL (ref 6–20)
BUN/CREAT SERPL: 15 (ref 12–20)
CALCIUM SERPL-MCNC: 8.9 MG/DL (ref 8.5–10.1)
CHLORIDE SERPL-SCNC: 107 MMOL/L (ref 97–108)
CHOLEST SERPL-MCNC: 132 MG/DL
CO2 SERPL-SCNC: 26 MMOL/L (ref 21–32)
CREAT SERPL-MCNC: 0.99 MG/DL (ref 0.7–1.3)
GLOBULIN SER CALC-MCNC: 2.8 G/DL (ref 2–4)
GLUCOSE SERPL-MCNC: 174 MG/DL (ref 65–100)
HDLC SERPL-MCNC: 48 MG/DL
HDLC SERPL: 2.8 (ref 0–5)
LDLC SERPL CALC-MCNC: 61.8 MG/DL (ref 0–100)
POTASSIUM SERPL-SCNC: 4.2 MMOL/L (ref 3.5–5.1)
PROT SERPL-MCNC: 7 G/DL (ref 6.4–8.2)
SODIUM SERPL-SCNC: 138 MMOL/L (ref 136–145)
TRIGL SERPL-MCNC: 111 MG/DL
VLDLC SERPL CALC-MCNC: 22.2 MG/DL

## 2024-04-27 NOTE — RESULT ENCOUNTER NOTE
Labs are looking good please lets work on the glucose in diet and lets see how you do with increased dosage of medication we increased.  Your cholesterol panel looks good

## 2024-05-10 DIAGNOSIS — E11.65 TYPE 2 DIABETES MELLITUS WITH HYPERGLYCEMIA (HCC): ICD-10-CM

## 2024-05-13 RX ORDER — PIOGLITAZONEHYDROCHLORIDE 30 MG/1
TABLET ORAL
Qty: 100 TABLET | Refills: 1 | Status: SHIPPED | OUTPATIENT
Start: 2024-05-13

## 2024-06-07 RX ORDER — SEMAGLUTIDE 1.34 MG/ML
1 INJECTION, SOLUTION SUBCUTANEOUS
Refills: 1 | OUTPATIENT
Start: 2024-06-07

## 2024-06-07 NOTE — TELEPHONE ENCOUNTER
Request for ozempic 1mg.  Noted patient has new rx sent on 4/22/24 for the 2mg dose for 3ml + 2 refills.      Refused as dose changed.  Thanks, Odalis    For Pharmacy Admin Tracking Only    Program: Medication Refill  CPA in place:    Recommendation Provided To:   Intervention Detail: Discontinued Rx: 1, reason: Duplicate Therapy  Intervention Accepted By:   Gap Closed?:    Time Spent (min): 5

## 2024-07-19 DIAGNOSIS — E78.2 MIXED HYPERLIPIDEMIA: ICD-10-CM

## 2024-07-19 RX ORDER — ATORVASTATIN CALCIUM 40 MG/1
TABLET, FILM COATED ORAL
Qty: 90 TABLET | Refills: 1 | Status: SHIPPED | OUTPATIENT
Start: 2024-07-19

## 2024-07-22 DIAGNOSIS — E11.65 UNCONTROLLED TYPE 2 DIABETES MELLITUS WITH HYPERGLYCEMIA (HCC): ICD-10-CM

## 2024-07-22 RX ORDER — SEMAGLUTIDE 0.68 MG/ML
0.5 INJECTION, SOLUTION SUBCUTANEOUS
OUTPATIENT
Start: 2024-07-22

## 2024-07-22 NOTE — TELEPHONE ENCOUNTER
Patient calling for refill Ozempic.  Stated has been out x 1 week.  To [facility]. Thanks, francheska    Last appointment: 4/22/24 Kend[facility]  Next appointment: 8/26/24 [facility]  Previous refill encounter(s): 4/22/24 3ml + 2    Requested Prescriptions     Pending Prescriptions Disp Refills    semaglutide, 2 MG/DOSE, (OZEMPIC) 8 MG/3ML SOPN sc injection 3 mL 2     Sig: Inject 2 mg into the skin every 7 days     For Pharmacy Admin Tracking Only    Program: Medication Refill  CPA in place:    Recommendation Provided To:   Intervention Detail: New Rx: 1, reason: Patient Preference  Intervention Accepted By:   Gap Closed?:    Time Spent (min): 5     calm

## 2024-08-05 RX ORDER — SEMAGLUTIDE 1.34 MG/ML
1 INJECTION, SOLUTION SUBCUTANEOUS
Refills: 1 | OUTPATIENT
Start: 2024-08-05

## 2024-08-05 NOTE — TELEPHONE ENCOUNTER
Lov 04/22/2024  F/u 08/26/2024    Patient should have a refill at pharmacy -   Needs to contact pharmacy

## 2024-08-13 ENCOUNTER — TELEPHONE (OUTPATIENT)
Age: 59
End: 2024-08-13

## 2024-08-13 NOTE — TELEPHONE ENCOUNTER
----- Message from Martha MUNROE sent at 8/13/2024  9:44 AM EDT -----  Regarding: [facility] Appointment Request  E[facility] Appointment Request    Patient needs appointment for E[facility] Appointment Type: Existing Condition Follow Up/4 months follow up     Patient Requested Dates(s):       August 19, 2024  Patient Requested Time:             Anytime would be fine   Provider Name:                           José Miguel Farrar MD    Reason for Appointment Request: Established Patient - Available appointments did not meet patient need  --------------------------------------------------------------------------------------------------------------------------    Relationship to Patient: Self     Call Back Information: OK to leave message on voicemail  Preferred Call Back Number: [phone]

## 2024-08-19 ASSESSMENT — ENCOUNTER SYMPTOMS
SHORTNESS OF BREATH: 0
ABDOMINAL PAIN: 0

## 2024-08-20 ENCOUNTER — OFFICE VISIT (OUTPATIENT)
Age: 59
End: 2024-08-20
Payer: COMMERCIAL

## 2024-08-20 VITALS
OXYGEN SATURATION: 97 % | TEMPERATURE: 97.7 F | WEIGHT: 241 LBS | BODY MASS INDEX: 36.53 KG/M2 | HEIGHT: 68 IN | SYSTOLIC BLOOD PRESSURE: 117 MMHG | DIASTOLIC BLOOD PRESSURE: 78 MMHG | HEART RATE: 73 BPM | RESPIRATION RATE: 16 BRPM

## 2024-08-20 DIAGNOSIS — E11.65 UNCONTROLLED TYPE 2 DIABETES MELLITUS WITH HYPERGLYCEMIA, WITH LONG-TERM CURRENT USE OF INSULIN (HCC): Primary | ICD-10-CM

## 2024-08-20 DIAGNOSIS — K21.9 GASTRO-ESOPHAGEAL REFLUX DISEASE WITHOUT ESOPHAGITIS: ICD-10-CM

## 2024-08-20 DIAGNOSIS — E78.2 MIXED HYPERLIPIDEMIA: ICD-10-CM

## 2024-08-20 DIAGNOSIS — I10 ESSENTIAL (PRIMARY) HYPERTENSION: ICD-10-CM

## 2024-08-20 DIAGNOSIS — Z79.4 UNCONTROLLED TYPE 2 DIABETES MELLITUS WITH HYPERGLYCEMIA, WITH LONG-TERM CURRENT USE OF INSULIN (HCC): Primary | ICD-10-CM

## 2024-08-20 LAB — HBA1C MFR BLD: 7.9 %

## 2024-08-20 PROCEDURE — 83036 HEMOGLOBIN GLYCOSYLATED A1C: CPT | Performed by: FAMILY MEDICINE

## 2024-08-20 PROCEDURE — 3074F SYST BP LT 130 MM HG: CPT | Performed by: FAMILY MEDICINE

## 2024-08-20 PROCEDURE — 3078F DIAST BP <80 MM HG: CPT | Performed by: FAMILY MEDICINE

## 2024-08-20 PROCEDURE — 99214 OFFICE O/P EST MOD 30 MIN: CPT | Performed by: FAMILY MEDICINE

## 2024-08-20 SDOH — ECONOMIC STABILITY: FOOD INSECURITY: WITHIN THE PAST 12 MONTHS, YOU WORRIED THAT YOUR FOOD WOULD RUN OUT BEFORE YOU GOT MONEY TO BUY MORE.: NEVER TRUE

## 2024-08-20 SDOH — ECONOMIC STABILITY: INCOME INSECURITY: HOW HARD IS IT FOR YOU TO PAY FOR THE VERY BASICS LIKE FOOD, HOUSING, MEDICAL CARE, AND HEATING?: NOT HARD AT ALL

## 2024-08-20 SDOH — ECONOMIC STABILITY: FOOD INSECURITY: WITHIN THE PAST 12 MONTHS, THE FOOD YOU BOUGHT JUST DIDN'T LAST AND YOU DIDN'T HAVE MONEY TO GET MORE.: NEVER TRUE

## 2024-08-20 ASSESSMENT — ENCOUNTER SYMPTOMS
VOMITING: 0
NAUSEA: 0
SHORTNESS OF BREATH: 0
BACK PAIN: 0
COUGH: 0

## 2024-08-20 ASSESSMENT — PATIENT HEALTH QUESTIONNAIRE - PHQ9
SUM OF ALL RESPONSES TO PHQ QUESTIONS 1-9: 0
1. LITTLE INTEREST OR PLEASURE IN DOING THINGS: NOT AT ALL
SUM OF ALL RESPONSES TO PHQ QUESTIONS 1-9: 0
SUM OF ALL RESPONSES TO PHQ9 QUESTIONS 1 & 2: 0
2. FEELING DOWN, DEPRESSED OR HOPELESS: NOT AT ALL
SUM OF ALL RESPONSES TO PHQ QUESTIONS 1-9: 0
SUM OF ALL RESPONSES TO PHQ QUESTIONS 1-9: 0

## 2024-08-20 NOTE — PATIENT INSTRUCTIONS
dose to 13 units, which is 3 units more than the previous day.  You keep checking you morning blood sugar everyday but don’t make any additional increases unless it greater than 140 for 2 days in a row.

## 2024-08-20 NOTE — PROGRESS NOTES
eJsus Mackay 58 y.o. male  presents to the office today ***    There were no vitals taken for this visit. There is no height or weight on file to calculate BMI. No chief complaint on file.       History of Present Illness        Current Outpatient Medications   Medication Sig Dispense Refill    semaglutide, 2 MG/DOSE, (OZEMPIC) 8 MG/3ML SOPN sc injection Inject 2 mg into the skin every 7 days 3 mL 2    atorvastatin (LIPITOR) 40 MG tablet TAKE 1 TABLET BY MOUTH EVERY DAY 90 tablet 1    metFORMIN (GLUCOPHAGE) 1000 MG tablet TAKE 1 TABLET BY MOUTH TWICE A DAY WITH FOOD 180 tablet 1    pioglitazone (ACTOS) 30 MG tablet TAKE 1 TABLET BY MOUTH EVERY  tablet 1    Insulin Glargine-yfgn (SEMGLEE, YFGN,) 100 UNIT/ML SOPN Inject 60 Units into the skin daily 5400 mL 1    pantoprazole (PROTONIX) 40 MG tablet TAKE 1 TABLET BY MOUTH EVERY DAY 90 tablet 1    JARDIANCE 25 MG tablet TAKE 1 TABLET BY MOUTH EVERY DAY 90 tablet 1    lisinopril (PRINIVIL;ZESTRIL) 20 MG tablet TAKE 1 TABLET BY MOUTH EVERY DAY 90 tablet 1    Insulin Pen Needle 32G X 4 MM MISC 1 each by Does not apply route daily 100 each 5    Lancets MISC Accu-Chek Comfort Curv monitor   Test blood sugar twice a day or as directed by Dr. Argueta.       No current facility-administered medications for this visit.     No Known Allergies  Past Medical History:   Diagnosis Date    Allergic rhinitis due to other allergen 3/5/2010    Colon polyp     Erectile dysfunction [id number]    Family history of colon cancer in mother     Hypertension     Mixed hyperlipidemia 3/5/2010    Obesity, unspecified 3/5/2010    PUD (peptic ulcer disease)     15 years ago    Pure hyperglyceridemia 3/5/2010    Type II or unspecified type diabetes mellitus without mention of complication, not stated as uncontrolled 3/5/2010     Past Surgical History:   Procedure Laterality Date    COLONOSCOPY  03/2016    Dr. Salcido     Family History   Problem Relation Age of Onset    Cancer Mother     
Chief Complaint   Patient presents with    Diabetes     \"Have you been to the ER, urgent care clinic since your last visit?  Hospitalized since your last visit?\"      no  “Have you seen or consulted any other health care providers outside of [facility] since your last visit?”    Derm - skin check     A1c    Foot exam         Click Here for Release of Records Request    
not drinking alcohol and start checking his morning fasting BG levels. If his BG levels are greater than 140 for 2 days in a row, then he should add 3 units of Semglee. If they are below 60, then he should decrease Semglee by 2 units. I performed a diabetic foot exam. Pt will have updated lab work performed.    2. Mixed hyperlipidemia  -     Comprehensive Metabolic Panel; Future  -     Lipid Panel; Future  Lipid panel on 04/22/2024 notable for total cholesterol 132, HDL 48, LDL 61.8, and triglycerides 111. Pt continues with atorvastatin 40mg daily. Pt will have updated lab work performed.    3. Essential (primary) hypertension  -     Comprehensive Metabolic Panel; Future  BP at office today 117/78. Pt continues with lisinopril 20mg daily. Pt will have updated lab work performed during today's OV.        No follow-ups on file.          Medication risks/benefits/costs/interactions/alternatives discussed with patient.  Advised patient to call back or return to office if symptoms worsen/change/persist.  If patient cannot reach us or should anything more severe/urgent arise he/she should proceed directly to the nearest emergency department.  Discussed expected course/resolution/complications of diagnosis in detail with patient.    Patient given a written after visit summary which includes diagnoses, current medications and vitals.  Patient expressed understanding with the diagnosis and plan.    IVidal, am scribing for and in the presence of José Miguel Argueta MD. 8/20/24/11:52 AM EDT      11:52 AM - 12:03 PM    Total time spent with the patient 9 minutes, greater than 50% of time spent counseling patient.

## 2024-08-21 RX ORDER — SEMAGLUTIDE 1.34 MG/ML
1 INJECTION, SOLUTION SUBCUTANEOUS
Refills: 1 | OUTPATIENT
Start: 2024-08-21

## 2024-08-21 NOTE — TELEPHONE ENCOUNTER
Last appointment: 8/20/24 [facility]  Next appointment: 12/16/24 [facility]  Previous refill encounter(s): 1/24/24 90 + 1  Ozempic changed to 2mg dose and sent on 7/23/24 for 3ml + 2 refills-refused 1mg- as dose changed     Nadia  Odalis     Requested Prescriptions     Pending Prescriptions Disp Refills    pantoprazole (PROTONIX) 40 MG tablet [Pharmacy Med Name: PANTOPRAZOLE SOD DR 40 MG TAB] 90 tablet 1     Sig: Take 1 tablet by mouth daily     Refused Prescriptions Disp Refills    OZEMPIC, 1 MG/DOSE, 4 MG/3ML SOPN sc injection [Pharmacy Med Name: OZEMPIC 4 MG/3 ML (1 MG/DOSE)]  1     Sig: INJECT 1 MG INTO THE SKIN EVERY 7 DAYS     For Pharmacy Admin Tracking Only    Program: Medication Refill  CPA in place:    Recommendation Provided To:   Intervention Detail: Discontinued Rx: 1, reason: Therapy Complete and New Rx: 1, reason: Patient Preference  Intervention Accepted By:   Gap Closed?:    Time Spent (min): 5

## 2024-08-23 RX ORDER — PANTOPRAZOLE SODIUM 40 MG/1
40 TABLET, DELAYED RELEASE ORAL DAILY
Qty: 90 TABLET | Refills: 1 | Status: SHIPPED | OUTPATIENT
Start: 2024-08-23

## 2024-08-29 NOTE — TELEPHONE ENCOUNTER
Patient called to get this refill of Jardiance.  Thanks, Odalis    Last appointment: 8/20/24 [facility]  Next appointment: 12/16/24 [facility]  Previous refill encounter(s): 1/24/24 90 + 1    Requested Prescriptions     Pending Prescriptions Disp Refills    empagliflozin (JARDIANCE) 25 MG tablet [Pharmacy Med Name: JARDIANCE 25 MG TABLET] 90 tablet 1     Sig: Take 1 tablet by mouth daily     For Pharmacy Admin Tracking Only    Program: Medication Refill  CPA in place:    Recommendation Provided To:   Intervention Detail: New Rx: 1, reason: Patient Preference  Intervention Accepted By:   Gap Closed?:    Time Spent (min): 5

## 2024-09-08 DIAGNOSIS — E78.2 MIXED HYPERLIPIDEMIA: ICD-10-CM

## 2024-09-12 RX ORDER — LISINOPRIL 20 MG/1
20 TABLET ORAL DAILY
Qty: 90 TABLET | Refills: 1 | Status: SHIPPED | OUTPATIENT
Start: 2024-09-12

## 2024-09-14 DIAGNOSIS — E11.65 UNCONTROLLED TYPE 2 DIABETES MELLITUS WITH HYPERGLYCEMIA (HCC): ICD-10-CM

## 2024-09-14 DIAGNOSIS — E11.65 TYPE 2 DIABETES MELLITUS WITH HYPERGLYCEMIA (HCC): ICD-10-CM

## 2024-09-18 RX ORDER — SEMAGLUTIDE 1.34 MG/ML
1 INJECTION, SOLUTION SUBCUTANEOUS
Qty: 6 ADJUSTABLE DOSE PRE-FILLED PEN SYRINGE | Refills: 1 | Status: SHIPPED | OUTPATIENT
Start: 2024-09-18 | End: 2024-09-18

## 2024-09-18 RX ORDER — PIOGLITAZONEHYDROCHLORIDE 30 MG/1
TABLET ORAL
Qty: 90 TABLET | Refills: 1 | Status: SHIPPED | OUTPATIENT
Start: 2024-09-18

## 2024-12-16 ENCOUNTER — OFFICE VISIT (OUTPATIENT)
Age: 59
End: 2024-12-16
Payer: COMMERCIAL

## 2024-12-16 VITALS
RESPIRATION RATE: 16 BRPM | WEIGHT: 242 LBS | SYSTOLIC BLOOD PRESSURE: 112 MMHG | TEMPERATURE: 97.7 F | OXYGEN SATURATION: 97 % | HEART RATE: 87 BPM | BODY MASS INDEX: 36.68 KG/M2 | HEIGHT: 68 IN | DIASTOLIC BLOOD PRESSURE: 72 MMHG

## 2024-12-16 DIAGNOSIS — Z12.5 SCREENING PSA (PROSTATE SPECIFIC ANTIGEN): ICD-10-CM

## 2024-12-16 DIAGNOSIS — I10 ESSENTIAL (PRIMARY) HYPERTENSION: ICD-10-CM

## 2024-12-16 DIAGNOSIS — E66.01 SEVERE OBESITY (BMI 35.0-39.9) WITH COMORBIDITY: ICD-10-CM

## 2024-12-16 DIAGNOSIS — E11.65 UNCONTROLLED TYPE 2 DIABETES MELLITUS WITH HYPERGLYCEMIA, WITH LONG-TERM CURRENT USE OF INSULIN (HCC): ICD-10-CM

## 2024-12-16 DIAGNOSIS — E78.2 MIXED HYPERLIPIDEMIA: ICD-10-CM

## 2024-12-16 DIAGNOSIS — Z79.4 UNCONTROLLED TYPE 2 DIABETES MELLITUS WITH HYPERGLYCEMIA, WITH LONG-TERM CURRENT USE OF INSULIN (HCC): ICD-10-CM

## 2024-12-16 DIAGNOSIS — Z23 FLU VACCINE NEED: Primary | ICD-10-CM

## 2024-12-16 LAB — HBA1C MFR BLD: 8.7 %

## 2024-12-16 PROCEDURE — 99214 OFFICE O/P EST MOD 30 MIN: CPT | Performed by: FAMILY MEDICINE

## 2024-12-16 PROCEDURE — 3074F SYST BP LT 130 MM HG: CPT | Performed by: FAMILY MEDICINE

## 2024-12-16 PROCEDURE — 83036 HEMOGLOBIN GLYCOSYLATED A1C: CPT | Performed by: FAMILY MEDICINE

## 2024-12-16 PROCEDURE — 3078F DIAST BP <80 MM HG: CPT | Performed by: FAMILY MEDICINE

## 2024-12-16 SDOH — ECONOMIC STABILITY: FOOD INSECURITY: WITHIN THE PAST 12 MONTHS, THE FOOD YOU BOUGHT JUST DIDN'T LAST AND YOU DIDN'T HAVE MONEY TO GET MORE.: NEVER TRUE

## 2024-12-16 SDOH — ECONOMIC STABILITY: FOOD INSECURITY: WITHIN THE PAST 12 MONTHS, YOU WORRIED THAT YOUR FOOD WOULD RUN OUT BEFORE YOU GOT MONEY TO BUY MORE.: NEVER TRUE

## 2024-12-16 SDOH — ECONOMIC STABILITY: INCOME INSECURITY: HOW HARD IS IT FOR YOU TO PAY FOR THE VERY BASICS LIKE FOOD, HOUSING, MEDICAL CARE, AND HEATING?: NOT HARD AT ALL

## 2024-12-16 ASSESSMENT — PATIENT HEALTH QUESTIONNAIRE - PHQ9
SUM OF ALL RESPONSES TO PHQ QUESTIONS 1-9: 0
SUM OF ALL RESPONSES TO PHQ QUESTIONS 1-9: 0
2. FEELING DOWN, DEPRESSED OR HOPELESS: NOT AT ALL
SUM OF ALL RESPONSES TO PHQ9 QUESTIONS 1 & 2: 0
SUM OF ALL RESPONSES TO PHQ QUESTIONS 1-9: 0
SUM OF ALL RESPONSES TO PHQ QUESTIONS 1-9: 0
1. LITTLE INTEREST OR PLEASURE IN DOING THINGS: NOT AT ALL

## 2024-12-16 ASSESSMENT — ENCOUNTER SYMPTOMS
SHORTNESS OF BREATH: 0
VOMITING: 0
NAUSEA: 0
BACK PAIN: 0
COUGH: 0

## 2024-12-16 NOTE — PROGRESS NOTES
Chief Complaint   Patient presents with    Diabetes     \"Have you been to the ER, urgent care clinic since your last visit?  Hospitalized since your last visit?\"    no    “Have you seen or consulted any other health care providers outside our system since your last visit?”      no    “Have you had a colorectal cancer screening such as a colonoscopy/FIT/Cologuard?      Has it scheduled - [facility]  Date of last Colonoscopy: 10/18/2021  No cologuard on file  No FIT/FOBT on file   No flexible sigmoidoscopy on file     “Have you had a diabetic eye exam?”     Last year - [facility] in [place]     Date of last diabetic eye exam: 12/2/2022   A1c

## 2024-12-16 NOTE — PROGRESS NOTES
HPI  Jesus ARMENDARIZ Codie 59 y.o. male  presents to the office today for follow up on chronic conditions.     Blood pressure 112/72, pulse 87, temperature 97.7 °F (36.5 °C), temperature source Skin, resp. rate 16, height 1.727 m (5' 8\"), weight 109.8 kg (242 lb), SpO2 97%. Body mass index is 36.8 kg/m².   Chief Complaint   Patient presents with    Diabetes      DM2: A1c per POC today 8.7, down from A1c of 7.9 on 8/20/24. He reports his diet hasn't been good lately. He has been eating more sugar. He is staying his new home while it is being renovated and reports he keeps too many treats around. He normally has his wife around who helps him be better with his diet. He hasn't been checking glucose lately and is not interested in using continuous glucose monitor. Pt continues with Ozempic 2mg weekly, Semglee 60 units daily, Jardiance 25mg daily, Metformin 1000mg BID, and Actos 30mg daily.     Hyperlipidemia: Lipid panel on 4/22/24 notable for total cholesterol 132, HDL 48, LDL 61.8, and triglycerides 111. Pt continues with atorvastatin 40mg daily.      Hypertension: BP at office today 112/72. Pt continues with lisinopril 20mg daily.     Health Maintenance:   Colonoscopy: scheduled with Dr. Rowe     Current Outpatient Medications   Medication Sig Dispense Refill    pioglitazone (ACTOS) 30 MG tablet TAKE 1 TABLET BY MOUTH EVERY DAY 90 tablet 1    semaglutide, 2 MG/DOSE, (OZEMPIC) 8 MG/3ML SOPN sc injection Inject 2 mg into the skin every 7 days 4 mL 2    lisinopril (PRINIVIL;ZESTRIL) 20 MG tablet Take 1 tablet by mouth daily 90 tablet 1    empagliflozin (JARDIANCE) 25 MG tablet Take 1 tablet by mouth daily 90 tablet 1    pantoprazole (PROTONIX) 40 MG tablet Take 1 tablet by mouth daily 90 tablet 1    atorvastatin (LIPITOR) 40 MG tablet TAKE 1 TABLET BY MOUTH EVERY DAY 90 tablet 1    metFORMIN (GLUCOPHAGE) 1000 MG tablet TAKE 1 TABLET BY MOUTH TWICE A DAY WITH FOOD 180 tablet 1    Insulin Glargine-yfgn (SEMGLEE, YFGN,) 100

## 2024-12-17 LAB
ALBUMIN SERPL-MCNC: 4.2 G/DL (ref 3.5–5)
ALBUMIN/GLOB SERPL: 1.5 (ref 1.1–2.2)
ALP SERPL-CCNC: 62 U/L (ref 45–117)
ALT SERPL-CCNC: 24 U/L (ref 12–78)
ANION GAP SERPL CALC-SCNC: 8 MMOL/L (ref 2–12)
AST SERPL-CCNC: 15 U/L (ref 15–37)
BILIRUB SERPL-MCNC: 0.8 MG/DL (ref 0.2–1)
BUN SERPL-MCNC: 17 MG/DL (ref 6–20)
BUN/CREAT SERPL: 16 (ref 12–20)
CALCIUM SERPL-MCNC: 9.3 MG/DL (ref 8.5–10.1)
CHLORIDE SERPL-SCNC: 105 MMOL/L (ref 97–108)
CHOLEST SERPL-MCNC: 146 MG/DL
CO2 SERPL-SCNC: 25 MMOL/L (ref 21–32)
CREAT SERPL-MCNC: 1.06 MG/DL (ref 0.7–1.3)
CREAT UR-MCNC: 52.1 MG/DL
GLOBULIN SER CALC-MCNC: 2.8 G/DL (ref 2–4)
GLUCOSE SERPL-MCNC: 206 MG/DL (ref 65–100)
HDLC SERPL-MCNC: 48 MG/DL
HDLC SERPL: 3 (ref 0–5)
LDLC SERPL CALC-MCNC: 72 MG/DL (ref 0–100)
MICROALBUMIN UR-MCNC: 0.74 MG/DL
MICROALBUMIN/CREAT UR-RTO: 14 MG/G (ref 0–30)
POTASSIUM SERPL-SCNC: 4.1 MMOL/L (ref 3.5–5.1)
PROT SERPL-MCNC: 7 G/DL (ref 6.4–8.2)
PSA SERPL-MCNC: 0.7 NG/ML (ref 0.01–4)
SODIUM SERPL-SCNC: 138 MMOL/L (ref 136–145)
TRIGL SERPL-MCNC: 130 MG/DL
VLDLC SERPL CALC-MCNC: 26 MG/DL

## 2024-12-18 PROCEDURE — 90471 IMMUNIZATION ADMIN: CPT | Performed by: FAMILY MEDICINE

## 2024-12-18 PROCEDURE — 90661 CCIIV3 VAC ABX FR 0.5 ML IM: CPT | Performed by: FAMILY MEDICINE

## 2024-12-19 NOTE — RESULT ENCOUNTER NOTE
Jesus,  With the exception of your glucose levels the rest of your labs look good kidney function is stable, liver function is stable.    Prostate levels are normal.  Cholesterol levels are normal.  And your urine microalbumin levels are within the range that is acceptable.    I will see as advised please work on that diet and exercise and lets try to get some weight off

## 2024-12-29 DIAGNOSIS — E11.65 UNCONTROLLED TYPE 2 DIABETES MELLITUS WITH HYPERGLYCEMIA (HCC): ICD-10-CM

## 2024-12-30 RX ORDER — SEMAGLUTIDE 2.68 MG/ML
INJECTION, SOLUTION SUBCUTANEOUS
Qty: 4 ML | Refills: 0 | Status: SHIPPED | OUTPATIENT
Start: 2024-12-30

## 2024-12-30 NOTE — TELEPHONE ENCOUNTER
PCP: José Miguel Argueta MD    Last appt: 12/16/2024   Future Appointments   Date Time Provider Department Center   4/14/2025 10:15 AM José Miguel Argueta MD [facility] DEP       Requested Prescriptions     Pending Prescriptions Disp Refills    OZEMPIC, 2 MG/DOSE, 8 MG/3ML SOPN sc injection [Pharmacy Med Name: OZEMPIC 8 MG/3 ML (2 MG/DOSE)]  2     Sig: INJECT 2 MG INTO THE SKIN EVERY 7 DAYS         Prior labs and Blood pressures:  BP Readings from Last 3 Encounters:   12/16/24 112/72   08/20/24 117/78   04/22/24 131/81     Lab Results   Component Value Date/Time     12/16/2024 03:16 PM    K 4.1 12/16/2024 03:16 PM     12/16/2024 03:16 PM    CO2 25 12/16/2024 03:16 PM    BUN 17 12/16/2024 03:16 PM    GFRAA >60 06/20/2022 12:00 PM     Lab Results   Component Value Date/Time    OAZ7PPQF 8.7 12/16/2024 03:31 PM     Lab Results   Component Value Date/Time    CHOL 146 12/16/2024 03:16 PM    HDL 48 12/16/2024 03:16 PM    LDL 72 12/16/2024 03:16 PM    LDL 61.8 04/22/2024 03:49 PM    VLDL 26 12/16/2024 03:16 PM     No results found for: \"VITD3\"    No results found for: \"TSH\", \"TSH2\", \"TSH3\"

## 2025-01-24 DIAGNOSIS — E11.65 UNCONTROLLED TYPE 2 DIABETES MELLITUS WITH HYPERGLYCEMIA (HCC): ICD-10-CM

## 2025-01-24 NOTE — TELEPHONE ENCOUNTER
PCP: José Miguel Argueta MD    Last appt: 12/16/2024     Future Appointments   Date Time Provider Department Center   4/14/2025 10:15 AM José Miguel Argueta MD [facility] DEP       Requested Prescriptions     Pending Prescriptions Disp Refills    OZEMPIC, 2 MG/DOSE, 8 MG/3ML SOPN sc injection [Pharmacy Med Name: OZEMPIC 8 MG/3 ML (2 MG/DOSE)]       Sig: INJECT 2 MG INTO THE SKIN EVERY 7 DAYS         Prior labs and Blood pressures:  BP Readings from Last 3 Encounters:   12/16/24 112/72   08/20/24 117/78   04/22/24 131/81     Lab Results   Component Value Date/Time     12/16/2024 03:16 PM    K 4.1 12/16/2024 03:16 PM     12/16/2024 03:16 PM    CO2 25 12/16/2024 03:16 PM    BUN 17 12/16/2024 03:16 PM    GFRAA >60 06/20/2022 12:00 PM     Lab Results   Component Value Date/Time    [id number] 8.7 12/16/2024 03:31 PM     Lab Results   Component Value Date/Time    CHOL 146 12/16/2024 03:16 PM    HDL 48 12/16/2024 03:16 PM    LDL 72 12/16/2024 03:16 PM    LDL 61.8 04/22/2024 03:49 PM    VLDL 26 12/16/2024 03:16 PM

## 2025-01-28 RX ORDER — SEMAGLUTIDE 2.68 MG/ML
INJECTION, SOLUTION SUBCUTANEOUS
Qty: 3 ML | Refills: 2 | Status: SHIPPED | OUTPATIENT
Start: 2025-01-28

## 2025-02-25 DIAGNOSIS — E78.2 MIXED HYPERLIPIDEMIA: ICD-10-CM

## 2025-02-26 NOTE — TELEPHONE ENCOUNTER
PCP: José Miguel Argueta MD    Last appt: 12/16/2024   Future Appointments   Date Time Provider Department Center   4/14/2025 10:15 AM José Miguel Argueta MD [facility] DEP       Requested Prescriptions     Pending Prescriptions Disp Refills    metFORMIN (GLUCOPHAGE) 1000 MG tablet [Pharmacy Med Name: METFORMIN HCL 1,000 MG TABLET] 180 tablet 1     Sig: TAKE 1 TABLET BY MOUTH TWICE A DAY WITH FOOD    lisinopril (PRINIVIL;ZESTRIL) 20 MG tablet [Pharmacy Med Name: LISINOPRIL 20 MG TABLET] 90 tablet 1     Sig: TAKE 1 TABLET BY MOUTH EVERY DAY         Prior labs and Blood pressures:  BP Readings from Last 3 Encounters:   12/16/24 112/72   08/20/24 117/78   04/22/24 131/81     Lab Results   Component Value Date/Time     12/16/2024 03:16 PM    K 4.1 12/16/2024 03:16 PM     12/16/2024 03:16 PM    CO2 25 12/16/2024 03:16 PM    BUN 17 12/16/2024 03:16 PM    GFRAA >60 06/20/2022 12:00 PM     Lab Results   Component Value Date/Time    [id number] 8.7 12/16/2024 03:31 PM     Lab Results   Component Value Date/Time    CHOL 146 12/16/2024 03:16 PM    HDL 48 12/16/2024 03:16 PM    LDL 72 12/16/2024 03:16 PM    LDL 61.8 04/22/2024 03:49 PM    VLDL 26 12/16/2024 03:16 PM     No results found for: \"VITD3\"    No results found for: \"TSH\", \"TSH2\", \"TSH3\"

## 2025-02-27 RX ORDER — LISINOPRIL 20 MG/1
20 TABLET ORAL DAILY
Qty: 90 TABLET | Refills: 1 | Status: SHIPPED | OUTPATIENT
Start: 2025-02-27

## 2025-03-06 RX ORDER — EMPAGLIFLOZIN 25 MG/1
25 TABLET, FILM COATED ORAL DAILY
Qty: 90 TABLET | Refills: 0 | Status: SHIPPED | OUTPATIENT
Start: 2025-03-06

## 2025-03-06 NOTE — TELEPHONE ENCOUNTER
PCP: José Miguel Argueta MD    Last appt: 12/16/2024   Future Appointments   Date Time Provider Department Center   4/14/2025 10:15 AM José Miguel Argueta MD [facility] DEP       Requested Prescriptions     Pending Prescriptions Disp Refills    JARDIANCE 25 MG tablet [Pharmacy Med Name: JARDIANCE 25 MG TABLET] 90 tablet 1     Sig: TAKE 1 TABLET BY MOUTH EVERY DAY         Prior labs and Blood pressures:  BP Readings from Last 3 Encounters:   12/16/24 112/72   08/20/24 117/78   04/22/24 131/81     Lab Results   Component Value Date/Time     12/16/2024 03:16 PM    K 4.1 12/16/2024 03:16 PM     12/16/2024 03:16 PM    CO2 25 12/16/2024 03:16 PM    BUN 17 12/16/2024 03:16 PM    GFRAA >60 06/20/2022 12:00 PM     Lab Results   Component Value Date/Time    [id number] 8.7 12/16/2024 03:31 PM     Lab Results   Component Value Date/Time    CHOL 146 12/16/2024 03:16 PM    HDL 48 12/16/2024 03:16 PM    LDL 72 12/16/2024 03:16 PM    LDL 61.8 04/22/2024 03:49 PM    VLDL 26 12/16/2024 03:16 PM     No results found for: \"VITD3\"    No results found for: \"TSH\", \"TSH2\", \"TSH3\"

## 2025-03-26 DIAGNOSIS — E78.2 MIXED HYPERLIPIDEMIA: ICD-10-CM

## 2025-03-27 RX ORDER — ATORVASTATIN CALCIUM 40 MG/1
40 TABLET, FILM COATED ORAL DAILY
Qty: 90 TABLET | Refills: 1 | Status: SHIPPED | OUTPATIENT
Start: 2025-03-27

## 2025-03-27 NOTE — TELEPHONE ENCOUNTER
PCP: José Miguel Argueta MD    Last appt: 12/16/2024     Future Appointments   Date Time Provider Department Center   4/14/2025 10:15 AM José Miguel Argueta MD [facility] DEP       Requested Prescriptions     Pending Prescriptions Disp Refills    atorvastatin (LIPITOR) 40 MG tablet [Pharmacy Med Name: ATORVASTATIN 40 MG TABLET] 90 tablet 1     Sig: TAKE 1 TABLET BY MOUTH EVERY DAY       Prior labs and Blood pressures:  BP Readings from Last 3 Encounters:   12/16/24 112/72   08/20/24 117/78   04/22/24 131/81     Lab Results   Component Value Date/Time     12/16/2024 03:16 PM    K 4.1 12/16/2024 03:16 PM     12/16/2024 03:16 PM    CO2 25 12/16/2024 03:16 PM    BUN 17 12/16/2024 03:16 PM    GFRAA >60 06/20/2022 12:00 PM     Lab Results   Component Value Date/Time    [id number] 8.7 12/16/2024 03:31 PM     Lab Results   Component Value Date/Time    CHOL 146 12/16/2024 03:16 PM    HDL 48 12/16/2024 03:16 PM    LDL 72 12/16/2024 03:16 PM    LDL 61.8 04/22/2024 03:49 PM    VLDL 26 12/16/2024 03:16 PM     No results found for: \"VITD3\"    No results found for: \"TSH\", \"TSH2\", \"TSH3\"

## 2025-04-01 ENCOUNTER — TELEPHONE (OUTPATIENT)
Age: 60
End: 2025-04-01

## 2025-04-01 NOTE — TELEPHONE ENCOUNTER
Patient called stated he need approval on his semaglutide,  2 mg/dose (ozempic 2 mg/dose) 8 mg/3 ml sopn sc injection.    Requesting a call back    Best call back #[phone]

## 2025-04-11 SDOH — ECONOMIC STABILITY: TRANSPORTATION INSECURITY
IN THE PAST 12 MONTHS, HAS THE LACK OF TRANSPORTATION KEPT YOU FROM MEDICAL APPOINTMENTS OR FROM GETTING MEDICATIONS?: NO

## 2025-04-11 SDOH — ECONOMIC STABILITY: FOOD INSECURITY: WITHIN THE PAST 12 MONTHS, YOU WORRIED THAT YOUR FOOD WOULD RUN OUT BEFORE YOU GOT MONEY TO BUY MORE.: NEVER TRUE

## 2025-04-11 SDOH — ECONOMIC STABILITY: FOOD INSECURITY: WITHIN THE PAST 12 MONTHS, THE FOOD YOU BOUGHT JUST DIDN'T LAST AND YOU DIDN'T HAVE MONEY TO GET MORE.: NEVER TRUE

## 2025-04-11 SDOH — ECONOMIC STABILITY: TRANSPORTATION INSECURITY
IN THE PAST 12 MONTHS, HAS LACK OF TRANSPORTATION KEPT YOU FROM MEETINGS, WORK, OR FROM GETTING THINGS NEEDED FOR DAILY LIVING?: NO

## 2025-04-11 SDOH — ECONOMIC STABILITY: INCOME INSECURITY: IN THE LAST 12 MONTHS, WAS THERE A TIME WHEN YOU WERE NOT ABLE TO PAY THE MORTGAGE OR RENT ON TIME?: NO

## 2025-04-14 ENCOUNTER — OFFICE VISIT (OUTPATIENT)
Age: 60
End: 2025-04-14
Payer: COMMERCIAL

## 2025-04-14 VITALS
RESPIRATION RATE: 14 BRPM | DIASTOLIC BLOOD PRESSURE: 68 MMHG | WEIGHT: 242.2 LBS | TEMPERATURE: 96.8 F | HEART RATE: 66 BPM | SYSTOLIC BLOOD PRESSURE: 107 MMHG | OXYGEN SATURATION: 98 % | BODY MASS INDEX: 36.71 KG/M2 | HEIGHT: 68 IN

## 2025-04-14 DIAGNOSIS — E66.01 SEVERE OBESITY (BMI 35.0-39.9) WITH COMORBIDITY: ICD-10-CM

## 2025-04-14 DIAGNOSIS — E78.2 MIXED HYPERLIPIDEMIA: ICD-10-CM

## 2025-04-14 DIAGNOSIS — Z23 IMMUNIZATION DUE: ICD-10-CM

## 2025-04-14 DIAGNOSIS — I10 ESSENTIAL HYPERTENSION, BENIGN: ICD-10-CM

## 2025-04-14 DIAGNOSIS — E11.65 UNCONTROLLED TYPE 2 DIABETES MELLITUS WITH HYPERGLYCEMIA (HCC): Primary | ICD-10-CM

## 2025-04-14 DIAGNOSIS — E11.65 UNCONTROLLED TYPE 2 DIABETES MELLITUS WITH HYPERGLYCEMIA (HCC): ICD-10-CM

## 2025-04-14 LAB — HBA1C MFR BLD: 7.7 %

## 2025-04-14 PROCEDURE — 3078F DIAST BP <80 MM HG: CPT | Performed by: FAMILY MEDICINE

## 2025-04-14 PROCEDURE — 99214 OFFICE O/P EST MOD 30 MIN: CPT | Performed by: FAMILY MEDICINE

## 2025-04-14 PROCEDURE — 3074F SYST BP LT 130 MM HG: CPT | Performed by: FAMILY MEDICINE

## 2025-04-14 PROCEDURE — 3051F HG A1C>EQUAL 7.0%<8.0%: CPT | Performed by: FAMILY MEDICINE

## 2025-04-14 PROCEDURE — 83036 HEMOGLOBIN GLYCOSYLATED A1C: CPT | Performed by: FAMILY MEDICINE

## 2025-04-14 RX ORDER — INSULIN GLARGINE-YFGN 100 [IU]/ML
60 INJECTION, SOLUTION SUBCUTANEOUS DAILY
Qty: 5400 ML | Refills: 1 | Status: SHIPPED | OUTPATIENT
Start: 2025-04-14

## 2025-04-14 RX ORDER — SEMAGLUTIDE 2.68 MG/ML
2 INJECTION, SOLUTION SUBCUTANEOUS
Qty: 3 ML | Refills: 5 | Status: SHIPPED | OUTPATIENT
Start: 2025-04-14

## 2025-04-14 RX ORDER — SEMAGLUTIDE 2.68 MG/ML
2 INJECTION, SOLUTION SUBCUTANEOUS
Qty: 3 ML | Refills: 5 | Status: SHIPPED | OUTPATIENT
Start: 2025-04-14 | End: 2025-04-14 | Stop reason: SDUPTHER

## 2025-04-14 ASSESSMENT — PATIENT HEALTH QUESTIONNAIRE - PHQ9
SUM OF ALL RESPONSES TO PHQ QUESTIONS 1-9: 0
2. FEELING DOWN, DEPRESSED OR HOPELESS: NOT AT ALL
1. LITTLE INTEREST OR PLEASURE IN DOING THINGS: NOT AT ALL
SUM OF ALL RESPONSES TO PHQ QUESTIONS 1-9: 0

## 2025-04-14 ASSESSMENT — ENCOUNTER SYMPTOMS
ABDOMINAL PAIN: 0
SHORTNESS OF BREATH: 0

## 2025-04-14 NOTE — PROGRESS NOTES
Jesus Mackay 59 y.o. male  presents to the office today diabetes, HTN, Hyperlipidemia    Blood pressure 107/68, pulse 66, temperature 96.8 °F (36 °C), temperature source Temporal, resp. rate 14, height 1.727 m (5' 8\"), weight 109.9 kg (242 lb 3.2 oz), SpO2 98%. Body mass index is 36.83 kg/m².   Chief Complaint   Patient presents with    Diabetes     Follow up check for A1C        History of Present Illness  The patient presents for a follow-up appointment. The chief complaint is diabetes management. He reports that his A1c has improved from 8.7% to 7.7% since his last visit. He attributes this improvement to a reduction in alcohol consumption, noting that even one or two drinks now cause significant discomfort the following day.    A colonoscopy was performed on 01/31/2025, with results indicating no significant findings and a recommendation for a follow-up in five years. The patient plans to continue seeing the current clinician despite relocating to [place] and will travel back for appointments.    The patient received a flu vaccine during the last visit but has not received any additional COVID-19 vaccines since December 2023. He inquires about the pneumonia vaccine, mentioning a history of frequent colds progressing to pneumonia. The clinician recommends the pneumococcal 20 vaccine.    The patient has not had a recent eye exam and mentions an issue with insurance processing his Ozempic prescription, which requires prior authorization. He has a backup supply of the medication that will last another month and a half.    The patient plans to move to [place] by the end of May and will list his current home on the market next week. He expresses a preference for scheduling follow-up appointments every four months and agrees to complete lab work at a local [facility] in [place] before each visit.    SOCIAL HISTORY  The patient reports that he has been drinking alcohol, but it does not sit well

## 2025-04-14 NOTE — PROGRESS NOTES
Chief Complaint   Patient presents with    Diabetes     Follow up check for A1C         \"Have you been to the ER, urgent care clinic since your last visit?  Hospitalized since your last visit?\"    NO    “Have you seen or consulted any other health care providers outside of [facility] since your last visit?”    NO      “Have you had a colorectal cancer screening such as a colonoscopy/FIT/Cologuard?    Yes [facility] DR Claudine Rousseau 1/25    Date of last Colonoscopy: 10/18/2021  No cologuard on file  No FIT/FOBT on file   No flexible sigmoidoscopy on file         Click Here for Release of Records Request           4/14/2025    10:27 AM   PHQ-9    Little interest or pleasure in doing things 0   Feeling down, depressed, or hopeless 0   PHQ-2 Score 0   PHQ-9 Total Score 0           Financial Resource Strain: Low Risk  (12/16/2024)    Overall Financial Resource Strain (CARDIA)     Difficulty of Paying Living Expenses: Not hard at all      Food Insecurity: No Food Insecurity (4/11/2025)    Hunger Vital Sign     Worried About Running Out of Food in the Last Year: Never true     Ran Out of Food in the Last Year: Never true          Health Maintenance Due   Topic Date Due    Pneumococcal 50+ years Vaccine (2 of 2 - PCV) 12/05/2008    Respiratory Syncytial Virus (RSV) Pregnant or age 60 yrs+ (1 - Risk 60-74 years 1-dose series) Never done    Diabetic retinal exam  12/02/2023    COVID-19 Vaccine (6 - 2024-25 season) 09/01/2024    Colorectal Cancer Screen  10/18/2024

## 2025-04-27 DIAGNOSIS — K21.9 GASTRO-ESOPHAGEAL REFLUX DISEASE WITHOUT ESOPHAGITIS: ICD-10-CM

## 2025-04-28 RX ORDER — PANTOPRAZOLE SODIUM 40 MG/1
40 TABLET, DELAYED RELEASE ORAL DAILY
Qty: 90 TABLET | Refills: 1 | Status: SHIPPED | OUTPATIENT
Start: 2025-04-28

## 2025-04-28 NOTE — TELEPHONE ENCOUNTER
PCP: José Miguel Argueta MD    Last appt: 4/14/2025       Future Appointments   Date Time Provider Department Center   9/15/2025 10:00 AM José Miguel Argueta MD [facility] ECC DEP   11/3/2025 10:00 AM José Miguel Argueta MD [facility] DEP       Requested Prescriptions     Pending Prescriptions Disp Refills    pantoprazole (PROTONIX) 40 MG tablet [Pharmacy Med Name: PANTOPRAZOLE SOD DR 40 MG TAB] 90 tablet 1     Sig: TAKE 1 TABLET BY MOUTH EVERY DAY       Prior labs and Blood pressures:  BP Readings from Last 3 Encounters:   04/14/25 107/68   12/16/24 112/72   08/20/24 117/78     Lab Results   Component Value Date/Time     12/16/2024 03:16 PM    K 4.1 12/16/2024 03:16 PM     12/16/2024 03:16 PM    CO2 25 12/16/2024 03:16 PM    BUN 17 12/16/2024 03:16 PM    GFRAA >60 06/20/2022 12:00 PM     Lab Results   Component Value Date/Time    [id number] 7.7 04/14/2025 11:40 AM     Lab Results   Component Value Date/Time    CHOL 146 12/16/2024 03:16 PM    HDL 48 12/16/2024 03:16 PM    LDL 72 12/16/2024 03:16 PM    LDL 61.8 04/22/2024 03:49 PM    VLDL 26 12/16/2024 03:16 PM     No results found for: \"VITD3\"    No results found for: \"TSH\", \"TSH2\", \"TSH3\"

## 2025-05-15 DIAGNOSIS — E11.65 UNCONTROLLED TYPE 2 DIABETES MELLITUS WITH HYPERGLYCEMIA (HCC): ICD-10-CM

## 2025-05-15 RX ORDER — SEMAGLUTIDE 2.68 MG/ML
2 INJECTION, SOLUTION SUBCUTANEOUS
Refills: 2 | OUTPATIENT
Start: 2025-05-15

## 2025-05-28 DIAGNOSIS — E11.65 TYPE 2 DIABETES MELLITUS WITH HYPERGLYCEMIA (HCC): ICD-10-CM

## 2025-05-30 RX ORDER — PIOGLITAZONE 30 MG/1
30 TABLET ORAL DAILY
Qty: 30 TABLET | Refills: 0 | Status: SHIPPED | OUTPATIENT
Start: 2025-05-30

## 2025-05-30 NOTE — TELEPHONE ENCOUNTER
PCP: José Miguel Argueta MD    Last appt: 4/14/2025       Future Appointments   Date Time Provider Department Center   9/15/2025 10:00 AM José Miguel Argueta MD [facility] ECC DEP   11/3/2025 10:00 AM José Miguel Argueta MD [facility] DEP       Requested Prescriptions     Pending Prescriptions Disp Refills    pioglitazone (ACTOS) 30 MG tablet [Pharmacy Med Name: PIOGLITAZONE HCL 30 MG TABLET] 90 tablet 1     Sig: TAKE 1 TABLET BY MOUTH EVERY DAY       Prior labs and Blood pressures:  BP Readings from Last 3 Encounters:   04/14/25 107/68   12/16/24 112/72   08/20/24 117/78     Lab Results   Component Value Date/Time     12/16/2024 03:16 PM    K 4.1 12/16/2024 03:16 PM     12/16/2024 03:16 PM    CO2 25 12/16/2024 03:16 PM    BUN 17 12/16/2024 03:16 PM    GFRAA >60 06/20/2022 12:00 PM     Lab Results   Component Value Date/Time    [id number] 7.7 04/14/2025 11:40 AM     Lab Results   Component Value Date/Time    CHOL 146 12/16/2024 03:16 PM    HDL 48 12/16/2024 03:16 PM    LDL 72 12/16/2024 03:16 PM    LDL 61.8 04/22/2024 03:49 PM    VLDL 26 12/16/2024 03:16 PM     No results found for: \"VITD3\"    No results found for: \"TSH\", \"TSH2\", \"TSH3\"

## 2025-06-11 NOTE — TELEPHONE ENCOUNTER
Last appointment: 04/14/2025 MD Argueta   Next appointment: 09/15/2025 & 11/03/2025 MD Argueta   Previous refill encounter(s):   03/06/2025 Jardiance #[id number]     For Pharmacy Admin Tracking Only    Program: Medication Refill  Intervention Detail: New Rx: 1, reason: Patient Preference  Time Spent (min): 5    Requested Prescriptions     Pending Prescriptions Disp Refills    empagliflozin (JARDIANCE) 25 MG tablet 90 tablet 0     Sig: Take 1 tablet by mouth daily

## 2025-06-27 DIAGNOSIS — E11.65 TYPE 2 DIABETES MELLITUS WITH HYPERGLYCEMIA (HCC): ICD-10-CM

## 2025-06-28 RX ORDER — PIOGLITAZONE 30 MG/1
30 TABLET ORAL DAILY
Qty: 30 TABLET | Refills: 0 | Status: SHIPPED | OUTPATIENT
Start: 2025-06-28

## 2025-07-17 DIAGNOSIS — E11.65 UNCONTROLLED TYPE 2 DIABETES MELLITUS WITH HYPERGLYCEMIA (HCC): ICD-10-CM

## 2025-07-17 RX ORDER — SEMAGLUTIDE 2.68 MG/ML
2 INJECTION, SOLUTION SUBCUTANEOUS
Qty: 3 ML | Refills: 3 | Status: SHIPPED | OUTPATIENT
Start: 2025-07-17

## 2025-07-17 NOTE — TELEPHONE ENCOUNTER
PCP: José Miguel Argueta MD    Last appt: 4/14/2025     Future Appointments   Date Time Provider Department Center   9/15/2025 10:00 AM José Miguel Argueta MD [facility] ECC DEP   11/3/2025 10:00 AM José Miguel Argueta MD [facility] DEP       Requested Prescriptions     Pending Prescriptions Disp Refills    OZEMPIC, 2 MG/DOSE, 8 MG/3ML SOPN sc injection [Pharmacy Med Name: OZEMPIC 8 MG/3 ML (2 MG/DOSE)]  1     Sig: INJECT 2 MG INTO THE SKIN EVERY 7 DAYS       Prior labs and Blood pressures:  BP Readings from Last 3 Encounters:   04/14/25 107/68   12/16/24 112/72   08/20/24 117/78     Lab Results   Component Value Date/Time     12/16/2024 03:16 PM    K 4.1 12/16/2024 03:16 PM     12/16/2024 03:16 PM    CO2 25 12/16/2024 03:16 PM    BUN 17 12/16/2024 03:16 PM    GFRAA >60 06/20/2022 12:00 PM     Lab Results   Component Value Date/Time    [id number] 7.7 04/14/2025 11:40 AM     Lab Results   Component Value Date/Time    CHOL 146 12/16/2024 03:16 PM    HDL 48 12/16/2024 03:16 PM    LDL 72 12/16/2024 03:16 PM    LDL 61.8 04/22/2024 03:49 PM    VLDL 26 12/16/2024 03:16 PM     No results found for: \"VITD3\"    No results found for: \"TSH\", \"TSH2\", \"TSH3\"

## 2025-07-29 DIAGNOSIS — E11.65 TYPE 2 DIABETES MELLITUS WITH HYPERGLYCEMIA (HCC): ICD-10-CM

## 2025-07-29 RX ORDER — PIOGLITAZONE 30 MG/1
30 TABLET ORAL DAILY
Qty: 90 TABLET | Refills: 1 | Status: SHIPPED | OUTPATIENT
Start: 2025-07-29

## 2025-07-29 NOTE — TELEPHONE ENCOUNTER
PCP: José Miguel Argueta MD    Last appt: 4/14/2025   Future Appointments   Date Time Provider Department Center   9/15/2025 10:00 AM José Miguel Argueta MD [facility] ECC DEP   11/3/2025 10:00 AM José Miguel Argueta MD [facility] DEP       Requested Prescriptions     Pending Prescriptions Disp Refills    pioglitazone (ACTOS) 30 MG tablet [Pharmacy Med Name: PIOGLITAZONE HCL 30 MG TABLET] 90 tablet 1     Sig: TAKE 1 TABLET BY MOUTH EVERY DAY         Prior labs and Blood pressures:  BP Readings from Last 3 Encounters:   04/14/25 107/68   12/16/24 112/72   08/20/24 117/78     Lab Results   Component Value Date/Time     12/16/2024 03:16 PM    K 4.1 12/16/2024 03:16 PM     12/16/2024 03:16 PM    CO2 25 12/16/2024 03:16 PM    BUN 17 12/16/2024 03:16 PM    GFRAA >60 06/20/2022 12:00 PM     Lab Results   Component Value Date/Time    [id number] 7.7 04/14/2025 11:40 AM     Lab Results   Component Value Date/Time    CHOL 146 12/16/2024 03:16 PM    HDL 48 12/16/2024 03:16 PM    LDL 72 12/16/2024 03:16 PM    LDL 61.8 04/22/2024 03:49 PM    VLDL 26 12/16/2024 03:16 PM     No results found for: \"VITD3\"    No results found for: \"TSH\", \"TSH2\", \"TSH3\"

## 2025-08-23 DIAGNOSIS — E78.2 MIXED HYPERLIPIDEMIA: ICD-10-CM

## 2025-08-25 RX ORDER — LISINOPRIL 20 MG/1
20 TABLET ORAL DAILY
Qty: 90 TABLET | Refills: 1 | Status: SHIPPED | OUTPATIENT
Start: 2025-08-25